# Patient Record
Sex: FEMALE | Race: WHITE | URBAN - METROPOLITAN AREA
[De-identification: names, ages, dates, MRNs, and addresses within clinical notes are randomized per-mention and may not be internally consistent; named-entity substitution may affect disease eponyms.]

---

## 2017-01-10 ENCOUNTER — CARE COORDINATION (OUTPATIENT)
Dept: ONCOLOGY | Facility: CLINIC | Age: 47
End: 2017-01-10

## 2017-01-10 DIAGNOSIS — C20 RECTAL CANCER (H): Primary | ICD-10-CM

## 2017-01-10 NOTE — PROGRESS NOTES
Called Fabienne to review conversation below.  She is going to call her insurance company to discuss and will return call to let us know what she would like to do.    Becky Interiano PA-C Thomas, Joyce; Becky Cordova, RN; Fly Dan                   Thanks Ayana. Becky, please call patient and see what she wants to do. We could potentially switch her to 5-FU with Avastin or she could pay until she reaches her out of pocket max and stay on the pills. 5-FU would be a take home pump and her infusions would be every 2 weeks. They should be equally efficacious, just the pump is less convenient and more frequent infusion times.   Becky            Previous Messages       ----- Message -----      From: Ayana Garcia      Sent: 1/9/2017  11:30 AM        To: Becky Interiano PA-C, Xochitl Christine MD, *   Subject: Capecitabine therapy                             Hello,     I wanted to give you all a update on the Capecitabine therapy for Fabienne. Her medication went thru insurance with no prior authorization but the patient copayment is $2,231.91. I looked into finding copay assistance for her but there are no grants available under her diagnosis. Unfortunately, once capecitabine as a generic became available, there are no free drug programs either. I have not contacted the patient, this is actually a Fly patient because she is on infusion therapy, but I wanted to make you all aware in case you want to offer another option.     Thank you,   Ayana

## 2017-01-11 NOTE — PROGRESS NOTES
Received update from Fabienne- she will have funds in an HSA to cover medication this month.  She is going to call [facility] to provide them with her card.  There is the potential delay in starting her pills by a few days.  She will call and let us know when she has them and has started taking them.

## 2017-01-13 RX ORDER — CAPECITABINE 500 MG/1
1000 TABLET, FILM COATED ORAL 2 TIMES DAILY
Qty: 84 TABLET | Refills: 0 | Status: SHIPPED | OUTPATIENT
Start: 2017-01-13 | End: 2017-03-15

## 2017-01-13 NOTE — PROGRESS NOTES
[facility] pharmacy liasion Fly Ni is researching one final possibility of a inge for Fabienne.  She is due to start Xeloda on 1/18/17, but discussed that a day or two delay is acceptable to wait for inge.  Change in pharmacy due to insurance- if same cost, she prefers to get [place] [facility] [place] [facility].

## 2017-01-20 ENCOUNTER — APPOINTMENT (OUTPATIENT)
Dept: LAB | Facility: CLINIC | Age: 47
End: 2017-01-20
Attending: INTERNAL MEDICINE
Payer: COMMERCIAL

## 2017-01-20 ENCOUNTER — INFUSION THERAPY VISIT (OUTPATIENT)
Dept: ONCOLOGY | Facility: CLINIC | Age: 47
End: 2017-01-20
Attending: INTERNAL MEDICINE
Payer: COMMERCIAL

## 2017-01-20 VITALS
SYSTOLIC BLOOD PRESSURE: 112 MMHG | OXYGEN SATURATION: 98 % | HEART RATE: 66 BPM | WEIGHT: 136.4 LBS | BODY MASS INDEX: 23.78 KG/M2 | TEMPERATURE: 95.6 F | DIASTOLIC BLOOD PRESSURE: 77 MMHG | RESPIRATION RATE: 18 BRPM

## 2017-01-20 DIAGNOSIS — C20 RECTAL CANCER (H): Primary | ICD-10-CM

## 2017-01-20 LAB
ALBUMIN SERPL-MCNC: 4 G/DL (ref 3.4–5)
ALBUMIN UR-MCNC: NEGATIVE MG/DL
ALP SERPL-CCNC: 92 U/L (ref 40–150)
ALT SERPL W P-5'-P-CCNC: 42 U/L (ref 0–50)
ANION GAP SERPL CALCULATED.3IONS-SCNC: 8 MMOL/L (ref 3–14)
AST SERPL W P-5'-P-CCNC: 37 U/L (ref 0–45)
BASOPHILS # BLD AUTO: 0 10E9/L (ref 0–0.2)
BASOPHILS NFR BLD AUTO: 0.5 %
BILIRUB SERPL-MCNC: 2.5 MG/DL (ref 0.2–1.3)
BUN SERPL-MCNC: 19 MG/DL (ref 7–30)
CALCIUM SERPL-MCNC: 9.1 MG/DL (ref 8.5–10.1)
CHLORIDE SERPL-SCNC: 108 MMOL/L (ref 94–109)
CO2 SERPL-SCNC: 26 MMOL/L (ref 20–32)
CREAT SERPL-MCNC: 0.56 MG/DL (ref 0.52–1.04)
DIFFERENTIAL METHOD BLD: ABNORMAL
EOSINOPHIL # BLD AUTO: 0.1 10E9/L (ref 0–0.7)
EOSINOPHIL NFR BLD AUTO: 1.9 %
ERYTHROCYTE [DISTWIDTH] IN BLOOD BY AUTOMATED COUNT: 15.1 % (ref 10–15)
GFR SERPL CREATININE-BSD FRML MDRD: ABNORMAL ML/MIN/1.7M2
GLUCOSE SERPL-MCNC: 90 MG/DL (ref 70–99)
HCT VFR BLD AUTO: 37.6 % (ref 35–47)
HGB BLD-MCNC: 13.4 G/DL (ref 11.7–15.7)
IMM GRANULOCYTES # BLD: 0 10E9/L (ref 0–0.4)
IMM GRANULOCYTES NFR BLD: 0.2 %
LYMPHOCYTES # BLD AUTO: 1.1 10E9/L (ref 0.8–5.3)
LYMPHOCYTES NFR BLD AUTO: 25.6 %
MCH RBC QN AUTO: 35.2 PG (ref 26.5–33)
MCHC RBC AUTO-ENTMCNC: 35.6 G/DL (ref 31.5–36.5)
MCV RBC AUTO: 99 FL (ref 78–100)
MONOCYTES # BLD AUTO: 0.3 10E9/L (ref 0–1.3)
MONOCYTES NFR BLD AUTO: 8 %
NEUTROPHILS # BLD AUTO: 2.7 10E9/L (ref 1.6–8.3)
NEUTROPHILS NFR BLD AUTO: 63.8 %
NRBC # BLD AUTO: 0 10*3/UL
NRBC BLD AUTO-RTO: 0 /100
PLATELET # BLD AUTO: 90 10E9/L (ref 150–450)
POTASSIUM SERPL-SCNC: 4.1 MMOL/L (ref 3.4–5.3)
PROT SERPL-MCNC: 7.3 G/DL (ref 6.8–8.8)
RBC # BLD AUTO: 3.81 10E12/L (ref 3.8–5.2)
SODIUM SERPL-SCNC: 142 MMOL/L (ref 133–144)
WBC # BLD AUTO: 4.3 10E9/L (ref 4–11)

## 2017-01-20 PROCEDURE — 80053 COMPREHEN METABOLIC PANEL: CPT | Performed by: PHYSICIAN ASSISTANT

## 2017-01-20 PROCEDURE — 85025 COMPLETE CBC W/AUTO DIFF WBC: CPT | Performed by: PHYSICIAN ASSISTANT

## 2017-01-20 PROCEDURE — 25000125 ZZHC RX 250: Mod: ZF | Performed by: PHYSICIAN ASSISTANT

## 2017-01-20 PROCEDURE — 96413 CHEMO IV INFUSION 1 HR: CPT

## 2017-01-20 PROCEDURE — 25000125 ZZHC RX 250: Mod: ZF | Performed by: INTERNAL MEDICINE

## 2017-01-20 PROCEDURE — 25000128 H RX IP 250 OP 636: Mod: JW,ZF | Performed by: PHYSICIAN ASSISTANT

## 2017-01-20 PROCEDURE — 81003 URINALYSIS AUTO W/O SCOPE: CPT | Performed by: INTERNAL MEDICINE

## 2017-01-20 RX ORDER — HEPARIN SODIUM (PORCINE) LOCK FLUSH IV SOLN 100 UNIT/ML 100 UNIT/ML
5 SOLUTION INTRAVENOUS EVERY 8 HOURS
Status: DISCONTINUED | OUTPATIENT
Start: 2017-01-20 | End: 2017-01-20 | Stop reason: HOSPADM

## 2017-01-20 RX ADMIN — BEVACIZUMAB 465 MG: 400 INJECTION, SOLUTION INTRAVENOUS at 08:53

## 2017-01-20 RX ADMIN — SODIUM CHLORIDE, PRESERVATIVE FREE 5 ML: 5 INJECTION INTRAVENOUS at 08:09

## 2017-01-20 RX ADMIN — SODIUM CHLORIDE, PRESERVATIVE FREE 5 ML: 5 INJECTION INTRAVENOUS at 09:22

## 2017-01-20 RX ADMIN — SODIUM CHLORIDE 250 ML: 9 INJECTION, SOLUTION INTRAVENOUS at 08:53

## 2017-01-20 ASSESSMENT — PAIN SCALES - GENERAL: PAINLEVEL: NO PAIN (0)

## 2017-01-20 NOTE — PROGRESS NOTES
A inge was found for Fabienne and the pharmacy liaison reports that she took home her Xeloda with $0 copay today.

## 2017-01-20 NOTE — MR AVS SNAPSHOT
After Visit Summary   1/20/2017    Fabienne Lynch    MRN: [id number]           Patient Information     Date Of Birth          1970        Visit Information        Provider Department      1/20/2017 8:00 AM  16 ATC;  ONCOLOGY INFUSION [facility]        Today's Diagnoses     Rectal cancer (H)    -  1       Care Instructions    Contact Numbers  [facility]: [phone]    After Hours:  [phone]  Triage: [phone]    Please call the [facility] Triage line if you experience a temperature greater than or equal to 100.5, shaking chills, have uncontrolled nausea, vomiting and/or diarrhea, dizziness, shortness of breath, chest pain, bleeding, unexplained bruising, or if you have any other new/concerning symptoms, questions or concerns.     If it is after hours, weekends, or holidays, please call the main hospital  at  [phone] and ask to speak to the Oncology doctor on call.     If you are having any concerning symptoms or wish to speak to a provider before your next infusion visit, please call your care coordinator or triage to notify them so we can adequately serve you.     If you need a refill on a narcotic prescription or other medication, please call triage before your infusion appointment.         January 2017 Sunday Monday Tuesday Wednesday Thursday Friday Saturday   1     2     3     4     5     6     7       8     9     10     11     12     13     14       15     16     17     18     19     20     [facility] LAB DRAW    7:30 AM   (15 min.)    [facility] LAB DRAW   [facility] Lab Draw     [facility] ONC INFUSION 60    8:00 AM   (60 min.)    ONCOLOGY INFUSION   [facility] 21       22     23     24     25     26     27     28       29     30     31                                    February 2017 Sunday Monday Tuesday Wednesday Thursday Friday Saturday                  1     2     3     4       5     6     7     8     9      10     [facility] LAB DRAW    7:15 AM   (15 min.)    [facility] LAB DRAW   [facility] Lab Draw     CT CHEST/ABDOMEN/PELVIS W    7:40 AM   (20 min.)   UCCT2   [facility] CT 11       12     13     [facility] LAB DRAW    7:15 AM   (15 min.)   [facility]   [facility] Lab Draw     [facility] RETURN    7:45 AM   (30 min.)   Xochitl Christine MD   [facility] ONC INFUSION 60    8:30 AM   (60 min.)    ONCOLOGY INFUSION   [facility] 14     15     16     17     18       19     20     21     22     23     24     25       26     27     28                                     Recent Results (from the past 24 hour(s))   Comprehensive metabolic panel    Collection Time: 01/20/17  8:15 AM   Result Value Ref Range    Sodium 142 133 - 144 mmol/L    Potassium 4.1 3.4 - 5.3 mmol/L    Chloride 108 94 - 109 mmol/L    Carbon Dioxide 26 20 - 32 mmol/L    Anion Gap 8 3 - 14 mmol/L    Glucose 90 70 - 99 mg/dL    Urea Nitrogen 19 7 - 30 mg/dL    Creatinine 0.56 0.52 - 1.04 mg/dL    GFR Estimate >90  Non  GFR Calc   >60 mL/min/1.7m2    GFR Estimate If Black >90   GFR Calc   >60 mL/min/1.7m2    Calcium 9.1 8.5 - 10.1 mg/dL    Bilirubin Total 2.5 (H) 0.2 - 1.3 mg/dL    Albumin 4.0 3.4 - 5.0 g/dL    Protein Total 7.3 6.8 - 8.8 g/dL    Alkaline Phosphatase 92 40 - 150 U/L    ALT 42 0 - 50 U/L    AST 37 0 - 45 U/L   CBC with platelets differential    Collection Time: 01/20/17  8:15 AM   Result Value Ref Range    WBC 4.3 4.0 - 11.0 10e9/L    RBC Count 3.81 3.8 - 5.2 10e12/L    Hemoglobin 13.4 11.7 - 15.7 g/dL    Hematocrit 37.6 35.0 - 47.0 %    MCV 99 78 - 100 fl    MCH 35.2 (H) 26.5 - 33.0 pg    MCHC 35.6 31.5 - 36.5 g/dL    RDW 15.1 (H) 10.0 - 15.0 %    Platelet Count 90 (L) 150 - 450 10e9/L    Diff Method Automated Method     % Neutrophils 63.8 %    % Lymphocytes 25.6 %    % Monocytes 8.0 %    % Eosinophils 1.9 %    % Basophils 0.5 %    %  Immature Granulocytes 0.2 %    Nucleated RBCs 0 0 /100    Absolute Neutrophil 2.7 1.6 - 8.3 10e9/L    Absolute Lymphocytes 1.1 0.8 - 5.3 10e9/L    Absolute Monocytes 0.3 0.0 - 1.3 10e9/L    Absolute Eosinophils 0.1 0.0 - 0.7 10e9/L    Absolute Basophils 0.0 0.0 - 0.2 10e9/L    Abs Immature Granulocytes 0.0 0 - 0.4 10e9/L    Absolute Nucleated RBC 0.0    Protein qualitative urine    Collection Time: 01/20/17  8:22 AM   Result Value Ref Range    Protein Albumin Urine Negative NEG mg/dL                 Follow-ups after your visit        Your next 10 appointments already scheduled     Feb 10, 2017  7:15 AM   [facility] Lab Draw with  M[facility]ONIC LAB DRAW   [facility] Lab Draw ([facility])    [address] [place] [id number] [phone]            Feb 10, 2017  7:40 AM   (Arrive by 7:25 AM)   CT CHEST/ABDOMEN/PELVIS W CONTRAST with UCCT2   [facility] [place] [facility] ([facility])    [address] [place] [id number] [phone]           Please bring any scans or X-rays taken at other hospitals, if similar tests were done. Also bring a list of your medicines, including vitamins, minerals and over-the-counter drugs. It is safest to leave personal items at home.  Be sure to tell your doctor:   If you have any allergies.   If there s any chance you are pregnant.   If you are breastfeeding.   If you have any special needs.  You may have contrast for this exam. To prepare:   Do not eat or drink for 2 hours before your exam. If you need to take medicine, you may take it with small sips of water. (We may ask you to take liquid medicine as well.)   The day before your exam, drink extra fluids at least six 8-ounce glasses (unless your doctor tells you to restrict your fluids).  Patients over 70 or patients with diabetes or kidney problems:   If you haven t had a blood test (creatinine test) within the last 30 days, go to  your clinic or Diagnostic Imaging Department for this test.  If you have diabetes:   If your kidney function is normal, continue taking your metformin (Avandamet, Glucophage, Glucovance, Metaglip) on the day of your exam.   If your kidney function is abnormal, wait 48 hours before restarting this medicine.  You will have oral contrast for this exam:   You will drink the contrast at home. Get this from your clinic or Diagnostic Imaging Department. Please follow the directions given.  Please wear loose clothing, such as a sweat suit or jogging clothes. Avoid snaps, zippers and other metal. We may ask you to undress and put on a hospital gown.  If you have any questions, please call the Imaging Department where you will have your exam.            Feb 13, 2017  7:15 AM   [facility] Lab Draw with  M[facility] LAB DRAW   [facility] Lab Draw ([facility])    [address] [place] [id number] [phone]            Feb 13, 2017  7:45 AM   (Arrive by 7:30 AM)   Return Visit with Xochitl Christine MD   [facility] ([facility])    [address] [place] [id number] [phone]            Feb 13, 2017  8:30 AM   Infusion 60 with [facility] ONCOLOGY INFUSION, UC 16 ATC   [facility] ([facility])    [address] [place] [id number] [phone]              Who to contact     If you have questions or need follow up information about today's clinic visit or your schedule please contact [facility] directly at [phone].  Normal or non-critical lab and imaging results will be communicated to you by MyChart, letter or phone within 4 business days after the clinic has received the results. If you do not hear from us within 7 days, please contact the clinic through MyChart or phone. If you have a critical or abnormal lab result, we  will notify you by phone as soon as possible.  Submit refill requests through [employer] or call your pharmacy and they will forward the refill request to us. Please allow 3 business days for your refill to be completed.          Additional Information About Your Visit        [employer] Information     [employer] gives you secure access to your electronic health record. If you see a primary care provider, you can also send messages to your care team and make appointments. If you have questions, please call your primary care clinic.  If you do not have a primary care provider, please call [phone] and they will assist you.        Care EveryWhere ID     This is your Care EveryWhere ID. This could be used by other organizations to access your [place] medical records  [id number]        Your Vitals Were     Pulse Temperature Respirations Pulse Oximetry          66 95.6  F (35.3  C) 18 98%         Blood Pressure from Last 3 Encounters:   01/20/17 112/77   12/30/16 117/78   12/09/16 104/76    Weight from Last 3 Encounters:   01/20/17 61.871 kg (136 lb 6.4 oz)   12/30/16 62.097 kg (136 lb 14.4 oz)   12/09/16 62.279 kg (137 lb 4.8 oz)              We Performed the Following     CBC with platelets differential     Comprehensive metabolic panel     MD Instruction for Protocol     Nursing Communication 1     Protein qualitative urine        Primary Care Provider Office Phone # Fax #    Golden Quinteros [phone]       [place] [address] [facility] [address]        Thank you!     Thank you for choosing [facility]  for your care. Our goal is always to provide you with excellent care. Hearing back from our patients is one way we can continue to improve our services. Please take a few minutes to complete the written survey that you may receive in the mail after your visit with us. Thank you!             Your Updated Medication List - Protect others around you: Learn how to safely use,  store and throw away your medicines at www.disposemymeds.org.          This list is accurate as of: 1/20/17  9:02 AM.  Always use your most recent med list.                   Brand Name Dispense Instructions for use    * capecitabine 500 MG tablet CHEMO    XELODA     Take 3 tablets (1,500 mg) by mouth 2 times daily Take by mouth twice daily for 7 days on, then 7 days off.       * capecitabine 500 MG tablet CHEMO    XELODA    84 tablet    Take 3 tablets (1,500 mg) by mouth 2 times daily for 7 days on, then 7 days off       ondansetron 8 MG tablet    ZOFRAN    30 tablet    Take 1 tablet (8 mg) by mouth every 8 hours as needed for nausea       TYLENOL PO      Take 500 mg by mouth every 6 hours as needed       * Notice:  This list has 2 medication(s) that are the same as other medications prescribed for you. Read the directions carefully, and ask your doctor or other care provider to review them with you.

## 2017-01-20 NOTE — PATIENT INSTRUCTIONS
Contact Numbers  [facility]: [phone]    After Hours:  [phone]  Triage: [phone]    Please call the [facility] Triage line if you experience a temperature greater than or equal to 100.5, shaking chills, have uncontrolled nausea, vomiting and/or diarrhea, dizziness, shortness of breath, chest pain, bleeding, unexplained bruising, or if you have any other new/concerning symptoms, questions or concerns.     If it is after hours, weekends, or holidays, please call the main hospital  at  [phone] and ask to speak to the Oncology doctor on call.     If you are having any concerning symptoms or wish to speak to a provider before your next infusion visit, please call your care coordinator or triage to notify them so we can adequately serve you.     If you need a refill on a narcotic prescription or other medication, please call triage before your infusion appointment.         January 2017 Sunday Monday Tuesday Wednesday Thursday Friday Saturday   1     2     3     4     5     6     7       8     9     10     11     12     13     14       15     16     17     18     19     20     [facility] D[facility]    7:30 AM   (15 min.)    [facility] LAB DRAW   [facility] Lab Draw     [facility] ONC INFUSION 60    8:00 AM   (60 min.)    ONCOLOGY INFUSION   [facility] 21       22     23     24     25     26     27     28       29     30     31 February 2017 Sunday Monday Tuesday Wednesday Thursday Friday Saturday                  1     2     3     4       5     6     7     8     9     10     [facility] D[facility]    7:15 AM   (15 min.)    [facility] DRAW   [facility]     CT CHEST/ABDOMEN/PELVIS W    7:40 AM   (20 min.)   UCCT2   [facility] CT 11       12     13     [facility] DRAW    7:15 AM   (15 min.)    [facility] LAB DRAW   [facility]     [facility] RETURN    7:45 AM   (30 min.)   Xochitl Christine MD   M  [facility]     [facility] ONC INFUSION 60    8:30 AM   (60 min.)    ONCOLOGY INFUSION   [facility] 14     15     16     17     18       19     20     21     22     23     24     25       26     27     28                                     Recent Results (from the past 24 hour(s))   Comprehensive metabolic panel    Collection Time: 01/20/17  8:15 AM   Result Value Ref Range    Sodium 142 133 - 144 mmol/L    Potassium 4.1 3.4 - 5.3 mmol/L    Chloride 108 94 - 109 mmol/L    Carbon Dioxide 26 20 - 32 mmol/L    Anion Gap 8 3 - 14 mmol/L    Glucose 90 70 - 99 mg/dL    Urea Nitrogen 19 7 - 30 mg/dL    Creatinine 0.56 0.52 - 1.04 mg/dL    GFR Estimate >90  Non  GFR Calc   >60 mL/min/1.7m2    GFR Estimate If Black >90   GFR Calc   >60 mL/min/1.7m2    Calcium 9.1 8.5 - 10.1 mg/dL    Bilirubin Total 2.5 (H) 0.2 - 1.3 mg/dL    Albumin 4.0 3.4 - 5.0 g/dL    Protein Total 7.3 6.8 - 8.8 g/dL    Alkaline Phosphatase 92 40 - 150 U/L    ALT 42 0 - 50 U/L    AST 37 0 - 45 U/L   CBC with platelets differential    Collection Time: 01/20/17  8:15 AM   Result Value Ref Range    WBC 4.3 4.0 - 11.0 10e9/L    RBC Count 3.81 3.8 - 5.2 10e12/L    Hemoglobin 13.4 11.7 - 15.7 g/dL    Hematocrit 37.6 35.0 - 47.0 %    MCV 99 78 - 100 fl    MCH 35.2 (H) 26.5 - 33.0 pg    MCHC 35.6 31.5 - 36.5 g/dL    RDW 15.1 (H) 10.0 - 15.0 %    Platelet Count 90 (L) 150 - 450 10e9/L    Diff Method Automated Method     % Neutrophils 63.8 %    % Lymphocytes 25.6 %    % Monocytes 8.0 %    % Eosinophils 1.9 %    % Basophils 0.5 %    % Immature Granulocytes 0.2 %    Nucleated RBCs 0 0 /100    Absolute Neutrophil 2.7 1.6 - 8.3 10e9/L    Absolute Lymphocytes 1.1 0.8 - 5.3 10e9/L    Absolute Monocytes 0.3 0.0 - 1.3 10e9/L    Absolute Eosinophils 0.1 0.0 - 0.7 10e9/L    Absolute Basophils 0.0 0.0 - 0.2 10e9/L    Abs Immature Granulocytes 0.0 0 - 0.4 10e9/L    Absolute Nucleated RBC 0.0    Protein  qualitative urine    Collection Time: 01/20/17  8:22 AM   Result Value Ref Range    Protein Albumin Urine Negative NEG mg/dL

## 2017-01-20 NOTE — NURSING NOTE
Chief Complaint   Patient presents with     Port Draw     port accessed with power needel for labs and infusion, vitals checked

## 2017-01-20 NOTE — PROGRESS NOTES
Infusion Nursing Note:  Fabienne Lynch presents today for C7D1 Avastin.    Patient seen by provider today: No    Treatment Conditions:  HGB     13.4   1/20/2017  WBC      4.3   1/20/2017   ANEU      2.7   1/20/2017  PLT       90   1/20/2017       NA      142   1/20/2017                POTASSIUM      4.1   1/20/2017           CR     0.56   1/20/2017                ANDRZEJ      9.1   1/20/2017             BILITOTAL      2.5   1/20/2017        ALBUMIN      4.0   1/20/2017                 ALT       42   1/20/2017        AST       37   1/20/2017  BP:112/77  Urine: negative  Results reviewed, labs MET treatment parameters, ok to proceed with treatment.    Intravenous Access:  Implanted Port.  Access dc'd at time of discharge.      Note:   Results reviewed, copy given to patient.  Proceed with treatment.    Copy of AVS given to patient. + Blood return from PORT pre and post infusion.  Tolerated infusion without incident. Xeloda Prescription filled today.   D/C in care of spouse.  Pt will return 2/10 for CT and 2/13 for provider/lab appt.       Ghada Jacobson RN    Administrations This Visit     0.9% sodium chloride BOLUS     Admin Date Action Dose Route Administered By             01/20/2017 New Bag 250 mL Intravenous Ghada Jacobson RN                    bevacizumab (AVASTIN) 465 mg in NaCl 0.9 % 128 mL CHEMOTHERAPY     Admin Date Action Dose Route Administered By             01/20/2017 New Bag 465 mg Intravenous Ghada Jacobson RN                    heparin 100 UNIT/ML injection 5 mL     Admin Date Action Dose Route Administered By             01/20/2017 Given 5 mL Intracatheter Sagrario Orellana RN

## 2017-02-10 ENCOUNTER — APPOINTMENT (OUTPATIENT)
Dept: LAB | Facility: CLINIC | Age: 47
End: 2017-02-10
Attending: INTERNAL MEDICINE
Payer: COMMERCIAL

## 2017-02-10 ENCOUNTER — OFFICE VISIT (OUTPATIENT)
Dept: ONCOLOGY | Facility: CLINIC | Age: 47
End: 2017-02-10
Attending: GENETIC COUNSELOR, MS
Payer: COMMERCIAL

## 2017-02-10 VITALS
WEIGHT: 137.1 LBS | DIASTOLIC BLOOD PRESSURE: 79 MMHG | OXYGEN SATURATION: 95 % | TEMPERATURE: 97.9 F | BODY MASS INDEX: 23.9 KG/M2 | RESPIRATION RATE: 16 BRPM | SYSTOLIC BLOOD PRESSURE: 105 MMHG | HEART RATE: 69 BPM

## 2017-02-10 DIAGNOSIS — C20 MALIGNANT NEOPLASM OF RECTUM (H): ICD-10-CM

## 2017-02-10 DIAGNOSIS — C20 RECTAL CANCER (H): ICD-10-CM

## 2017-02-10 LAB
ALBUMIN SERPL-MCNC: 4 G/DL (ref 3.4–5)
ALP SERPL-CCNC: 91 U/L (ref 40–150)
ALT SERPL W P-5'-P-CCNC: 45 U/L (ref 0–50)
ANION GAP SERPL CALCULATED.3IONS-SCNC: 8 MMOL/L (ref 3–14)
AST SERPL W P-5'-P-CCNC: 40 U/L (ref 0–45)
BASOPHILS # BLD AUTO: 0 10E9/L (ref 0–0.2)
BASOPHILS NFR BLD AUTO: 0.2 %
BILIRUB SERPL-MCNC: 2.6 MG/DL (ref 0.2–1.3)
BUN SERPL-MCNC: 16 MG/DL (ref 7–30)
CALCIUM SERPL-MCNC: 9.3 MG/DL (ref 8.5–10.1)
CEA SERPL-MCNC: 1.3 UG/L (ref 0–2.5)
CHLORIDE SERPL-SCNC: 108 MMOL/L (ref 94–109)
CO2 SERPL-SCNC: 26 MMOL/L (ref 20–32)
CREAT SERPL-MCNC: 0.56 MG/DL (ref 0.52–1.04)
DIFFERENTIAL METHOD BLD: ABNORMAL
EOSINOPHIL # BLD AUTO: 0.1 10E9/L (ref 0–0.7)
EOSINOPHIL NFR BLD AUTO: 1.3 %
ERYTHROCYTE [DISTWIDTH] IN BLOOD BY AUTOMATED COUNT: 14.8 % (ref 10–15)
GFR SERPL CREATININE-BSD FRML MDRD: ABNORMAL ML/MIN/1.7M2
GLUCOSE SERPL-MCNC: 92 MG/DL (ref 70–99)
HCT VFR BLD AUTO: 37.8 % (ref 35–47)
HGB BLD-MCNC: 13.4 G/DL (ref 11.7–15.7)
IMM GRANULOCYTES # BLD: 0 10E9/L (ref 0–0.4)
IMM GRANULOCYTES NFR BLD: 0.2 %
LYMPHOCYTES # BLD AUTO: 1.2 10E9/L (ref 0.8–5.3)
LYMPHOCYTES NFR BLD AUTO: 25.5 %
MCH RBC QN AUTO: 35.1 PG (ref 26.5–33)
MCHC RBC AUTO-ENTMCNC: 35.4 G/DL (ref 31.5–36.5)
MCV RBC AUTO: 99 FL (ref 78–100)
MONOCYTES # BLD AUTO: 0.3 10E9/L (ref 0–1.3)
MONOCYTES NFR BLD AUTO: 6.8 %
NEUTROPHILS # BLD AUTO: 3 10E9/L (ref 1.6–8.3)
NEUTROPHILS NFR BLD AUTO: 66 %
NRBC # BLD AUTO: 0 10*3/UL
NRBC BLD AUTO-RTO: 0 /100
PLATELET # BLD AUTO: 99 10E9/L (ref 150–450)
POTASSIUM SERPL-SCNC: 3.9 MMOL/L (ref 3.4–5.3)
PROT SERPL-MCNC: 7.4 G/DL (ref 6.8–8.8)
RBC # BLD AUTO: 3.82 10E12/L (ref 3.8–5.2)
SODIUM SERPL-SCNC: 143 MMOL/L (ref 133–144)
WBC # BLD AUTO: 4.6 10E9/L (ref 4–11)

## 2017-02-10 PROCEDURE — 40000072 ZZH STATISTIC GENETIC COUNSELING, < 16 MIN: Mod: ZF | Performed by: GENETIC COUNSELOR, MS

## 2017-02-10 PROCEDURE — 25000128 H RX IP 250 OP 636: Mod: ZF | Performed by: GENETIC COUNSELOR, MS

## 2017-02-10 PROCEDURE — 82378 CARCINOEMBRYONIC ANTIGEN: CPT | Performed by: PHYSICIAN ASSISTANT

## 2017-02-10 PROCEDURE — 80053 COMPREHEN METABOLIC PANEL: CPT | Performed by: PHYSICIAN ASSISTANT

## 2017-02-10 PROCEDURE — 36591 DRAW BLOOD OFF VENOUS DEVICE: CPT

## 2017-02-10 PROCEDURE — 85025 COMPLETE CBC W/AUTO DIFF WBC: CPT | Performed by: PHYSICIAN ASSISTANT

## 2017-02-10 RX ORDER — HEPARIN SODIUM (PORCINE) LOCK FLUSH IV SOLN 100 UNIT/ML 100 UNIT/ML
5 SOLUTION INTRAVENOUS ONCE
Status: COMPLETED | OUTPATIENT
Start: 2017-02-10 | End: 2017-02-10

## 2017-02-10 RX ADMIN — SODIUM CHLORIDE, PRESERVATIVE FREE 5 ML: 5 INJECTION INTRAVENOUS at 07:53

## 2017-02-10 ASSESSMENT — PAIN SCALES - GENERAL: PAINLEVEL: NO PAIN (0)

## 2017-02-10 NOTE — LETTER
2/10/2017       RE: Fabienne Lynch  [address] [address]     Dear Colleague,    Thank you for referring your patient, Fabienne Lynch, to the [facility]. Please see a copy of my visit note below.    2/10/2017    Referring Provider: Christopher Christine MD     Presenting Information:   Fabienne returned to the Cancer Risk Management Program at the [facility] to discuss her genetic testing results. Her blood was drawn on 11/18/216 and we ordered [facility]Next-Cancer testing from [facility]. This testing was done because of her family history of breast cancer and ovarian cancer, and a reported RAD51C mutation in her father's cousin, although a test report was not available at the time.     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 We discussed this mutation is associated with an increased risk for ovarian cancer and breast cancer.  Most likely, this is the mutation that was found in Fabienne's relative, although we do not have a test report for confirmation.  We discussed the impact of this testing on Fabienne in detail.     Of note, Fabienne tested negative for mutations in the following genes by sequencing and deletion/duplication analysis: MARYANN, BRCA1, BRCA2, BRIP1, CHEK2, CDH1, EPCAM (deletion/duplication only), MLH1, MSH2, MSH6, PALB2, PMS2, PTEN, RAD51C, RAD51D, and TP53.  We reviewed the autosomal dominant inheritance of these genes.  Fabienne cannot pass on a mutation in any of these genes to her children based on this test result.  Mutations in these genes do not skip generations.      Cancer Risks and Screening/Prevention:   Harmful mutations in RAD51C can cause increased risk for ovarian cancer and breast cancer.  Studies have shown that the risk for ovarian cancer is around 5-9% (compared to 1-2% in the general population) for individuals with a pathogenic RAD51C mutation.  We discussed that the  National Comprehensive Cancer Network (NCCN) recommends consideration of prophylactic salpingo-oophorectomy (surgical removal of the ovaries and fallopian tubes) for women with a pathogenic variant in RAD51C.      The exact risks for breast cancer for RAD51C mutation carriers are not well defined at this time. There are currently no medical management guidelines for breast cancer risk for individuals with RAD51C mutations.      Cancer Risks:   Harmful mutations in RAD51C can cause increased risk for ovarian cancer and breast cancer.  Studies have shown that the risk for ovarian cancer is around 5-9% (compared to 1-2% in the general population) for individuals with a pathogenic RAD51C mutation.  We discussed that the National Comprehensive Cancer Network (NCCN) recommends consideration of prophylactic salpingo-oophorectomy (surgical removal of the ovaries and fallopian tubes) for women with a pathogenic variant in RAD51C.      Cancer Screening Recommendations:  The following screening is recommended for women who have a mutation in the RAD51C gene.    Consideration of prophylactic salpingo-oophorectomy at ages 45-50.  Fabienne has already had her uterus, ovaries, and fallopian tubes removed, thus greatly reducing the chance for a uterine or ovarian cancer.    NCCN states that there is not enough evidence to make a firm recommendation for the age of the procedure, but has suggested that a conversation regarding surgery should be held at 45-50 years old.    It is also important for Fabienne and her relatives to refer back to the family history for additional appropriate cancer screening:    Fabienne s mother's close female relatives remain at increased risk for breast cancer given their family history.  These women would be advised to undergo breast cancer screening beginning approximately 10 years younger than the earliest age of breast cancer diagnosis in the family, or at age 40, whichever comes first. Appropriate breast  screening options should be discussed with an individual's care providers, to determine what screening is appropriate, or if additional screening (such as breast MRI) is necessary based on personal/family history factors.      We discussed that [place] breast cancer risk is 18.3% by the EFRAIN model, and should therefore follow population breast cancer screening.    As discussed previously, Fabienne's first-degree relatives are at an increased risk for colorectal cancer based on her history, and should be screened based on the family history.    Final screening recommendations should be made by each individual's managing physician. Other population cancer screening, such as recommendations by the American Cancer Society, are appropriate for Fabienne and her family.  These screening recommendations may change if there are changes to Fabienne's personal and/or family history.      Implications for Family Members:  We reviewed that mutations in RAD51C gene are inherited in an autosomal dominant pattern. This means that each of Fabienne's children have a 50% chance of inheriting the same mutation.  Likewise, each of her siblings also has a 50% risk of having the same mutation. I am happy to help her relatives connect with a genetic counselor in their area if they would like to discuss testing.  It would also be important for siblings to ask a genetic counselor testing for other breast cancer risk genes, as we are unable to determine which side of the family this mutation came from, and it is possible that other family members have a mutation in a different breast cancer risk gene.     In rare situations in which both parents have a mutation in the RAD51C gene, their children each have a 25% risk for Fanconi anemia. Fanconi anemia is a rare condition with onset in childhood.  Fanconi anemia often results in physical abnormalities, growth retardation, bone marrow failure, and increased risk for cancer.  In individuals of childbearing  "age with RAD51C mutations, genetic counseling and genetic testing may be advised for their partners. We discussed that this testing is also indicated for Fabienne's . He is interested in being tested, and will set up an appointment to see me when they come back for Fabienne's treatment.    Plan:  1.  I provided Fabienne with a copy of her test results and support resources today.  2.  I will provide a \"Dear Relative\" letter for Fabienne to share with her family members.  3.  She plans to follow up with her physicians.    If  Fabienne has additional questions, I encouraged her to contact  me directly at [phone].   Time spent face to face: 10 minutes.  Becky Amaya MS, [facility] [place]  Certified Genetic Counselor  P. [phone]  F. [phone]      "

## 2017-02-10 NOTE — PROGRESS NOTES
2/10/2017    Referring Provider: Christopher Christine MD     Presenting Information:   Fabienne returned to the Cancer Risk Management Program at the [facility] to discuss her genetic testing results. Her blood was drawn on 11/18/216 and we ordered [facility]Next-Cancer testing from [facility]. This testing was done because of her family history of breast cancer and ovarian cancer, and a reported RAD51C mutation in her father's cousin, although a test report was not available at the time.     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 We discussed this mutation is associated with an increased risk for ovarian cancer and breast cancer.  Most likely, this is the mutation that was found in Fabienne's relative, although we do not have a test report for confirmation.  We discussed the impact of this testing on Fabienne in detail.     Of note, Fabienne tested negative for mutations in the following genes by sequencing and deletion/duplication analysis: MARYANN, BRCA1, BRCA2, BRIP1, CHEK2, CDH1, EPCAM (deletion/duplication only), MLH1, MSH2, MSH6, PALB2, PMS2, PTEN, RAD51C, RAD51D, and TP53.  We reviewed the autosomal dominant inheritance of these genes.  Fabienne cannot pass on a mutation in any of these genes to her children based on this test result.  Mutations in these genes do not skip generations.      Cancer Risks and Screening/Prevention:   Harmful mutations in RAD51C can cause increased risk for ovarian cancer and breast cancer.  Studies have shown that the risk for ovarian cancer is around 5-9% (compared to 1-2% in the general population) for individuals with a pathogenic RAD51C mutation.  We discussed that the National Comprehensive Cancer Network (NCCN) recommends consideration of prophylactic salpingo-oophorectomy (surgical removal of the ovaries and fallopian tubes) for women with a pathogenic variant in RAD51C.      The exact risks for breast  cancer for RAD51C mutation carriers are not well defined at this time. There are currently no medical management guidelines for breast cancer risk for individuals with RAD51C mutations.      Cancer Risks:   Harmful mutations in RAD51C can cause increased risk for ovarian cancer and breast cancer.  Studies have shown that the risk for ovarian cancer is around 5-9% (compared to 1-2% in the general population) for individuals with a pathogenic RAD51C mutation.  We discussed that the National Comprehensive Cancer Network (NCCN) recommends consideration of prophylactic salpingo-oophorectomy (surgical removal of the ovaries and fallopian tubes) for women with a pathogenic variant in RAD51C.      Cancer Screening Recommendations:  The following screening is recommended for women who have a mutation in the RAD51C gene.    Consideration of prophylactic salpingo-oophorectomy at ages 45-50.  Fabienne has already had her uterus, ovaries, and fallopian tubes removed, thus greatly reducing the chance for a uterine or ovarian cancer.    NCCN states that there is not enough evidence to make a firm recommendation for the age of the procedure, but has suggested that a conversation regarding surgery should be held at 45-50 years old.    It is also important for Fabienne and her relatives to refer back to the family history for additional appropriate cancer screening:    Fabienne s mother's close female relatives remain at increased risk for breast cancer given their family history.  These women would be advised to undergo breast cancer screening beginning approximately 10 years younger than the earliest age of breast cancer diagnosis in the family, or at age 40, whichever comes first. Appropriate breast screening options should be discussed with an individual's care providers, to determine what screening is appropriate, or if additional screening (such as breast MRI) is necessary based on personal/family history factors.      We discussed that  Fabienne's breast cancer risk is 18.3% by the EFRAIN model, and should therefore follow population breast cancer screening.    As discussed previously, Fabienne's first-degree relatives are at an increased risk for colorectal cancer based on her history, and should be screened based on the family history.    Final screening recommendations should be made by each individual's managing physician. Other population cancer screening, such as recommendations by the American Cancer Society, are appropriate for Fabienne and her family.  These screening recommendations may change if there are changes to Fabienne's personal and/or family history.      Implications for Family Members:  We reviewed that mutations in RAD51C gene are inherited in an autosomal dominant pattern. This means that each of Fabienne's children have a 50% chance of inheriting the same mutation.  Likewise, each of her siblings also has a 50% risk of having the same mutation. I am happy to help her relatives connect with a genetic counselor in their area if they would like to discuss testing.  It would also be important for siblings to ask a genetic counselor testing for other breast cancer risk genes, as we are unable to determine which side of the family this mutation came from, and it is possible that other family members have a mutation in a different breast cancer risk gene.     In rare situations in which both parents have a mutation in the RAD51C gene, their children each have a 25% risk for Fanconi anemia. Fanconi anemia is a rare condition with onset in childhood.  Fanconi anemia often results in physical abnormalities, growth retardation, bone marrow failure, and increased risk for cancer.  In individuals of childbearing age with RAD51C mutations, genetic counseling and genetic testing may be advised for their partners. We discussed that this testing is also indicated for Fabienne's . He is interested in being tested, and will set up an appointment to see me  "when they come back for Fabienne's treatment.    Plan:  1.  I provided Fabienne with a copy of her test results and support resources today.  2.  I will provide a \"Dear Relative\" letter for Fabienne to share with her family members.  3.  She plans to follow up with her physicians.    If  Fabienne has additional questions, I encouraged her to contact  me directly at [phone].   Time spent face to face: 10 minutes.  Becky Amaya MS, [facility] [place]  Certified Genetic Counselor  P. [phone]  F. [phone]    "

## 2017-02-10 NOTE — LETTER
Cancer Risk Management  Program [facility]  [facility]  Mailing Address  Cancer Risk Management Program  [facility]  [address]  [place] [address]    New patient appointments  [phone]  Dear Relative:  The purpose of this letter is to inform you that your relative recently underwent genetic counseling and genetic testing due to the family history of breast cancer, and more distant family history of ovarian cancer.  The testing done through [facility] identified a mutation in the RAD51C gene.  Specifically, the mutation is called 5'UTR_EX5del.    A mutation (or change in the genetic code) causes a specific gene to stop working properly.  Ultimately, individuals who have a mutation in this RAD51C gene have an increased risk for ovarian cancer and breast cancer. RAD51C mutations increase the lifetime risk for ovarian cancer up to 9%.  Mutations in this gene have also been associated with an increased risk for breast cancer; however the exact risks are not well defined at this time.    If individuals are found to have a mutation in the RAD51C gene, we would discuss risk reduction surgery that can prevent the development of ovarian cancer in women.  It is important to note that both men and women have a 50% chance of passing this mutation on to each of their children.  In rare situations in which both parents have a mutation in the RAD51C gene, their children each have a 25% risk for Fanconi anemia. Fanconi anemia is a rare condition with onset in childhood.  Fanconi anemia often results in physical abnormalities, growth retardation, bone marrow failure, and increased risk for cancer.  For individuals of childbearing age with RAD51C mutations, genetic counseling and genetic testing may be advised  for their partners.  I understand that this may be surprising, unexpected, and even scary news.  Because this mutation has been identified in your family, you are at risk for having the same mutation.  As mentioned earlier, your children may also be at risk.  Thus, I encourage you to contact me with questions or to schedule a genetic counseling appointment.  If you do not live in the [facility] area, I would be happy to provide you with contact information for genetic counselors in your city or state.  Genetic counselors can be found by visiting [email or web address].   Scheduling a genetic counseling appointment does not mean you have to undergo genetic testing.  The decision to pursue such testing is a very personal one that is discussed in more detail during the session.  Much of cancer genetic counseling is providing valuable information to individuals who are impacted by genetic information such as this.    RAD51C mutations are often not the entire explanation for an individual's cancer history. Because it is unclear what relatives in your family have this variant, and if individuals with cancer in your family have this variant, additional genetic testing may be indicated for you. There are many other genes other than RAD51C that are related to breast caner risk. You can discuss additional testing options with a genetic counselor.   Please feel free to contact me with any questions or concerns.  I can be reached at [phone].  Sincerely,       Becky Amaya MS, [facility] [place]  Certified Genetic Counselor  P. [phone]  F. [phone]

## 2017-02-10 NOTE — LETTER
Cancer Risk Management  Program Locations    [facility]  [facility]  Mailing Address  Cancer Risk Management Program  [address] [address]    New patient appointments  [phone]  February 20, 2017    Fabienne Lynch  [address] [place] [address]      Dear Fabienne,    It was nice seeing you and your  again at the [facility] on 2/10/17.  Here is a copy of the progress note from your recent genetic counseling visit to the Cancer Risk Management Program.  If you have any additional questions, please feel free to call.    Additionally, we talked about your brothers getting tested for the RAD51C mutation that was identified through your testing. I also think it would be a good idea for them to talk to a genetic counselor about testing for other breast cancer risk genes due to your mother's history.  I have included this in the family letter (enclosed).    Referring Provider: Christopher Christine MD     Presenting Information:   Fabienne returned to the Cancer Risk Management Program at the [facility] to discuss her genetic testing results. Her blood was drawn on 11/18/216 and we ordered [facility]Cancer testing from [facility]. This testing was done because of her family history of breast cancer and ovarian cancer, and a reported RAD51C mutation in her father's cousin, although a test report was not available at the time.     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 We discussed this mutation is associated with an increased risk for ovarian cancer and breast cancer.  Most likely, this is the mutation that was found in Fabienne's relative, although we do not have a test report for  confirmation.  We discussed the impact of this testing on Fabienne in detail.     Of note, Fabienne tested negative for mutations in the following genes by sequencing and deletion/duplication analysis: MARYANN, BRCA1, BRCA2, BRIP1, CHEK2, CDH1, EPCAM (deletion/duplication only), MLH1, MSH2, MSH6, PALB2, PMS2, PTEN, RAD51C, RAD51D, and TP53.  We reviewed the autosomal dominant inheritance of these genes.  Fabienne cannot pass on a mutation in any of these genes to her children based on this test result.  Mutations in these genes do not skip generations.      Cancer Risks and Screening/Prevention:   Harmful mutations in RAD51C can cause increased risk for ovarian cancer and breast cancer.  Studies have shown that the risk for ovarian cancer is around 5-9% (compared to 1-2% in the general population) for individuals with a pathogenic RAD51C mutation.  We discussed that the National Comprehensive Cancer Network (NCCN) recommends consideration of prophylactic salpingo-oophorectomy (surgical removal of the ovaries and fallopian tubes) for women with a pathogenic variant in RAD51C.      The exact risks for breast cancer for RAD51C mutation carriers are not well defined at this time. There are currently no medical management guidelines for breast cancer risk for individuals with RAD51C mutations.      Cancer Risks:   Harmful mutations in RAD51C can cause increased risk for ovarian cancer and breast cancer.  Studies have shown that the risk for ovarian cancer is around 5-9% (compared to 1-2% in the general population) for individuals with a pathogenic RAD51C mutation.  We discussed that the National Comprehensive Cancer Network (NCCN) recommends consideration of prophylactic salpingo-oophorectomy (surgical removal of the ovaries and fallopian tubes) for women with a pathogenic variant in RAD51C.      Cancer Screening Recommendations:  The following screening is recommended for women who have a mutation in the RAD51C  gene.    Consideration of prophylactic salpingo-oophorectomy at ages 45-50.  Fabienne has already had her uterus, ovaries, and fallopian tubes removed, thus greatly reducing the chance for a uterine or ovarian cancer.    NCCN states that there is not enough evidence to make a firm recommendation for the age of the procedure, but has suggested that a conversation regarding surgery should be held at 45-50 years old.    It is also important for Fabienne and her relatives to refer back to the family history for additional appropriate cancer screening:    Fabienne s mother's close female relatives remain at increased risk for breast cancer given their family history.  These women would be advised to undergo breast cancer screening beginning approximately 10 years younger than the earliest age of breast cancer diagnosis in the family, or at age 40, whichever comes first. Appropriate breast screening options should be discussed with an individual's care providers, to determine what screening is appropriate, or if additional screening (such as breast MRI) is necessary based on personal/family history factors.      We discussed that [place] breast cancer risk is 18.3% by the EFRAIN model, and should therefore follow population breast cancer screening.    As discussed previously, Fabienne's first-degree relatives are at an increased risk for colorectal cancer based on her history, and should be screened based on the family history.    Final screening recommendations should be made by each individual's managing physician. Other population cancer screening, such as recommendations by the American Cancer Society, are appropriate for Fabienne and her family.  These screening recommendations may change if there are changes to Fabienne's personal and/or family history.      Implications for Family Members:  We reviewed that mutations in RAD51C gene are inherited in an autosomal dominant pattern. This means that each of Fabienne's children have a 50%  "chance of inheriting the same mutation.  Likewise, each of her siblings also has a 50% risk of having the same mutation. I am happy to help her relatives connect with a genetic counselor in their area if they would like to discuss testing.  It would also be important for siblings to ask a genetic counselor testing for other breast cancer risk genes, as we are unable to determine which side of the family this mutation came from, and it is possible that other family members have a mutation in a different breast cancer risk gene.     In rare situations in which both parents have a mutation in the RAD51C gene, their children each have a 25% risk for Fanconi anemia. Fanconi anemia is a rare condition with onset in childhood.  Fanconi anemia often results in physical abnormalities, growth retardation, bone marrow failure, and increased risk for cancer.  In individuals of childbearing age with RAD51C mutations, genetic counseling and genetic testing may be advised for their partners. We discussed that this testing is also indicated for Fabienne's . He is interested in being tested, and will set up an appointment to see me when they come back for Fabienne's treatment.    Plan:  1.  I provided Fabienne with a copy of her test results and support resources today.  2.  I will provide a \"Dear Relative\" letter for Fabienne to share with her family members.  3.  She plans to follow up with her physicians.    If  Fabienne has additional questions, I encouraged her to contact  me directly at [phone].   Time spent face to face: 10 minutes.  Becky Amaya MS, [facility]  Certified Genetic Counselor  P. [phone]  F. [phone]                          "

## 2017-02-10 NOTE — NURSING NOTE
Chief Complaint   Patient presents with     Port Draw     Labs drawn by RN from port. VS taken.      Port accessed by RN with 20g 3/4in Power Port needle. Labs drawn, port flushed with NS and Heparin.   Meredith Soriano RN

## 2017-02-10 NOTE — MR AVS SNAPSHOT
After Visit Summary   2/10/2017    Fabienne Lynch    MRN: [id number]           Patient Information     Date Of Birth          1970        Visit Information        Provider Department      2/10/2017 8:00 AM Becky Amaya GC [facility]        Today's Diagnoses     Malignant neoplasm of rectum (H)        Rectal cancer (H)           Follow-ups after your visit        Your next 10 appointments already scheduled     Feb 24, 2017  9:00 AM CST   (Arrive by 8:45 AM)   MA DIAGNOSTIC BILATERAL W/ CESAR with UCBCMA2   [facility] ([facility])    [address] [place] [id number] [phone]           Do not use any powder, lotion or deodorant under your arms or on your breast. If you do, we will ask you to remove it before your exam.  Wear comfortable, two-piece clothing.  If you have any allergies, tell your care team.  Bring any previous mammograms from other facilities or have them mailed to the breast center.            Feb 24, 2017  9:30 AM CST   US BREAST LEFT LIMITED 1-3 QUAD with UCBCUS1   [facility] ([facility])    [address] [place] [id number] [phone]           Please bring a list of your medicines (including vitamins, minerals and over-the-counter drugs). Also, tell your doctor about any allergies you may have. Wear comfortable clothes and leave your valuables at home.  You do not need to do anything special to prepare for your exam.  Please call the Imaging Department at your exam site with any questions.            Mar 06, 2017 12:30 PM CST   [facility] Lab Draw with  MASONIC LAB DRAW   [facility] Draw ([facility])    [address] [place] [id number] [phone]            Mar 06, 2017  1:00 PM CST   (Arrive by 12:45 PM)   Return Visit with Xochitl Christine MD     [facility] ([facility])    [address]  2nd [address]  [facility] [place] [address] [phone]            Mar 06, 2017  2:00 PM CST   Infusion 60 with UC ONCOLOGY INFUSION, UC 15 ATC   [facility] ([facility])    [address]  2nd [facility] [id number] [phone]              Who to contact     If you have questions or need follow up information about today's clinic visit or your schedule please contact [facility] directly at [phone].  Normal or non-critical lab and imaging results will be communicated to you by Jackedhart, letter or phone within 4 business days after the clinic has received the results. If you do not hear from us within 7 days, please contact the clinic through Aereot or phone. If you have a critical or abnormal lab result, we will notify you by phone as soon as possible.  Submit refill requests through [employer] or call your pharmacy and they will forward the refill request to us. Please allow 3 business days for your refill to be completed.          Additional Information About Your Visit        JackedharWild Brain Information     [employer] gives you secure access to your electronic health record. If you see a primary care provider, you can also send messages to your care team and make appointments. If you have questions, please call your primary care clinic.  If you do not have a primary care provider, please call [phone] and they will assist you.        Care EveryWhere ID     This is your Care EveryWhere ID. This could be used by other organizations to access your [place] medical records  [id number]        Your Vitals Were     Pulse Temperature Respirations Pulse Oximetry BMI (Body Mass Index)       69 97.9  F (36.6  C) (Oral) 16 95% 23.91 kg/m2        Blood Pressure from Last 3 Encounters:   02/16/17 137/90   02/13/17 118/84   02/10/17 105/79    Weight from Last 3  Encounters:   02/16/17 63.1 kg (139 lb 3.2 oz)   02/13/17 61.4 kg (135 lb 6.4 oz)   02/10/17 62.2 kg (137 lb 1.6 oz)              We Performed the Following     *CBC with platelets differential     CEA     Comprehensive metabolic panel        Primary Care Provider Office Phone # Fax #    Golden Quinteros [phone]       [facility] [address]  [facility] [address]        Thank you!     Thank you for choosing [facility]  for your care. Our goal is always to provide you with excellent care. Hearing back from our patients is one way we can continue to improve our services. Please take a few minutes to complete the written survey that you may receive in the mail after your visit with us. Thank you!             Your Updated Medication List - Protect others around you: Learn how to safely use, store and throw away your medicines at www.disposemymeds.org.          This list is accurate as of: 2/10/17 11:59 PM.  Always use your most recent med list.                   Brand Name Dispense Instructions for use    * capecitabine 500 MG tablet CHEMO    XELODA     Take 1,000 mg/m2 by mouth 2 times daily Reported on 2/13/2017       * capecitabine 500 MG tablet CHEMO    XELODA    84 tablet    Take 3 tablets (1,500 mg) by mouth 2 times daily for 7 days on, then 7 days off       ondansetron 8 MG tablet    ZOFRAN    30 tablet    Take 1 tablet (8 mg) by mouth every 8 hours as needed for nausea       TYLENOL PO      Take 500 mg by mouth every 6 hours as needed Reported on 2/13/2017       * Notice:  This list has 2 medication(s) that are the same as other medications prescribed for you. Read the directions carefully, and ask your doctor or other care provider to review them with you.

## 2017-02-13 ENCOUNTER — INFUSION THERAPY VISIT (OUTPATIENT)
Dept: ONCOLOGY | Facility: CLINIC | Age: 47
End: 2017-02-13
Attending: INTERNAL MEDICINE
Payer: COMMERCIAL

## 2017-02-13 VITALS
HEART RATE: 76 BPM | DIASTOLIC BLOOD PRESSURE: 84 MMHG | RESPIRATION RATE: 16 BRPM | WEIGHT: 135.4 LBS | HEIGHT: 63 IN | SYSTOLIC BLOOD PRESSURE: 118 MMHG | BODY MASS INDEX: 23.99 KG/M2 | OXYGEN SATURATION: 98 % | TEMPERATURE: 97.8 F

## 2017-02-13 DIAGNOSIS — C20 RECTAL CANCER (H): Primary | ICD-10-CM

## 2017-02-13 LAB — ALBUMIN UR-MCNC: NEGATIVE MG/DL

## 2017-02-13 PROCEDURE — 81003 URINALYSIS AUTO W/O SCOPE: CPT | Performed by: INTERNAL MEDICINE

## 2017-02-13 PROCEDURE — 25000128 H RX IP 250 OP 636: Mod: ZF | Performed by: INTERNAL MEDICINE

## 2017-02-13 PROCEDURE — 96413 CHEMO IV INFUSION 1 HR: CPT

## 2017-02-13 PROCEDURE — 99215 OFFICE O/P EST HI 40 MIN: CPT | Mod: ZP | Performed by: INTERNAL MEDICINE

## 2017-02-13 RX ORDER — DIPHENHYDRAMINE HYDROCHLORIDE 50 MG/ML
50 INJECTION INTRAMUSCULAR; INTRAVENOUS
Status: CANCELLED
Start: 2017-02-13

## 2017-02-13 RX ORDER — ALBUTEROL SULFATE 0.83 MG/ML
2.5 SOLUTION RESPIRATORY (INHALATION)
Status: CANCELLED | OUTPATIENT
Start: 2017-02-13

## 2017-02-13 RX ORDER — METHYLPREDNISOLONE SODIUM SUCCINATE 125 MG/2ML
125 INJECTION, POWDER, LYOPHILIZED, FOR SOLUTION INTRAMUSCULAR; INTRAVENOUS
Status: CANCELLED
Start: 2017-02-13

## 2017-02-13 RX ORDER — ALBUTEROL SULFATE 90 UG/1
1-2 AEROSOL, METERED RESPIRATORY (INHALATION)
Status: CANCELLED
Start: 2017-02-13

## 2017-02-13 RX ORDER — HEPARIN SODIUM (PORCINE) LOCK FLUSH IV SOLN 100 UNIT/ML 100 UNIT/ML
5 SOLUTION INTRAVENOUS EVERY 8 HOURS
Status: DISCONTINUED | OUTPATIENT
Start: 2017-02-13 | End: 2017-02-13 | Stop reason: HOSPADM

## 2017-02-13 RX ORDER — LORAZEPAM 2 MG/ML
0.5 INJECTION INTRAMUSCULAR EVERY 4 HOURS PRN
Status: CANCELLED
Start: 2017-02-13

## 2017-02-13 RX ORDER — HEPARIN SODIUM (PORCINE) LOCK FLUSH IV SOLN 100 UNIT/ML 100 UNIT/ML
5 SOLUTION INTRAVENOUS EVERY 8 HOURS
Status: CANCELLED
Start: 2017-02-13

## 2017-02-13 RX ORDER — MEPERIDINE HYDROCHLORIDE 25 MG/ML
25 INJECTION INTRAMUSCULAR; INTRAVENOUS; SUBCUTANEOUS EVERY 30 MIN PRN
Status: CANCELLED | OUTPATIENT
Start: 2017-02-13

## 2017-02-13 RX ORDER — CAPECITABINE 500 MG/1
1000 TABLET, FILM COATED ORAL 2 TIMES DAILY
Qty: 84 TABLET | Refills: 0 | Status: SHIPPED | OUTPATIENT
Start: 2017-02-13 | End: 2017-03-14

## 2017-02-13 RX ORDER — EPINEPHRINE 0.3 MG/.3ML
0.3 INJECTION SUBCUTANEOUS EVERY 5 MIN PRN
Status: CANCELLED | OUTPATIENT
Start: 2017-02-13

## 2017-02-13 RX ORDER — SODIUM CHLORIDE 9 MG/ML
1000 INJECTION, SOLUTION INTRAVENOUS CONTINUOUS PRN
Status: CANCELLED
Start: 2017-02-13

## 2017-02-13 RX ADMIN — BEVACIZUMAB 465 MG: 400 INJECTION, SOLUTION INTRAVENOUS at 09:32

## 2017-02-13 RX ADMIN — SODIUM CHLORIDE, PRESERVATIVE FREE 5 ML: 5 INJECTION INTRAVENOUS at 10:06

## 2017-02-13 RX ADMIN — SODIUM CHLORIDE 250 ML: 9 INJECTION, SOLUTION INTRAVENOUS at 09:10

## 2017-02-13 ASSESSMENT — PAIN SCALES - GENERAL: PAINLEVEL: NO PAIN (0)

## 2017-02-13 NOTE — MR AVS SNAPSHOT
After Visit Summary   2/13/2017    Fabienne Lynch    MRN: [id number]           Patient Information     Date Of Birth          1970        Visit Information        Provider Department      2/13/2017 8:30 AM UC 16 ATC;  ONCOLOGY INFUSION [facility]        Today's Diagnoses     Rectal cancer (H)    -  1      Care Instructions    Contact Numbers    [facility] Main Line: [phone]  [facility] Triage:  [phone]    Call triage with chills and/or temperature greater than or equal to 100.5, uncontrolled nausea/vomiting, diarrhea, constipation, dizziness, shortness of breath, chest pain, bleeding, unexplained bruising, or any new/concerning symptoms, questions/concerns.     If you are having any concerning symptoms or wish to speak to a provider before your next infusion visit, please call your care coordinator or triage to notify them so we can adequately serve you.       After Hours: [phone]    If after hours, weekends, or holidays, call main hospital  and ask for Oncology doctor on call.         February 2017 Sunday Monday Tuesday Wednesday Thursday Friday Saturday                  1     2     3     4       5     6     7     8     9     10     [facility] MASONIC LAB DRAW    7:15 AM   (15 min.)   UC [facility]ONIC LAB DRAW   [facility] Lab Draw     CT CHEST/ABDOMEN/PELVIS W    7:25 AM   (20 min.)   UCCT2   [facility] [place]     UMP RETURN WITH ROOM    7:45 AM   (60 min.)   Becky Amaya GC   [facility] 11       12     13     UMP RETURN    7:30 AM   (30 min.)   Xochitl Christine MD   [facility]     UMP ONC INFUSION 60    8:30 AM   (60 min.)   UC ONCOLOGY INFUSION   [facility] 14     15     16     17     18       19     20     21     22     23     24     25       26     27     28                                    March 2017 Sunday Monday Tuesday Wednesday Thursday Friday Saturday                  1     2     3      4       5     6     UMP [facility] LAB DRAW   12:30 PM   (15 min.)   [facility] LAB DRAW   [facility]     UMP RETURN   12:45 PM   (30 min.)   Xochitl Christine MD   [facility]     UMP ONC INFUSION 60    2:00 PM   (60 min.)   UC ONCOLOGY INFUSION   [facility] 7     8     9     10     11       12     13     14     15     16     17     18       19     20     21     22     23     24     25       26     27     28     29     30     31                       Lab Results:  Recent Results (from the past 12 hour(s))   Protein qualitative urine    Collection Time: 02/13/17  8:45 AM   Result Value Ref Range    Protein Albumin Urine Negative NEG mg/dL             Follow-ups after your visit        Your next 10 appointments already scheduled     Mar 06, 2017 12:30 PM CST   [facility] Lab Draw with  MASONIC LAB DRAW   [facility] ([facility])    [address] [place] [address] [id number] [phone]            Mar 06, 2017  1:00 PM CST   (Arrive by 12:45 PM)   Return Visit with Xochitl Christine MD   [facility] ([facility])    [address] [place] [id number] [phone]            Mar 06, 2017  2:00 PM CST   Infusion 60 with UC ONCOLOGY INFUSION, UC 15 ATC   [facility] ([facility])    [address] [place] [address] [id number] [phone]              Who to contact     If you have questions or need follow up information about today's clinic visit or your schedule please contact [facility] directly at [phone].  Normal or non-critical lab and imaging results will be communicated to you by MyChart, letter or phone within 4 business days after the clinic has received the results. If you do not hear from us within 7 days, please contact the clinic through MyChart or phone. If  you have a critical or abnormal lab result, we will notify you by phone as soon as possible.  Submit refill requests through [employer] or call your pharmacy and they will forward the refill request to us. Please allow 3 business days for your refill to be completed.          Additional Information About Your Visit        Jimdohart Information     [employer] gives you secure access to your electronic health record. If you see a primary care provider, you can also send messages to your care team and make appointments. If you have questions, please call your primary care clinic.  If you do not have a primary care provider, please call [phone] and they will assist you.        Care EveryWhere ID     This is your Care EveryWhere ID. This could be used by other organizations to access your [place] medical records  [id number]         Blood Pressure from Last 3 Encounters:   02/13/17 118/84   02/10/17 105/79   01/20/17 112/77    Weight from Last 3 Encounters:   02/13/17 61.4 kg (135 lb 6.4 oz)   02/10/17 62.2 kg (137 lb 1.6 oz)   01/20/17 61.9 kg (136 lb 6.4 oz)              We Performed the Following     CBC with platelets differential     Comprehensive metabolic panel     MD Instruction for Protocol     Nursing Communication 1     Protein qualitative urine          Today's Medication Changes          These changes are accurate as of: 2/13/17  9:55 AM.  If you have any questions, ask your nurse or doctor.               These medicines have changed or have updated prescriptions.        Dose/Directions    * capecitabine 500 MG tablet CHEMO   Commonly known as:  XELODA   This may have changed:  Another medication with the same name was added. Make sure you understand how and when to take each.   Used for:  Rectal cancer (H)   Changed by:  Becky Interiano PA-C        Dose:  1000 mg/m2   Take 1,000 mg/m2 by mouth 2 times daily Reported on 2/13/2017   Refills:  0       * capecitabine 500 MG tablet CHEMO   Commonly known as:   XELODA   This may have changed:  Another medication with the same name was added. Make sure you understand how and when to take each.   Used for:  Rectal cancer (H)   Changed by:  Becky Cordova RN        Dose:  1000 mg/m2   Take 3 tablets (1,500 mg) by mouth 2 times daily for 7 days on, then 7 days off   Quantity:  84 tablet   Refills:  0       * capecitabine 500 MG tablet CHEMO   Commonly known as:  XELODA   This may have changed:  You were already taking a medication with the same name, and this prescription was added. Make sure you understand how and when to take each.   Used for:  Rectal cancer (H)   Changed by:  Xochitl Christine MD        Dose:  1000 mg/m2   Take 3 tablets (1,500 mg) by mouth 2 times daily for 7 days on, then 7 days off   Quantity:  84 tablet   Refills:  0       * Notice:  This list has 3 medication(s) that are the same as other medications prescribed for you. Read the directions carefully, and ask your doctor or other care provider to review them with you.         Where to get your medicines      These medications were sent to [address] [address]    Hours:  TRANSPLANT PHONE NUMBER [phone] Phone:  [phone]     capecitabine 500 MG tablet CHEMO                Primary Care Provider Office Phone # Fax #    Golden Quinteros [phone]       [address] [address]        Thank you!     Thank you for choosing [facility]  for your care. Our goal is always to provide you with excellent care. Hearing back from our patients is one way we can continue to improve our services. Please take a few minutes to complete the written survey that you may receive in the mail after your visit with us. Thank you!             Your Updated Medication List - Protect others around you: Learn how to safely use, store and throw away your  medicines at www.disposemymeds.org.          This list is accurate as of: 2/13/17  9:55 AM.  Always use your most recent med list.                   Brand Name Dispense Instructions for use    * capecitabine 500 MG tablet CHEMO    XELODA     Take 1,000 mg/m2 by mouth 2 times daily Reported on 2/13/2017       * capecitabine 500 MG tablet CHEMO    XELODA    84 tablet    Take 3 tablets (1,500 mg) by mouth 2 times daily for 7 days on, then 7 days off       * capecitabine 500 MG tablet CHEMO    XELODA    84 tablet    Take 3 tablets (1,500 mg) by mouth 2 times daily for 7 days on, then 7 days off       ondansetron 8 MG tablet    ZOFRAN    30 tablet    Take 1 tablet (8 mg) by mouth every 8 hours as needed for nausea       TYLENOL PO      Take 500 mg by mouth every 6 hours as needed Reported on 2/13/2017       * Notice:  This list has 3 medication(s) that are the same as other medications prescribed for you. Read the directions carefully, and ask your doctor or other care provider to review them with you.

## 2017-02-13 NOTE — PROGRESS NOTES
Infusion Nursing Note:  Patient presents today for Cycle 8 Day 1 Avastin.    Patient seen by provider today: Yes: Dr. Christine    Note: N/A.    Intravenous Access:  Implanted Port.    Treatment Conditions:  Lab Results   Component Value Date    HGB 13.4 02/10/2017     Lab Results   Component Value Date    WBC 4.6 02/10/2017      Lab Results   Component Value Date    ANEU 3.0 02/10/2017     Lab Results   Component Value Date    PLT 99 02/10/2017     12/19/2016      Lab Results   Component Value Date     02/10/2017                   Lab Results   Component Value Date    POTASSIUM 3.9 02/10/2017           No results found for: MAG         Lab Results   Component Value Date    CR 0.56 02/10/2017                   Lab Results   Component Value Date    ANDRZEJ 9.3 02/10/2017                Lab Results   Component Value Date    BILITOTAL 2.6 02/10/2017           Lab Results   Component Value Date    ALBUMIN 4.0 02/10/2017                    Lab Results   Component Value Date    ALT 45 02/10/2017           Lab Results   Component Value Date    AST 40 02/10/2017     Urine pH negative.      Post Infusion Assessment:  Patient tolerated infusion without incident.  Blood return noted pre and post infusion.  Site patent and intact, free from redness, edema or discomfort.  No evidence of extravasations.  Access discontinued per protocol.    Discharge Plan:   Prescription refills given for Xeloda.  Discharge instructions reviewed with: Patient and Family.  Patient and/or family verbalized understanding of discharge instructions and all questions answered.  Copy of AVS reviewed with patient and/or family.  Patient will return 3/6 for next appointment with Dr. Christine, scheduling working on appointment with radiation oncology.  Patient discharged in stable condition accompanied by: self and .  Departure Mode: Ambulatory.  Face to Face time: 5 minutes coordinating care.    Sukh Shelby RN.

## 2017-02-13 NOTE — NURSING NOTE
"Fabienne Lynch is a 46 year old female who presents for:  Chief Complaint   Patient presents with     Oncology Clinic Visit     Patient is here for CT Scan result         Initial Vitals:  /84 (BP Location: Right arm, Patient Position: Chair, Cuff Size: Adult Regular)  Pulse 76  Temp 97.8  F (36.6  C) (Oral)  Resp 16  Ht 1.6 m (5' 3\")  Wt 61.4 kg (135 lb 6.4 oz)  SpO2 98%  BMI 23.99 kg/m2 Estimated body mass index is 23.99 kg/(m^2) as calculated from the following:    Height as of this encounter: 1.6 m (5' 3\").    Weight as of this encounter: 61.4 kg (135 lb 6.4 oz).. Body surface area is 1.65 meters squared. BP completed using cuff size: regular  No Pain (0) No LMP recorded. Patient has had a hysterectomy. Allergies and medications reviewed.     Medications: MEDICATION REFILLS NEEDED TODAY.  Pharmacy name entered into EPIC:    [id number] - [place], [place] - [address] [facility] [place] - [place]BER - [address] [facility] [address] [place] [facility] - [place], [place] - [address] [place] [address] PHARMACY - MAIL ORDER ONLY - [place], [place]    Comments: Patient is not in any pain today. Refill for Capecitabine.  6 minutes for nursing intake (face to face time)   Radha Calix LPN      "

## 2017-02-13 NOTE — LETTER
"2/13/2017       RE: Fabienne Lynch  [address] [facility] [address]     Dear Colleague,    Thank you for referring your patient, Fabienne Lynch, to the [facility]. Please see a copy of my visit note below.    HISTORY OF PRESENT ILLNESS:  This is a followup visit for a diagnosis of metastatic rectal cancer.  The patient has been on maintenance chemotherapy with Xeloda and Avastin recently and is here to discuss her restaging scan that she had done on Friday.  On a 14-point review of systems, she states that she is feeling well.  Her energy is good.  She is able to do most of the activities that she wants to do.  She does notice that off and on she has noticed blood may intermix with her stool, but she does not have any fatigue or signs that her hemoglobin would be running low.  She continues to have pain in her heel and her joints at night particularly, but the neuropathy mostly with tingling and numbness has resolved for her.  She has more frequent bowel movements but not diarrhea, which she is managing well.  She did have a significant degree of hand-foot syndrome in the past, but now that she is moisturizing her hands and feet well, her hands and feet are doing much better.  She has no dizziness, chest pain or shortness of breath as per her report.       PHYSICAL EXAMINATION:    VITAL SIGNS:   /84 (BP Location: Right arm, Patient Position: Chair, Cuff Size: Adult Regular)  Pulse 76  Temp 97.8  F (36.6  C) (Oral)  Resp 16  Ht 1.6 m (5' 3\")  Wt 61.4 kg (135 lb 6.4 oz)  SpO2 98%  BMI 23.99 kg/m2    GENERAL:  Alert and oriented x3, no apparent distress.   SKIN:  She has significant hand-foot syndrome in terms of pigmentation but also has areas of cracking.  She has no areas of significant breakdown.  She had a manicure done and has significant trimming of her cuticles.    LOWER EXTREMITIES:  No pedal edema.   NEUROLOGIC:  Generally alert and oriented x3, moving all 4 extremities.  No " concerns for cranial nerve deficit.       LABORATORY EXAMINATION:  Labs are acceptable and reviewed with the patient.      IMAGING:  She has progression of disease at the original site of the tumor with the tumor enlarging from 1.8 cm to 2.9 cm today.  She has stable disease in the lungs and liver.      ASSESSMENT AND PLAN:  This is a patient with metastatic rectal cancer who was initially treated with 5-FU and oxaliplatin, then switched to Xeloda maintenance.  She comes in today with a restaging scan which shows progressive disease.  I had a detailed discussion with the patient and her  reviewing the images and what that meant going forward.  I suggested that she would be best served with doing radiation to the site with Xeloda as a chemo sensitizer/systemic therapy.  Her  really wanted to explore the option of surgery as well.  We had a detailed discussion regarding that in my mind if we do surgery, she would be out for several weeks from any sort of systemic therapy.  We have seen such a good response in her liver and lungs, and we may lose that response over time.  Nonetheless, I told them that I will discuss her case in colorectal tumor board and get Dr. Corets's opinion as well.  If they want to see her, I would make a referral.  As of right now, I would refer them to Dr. Thao for consideration of chemo/rads.  At this point, I would continue with Xeloda and Avastin for her since it is controlling her systemic disease until she get setup with radiation, and then we will move on with Xeloda and radiation.  She will return in 3 weeks to assess the next steps in treatment planning.     I spent 45 minutes in the care of this patient >50% of which was spent in coordinating and counseling.      Xochitl Christine   of Medicine   Hematology and medical Oncology   [facility]

## 2017-02-13 NOTE — PATIENT INSTRUCTIONS
We will help you schedule a visit with Radiation oncology. RTC in 3 weeks to assess next plan of action.

## 2017-02-13 NOTE — MR AVS SNAPSHOT
After Visit Summary   2/13/2017    Fabienne Lynch    MRN: [id number]           Patient Information     Date Of Birth          1970        Visit Information        Provider Department      2/13/2017 7:45 AM Xochitl Christine MD [facility]        Today's Diagnoses     Rectal cancer (H)    -  1      Care Instructions    We will help you schedule a visit with Radiation oncology. RTC in 3 weeks to assess next plan of action.         Follow-ups after your visit        Follow-up notes from your care team     Return in about 3 weeks (around 3/6/2017) for Physical Exam, Lab Work, Routine Visit.      Your next 10 appointments already scheduled     Feb 16, 2017  9:00 AM CST   (Arrive by 8:45 AM)   CONSULT with Lonny Marroquin MD   Radiation Oncology Clinic ([facility])    [facility]  1st Floor  [address] [facility] [address] [facility] [address]   [phone]            Feb 16, 2017  1:30 PM CST   TCT/SIM Suite Visit with Lonny Marroquin MD   Radiation Oncology Clinic ([facility])    [facility]  1st Floor  [address] [facility] [facility] [address]   [phone]            Mar 06, 2017 12:30 PM CST   [facility] Lab Draw with  [facility]   [facility] Lab Draw ([facility])    [address] [place] [id number] [phone]            Mar 06, 2017  1:00 PM CST   (Arrive by 12:45 PM)   Return Visit with Xochitl Christine MD   [facility] ([facility])    [address] [place] [address]   [phone]            Mar 06, 2017  2:00 PM CST   Infusion 60 with UC ONCOLOGY INFUSION, UC 15 ATC   [facility] ([facility])    [address] [place] [id number] [phone]              Who to contact     If you have questions or need follow  "up information about today's clinic visit or your schedule please contact [facility] directly at [phone].  Normal or non-critical lab and imaging results will be communicated to you by SeeOnhart, letter or phone within 4 business days after the clinic has received the results. If you do not hear from us within 7 days, please contact the clinic through [email or web address].met or phone. If you have a critical or abnormal lab result, we will notify you by phone as soon as possible.  Submit refill requests through [employer] or call your pharmacy and they will forward the refill request to us. Please allow 3 business days for your refill to be completed.          Additional Information About Your Visit        [employer] Information     [employer] gives you secure access to your electronic health record. If you see a primary care provider, you can also send messages to your care team and make appointments. If you have questions, please call your primary care clinic.  If you do not have a primary care provider, please call [phone] and they will assist you.        Care EveryWhere ID     This is your Care EveryWhere ID. This could be used by other organizations to access your [place] medical records  [id number]        Your Vitals Were     Pulse Temperature Respirations Height Pulse Oximetry BMI (Body Mass Index)    76 97.8  F (36.6  C) (Oral) 16 1.6 m (5' 3\") 98% 23.99 kg/m2       Blood Pressure from Last 3 Encounters:   02/13/17 118/84   02/10/17 105/79   01/20/17 112/77    Weight from Last 3 Encounters:   02/13/17 61.4 kg (135 lb 6.4 oz)   02/10/17 62.2 kg (137 lb 1.6 oz)   01/20/17 61.9 kg (136 lb 6.4 oz)              We Performed the Following     Radiation Oncology IP Consult: Patient to be seen: Routine - within 24 hours; rectal cancer; Consultant may enter orders: Yes          Today's Medication Changes          These changes are accurate as of: 2/13/17 11:59 PM.  If you have any questions, ask your nurse or " doctor.               These medicines have changed or have updated prescriptions.        Dose/Directions    * capecitabine 500 MG tablet CHEMO   Commonly known as:  XELODA   This may have changed:  Another medication with the same name was added. Make sure you understand how and when to take each.   Used for:  Rectal cancer (H)   Changed by:  Becky Interiano PA-C        Dose:  1000 mg/m2   Take 1,000 mg/m2 by mouth 2 times daily Reported on 2/13/2017   Refills:  0       * capecitabine 500 MG tablet CHEMO   Commonly known as:  XELODA   This may have changed:  Another medication with the same name was added. Make sure you understand how and when to take each.   Used for:  Rectal cancer (H)   Changed by:  Becky Cordova RN        Dose:  1000 mg/m2   Take 3 tablets (1,500 mg) by mouth 2 times daily for 7 days on, then 7 days off   Quantity:  84 tablet   Refills:  0       * capecitabine 500 MG tablet CHEMO   Commonly known as:  XELODA   This may have changed:  You were already taking a medication with the same name, and this prescription was added. Make sure you understand how and when to take each.   Used for:  Rectal cancer (H)   Changed by:  Xochitl Christine MD        Dose:  1000 mg/m2   Take 3 tablets (1,500 mg) by mouth 2 times daily for 7 days on, then 7 days off   Quantity:  84 tablet   Refills:  0       * Notice:  This list has 3 medication(s) that are the same as other medications prescribed for you. Read the directions carefully, and ask your doctor or other care provider to review them with you.         Where to get your medicines      These medications were sent to [address] [address]    Hours:  TRANSPLANT PHONE NUMBER [phone] Phone:  [phone]     capecitabine 500 MG tablet CHEMO                Primary Care Provider Office Phone # Fax #    Golden Quinteros [phone]        [facility] [address]  [place] [address]        Thank you!     Thank you for choosing [facility]  for your care. Our goal is always to provide you with excellent care. Hearing back from our patients is one way we can continue to improve our services. Please take a few minutes to complete the written survey that you may receive in the mail after your visit with us. Thank you!             Your Updated Medication List - Protect others around you: Learn how to safely use, store and throw away your medicines at www.disposemymeds.org.          This list is accurate as of: 2/13/17 11:59 PM.  Always use your most recent med list.                   Brand Name Dispense Instructions for use    * capecitabine 500 MG tablet CHEMO    XELODA     Take 1,000 mg/m2 by mouth 2 times daily Reported on 2/13/2017       * capecitabine 500 MG tablet CHEMO    XELODA    84 tablet    Take 3 tablets (1,500 mg) by mouth 2 times daily for 7 days on, then 7 days off       * capecitabine 500 MG tablet CHEMO    XELODA    84 tablet    Take 3 tablets (1,500 mg) by mouth 2 times daily for 7 days on, then 7 days off       ondansetron 8 MG tablet    ZOFRAN    30 tablet    Take 1 tablet (8 mg) by mouth every 8 hours as needed for nausea       TYLENOL PO      Take 500 mg by mouth every 6 hours as needed Reported on 2/13/2017       * Notice:  This list has 3 medication(s) that are the same as other medications prescribed for you. Read the directions carefully, and ask your doctor or other care provider to review them with you.

## 2017-02-13 NOTE — PROGRESS NOTES
"HISTORY OF PRESENT ILLNESS:  This is a followup visit for a diagnosis of metastatic rectal cancer.  The patient has been on maintenance chemotherapy with Xeloda and Avastin recently and is here to discuss her restaging scan that she had done on Friday.  On a 14-point review of systems, she states that she is feeling well.  Her energy is good.  She is able to do most of the activities that she wants to do.  She does notice that off and on she has noticed blood may intermix with her stool, but she does not have any fatigue or signs that her hemoglobin would be running low.  She continues to have pain in her heel and her joints at night particularly, but the neuropathy mostly with tingling and numbness has resolved for her.  She has more frequent bowel movements but not diarrhea, which she is managing well.  She did have a significant degree of hand-foot syndrome in the past, but now that she is moisturizing her hands and feet well, her hands and feet are doing much better.  She has no dizziness, chest pain or shortness of breath as per her report.       PHYSICAL EXAMINATION:    VITAL SIGNS:   /84 (BP Location: Right arm, Patient Position: Chair, Cuff Size: Adult Regular)  Pulse 76  Temp 97.8  F (36.6  C) (Oral)  Resp 16  Ht 1.6 m (5' 3\")  Wt 61.4 kg (135 lb 6.4 oz)  SpO2 98%  BMI 23.99 kg/m2    GENERAL:  Alert and oriented x3, no apparent distress.   SKIN:  She has significant hand-foot syndrome in terms of pigmentation but also has areas of cracking.  She has no areas of significant breakdown.  She had a manicure done and has significant trimming of her cuticles.    LOWER EXTREMITIES:  No pedal edema.   NEUROLOGIC:  Generally alert and oriented x3, moving all 4 extremities.  No concerns for cranial nerve deficit.       LABORATORY EXAMINATION:  Labs are acceptable and reviewed with the patient.      IMAGING:  She has progression of disease at the original site of the tumor with the tumor enlarging from 1.8 cm to " 2.9 cm today.  She has stable disease in the lungs and liver.      ASSESSMENT AND PLAN:  This is a patient with metastatic rectal cancer who was initially treated with 5-FU and oxaliplatin, then switched to Xeloda maintenance.  She comes in today with a restaging scan which shows progressive disease.  I had a detailed discussion with the patient and her  reviewing the images and what that meant going forward.  I suggested that she would be best served with doing radiation to the site with Xeloda as a chemo sensitizer/systemic therapy.  Her  really wanted to explore the option of surgery as well.  We had a detailed discussion regarding that in my mind if we do surgery, she would be out for several weeks from any sort of systemic therapy.  We have seen such a good response in her liver and lungs, and we may lose that response over time.  Nonetheless, I told them that I will discuss her case in colorectal tumor board and get Dr. Cortes's opinion as well.  If they want to see her, I would make a referral.  As of right now, I would refer them to Dr. Thao for consideration of chemo/rads.  At this point, I would continue with Xeloda and Avastin for her since it is controlling her systemic disease until she get setup with radiation, and then we will move on with Xeloda and radiation.  She will return in 3 weeks to assess the next steps in treatment planning.     I spent 45 minutes in the care of this patient >50% of which was spent in coordinating and counseling.      Xochitl Christine   of Medicine   Hematology and medical Oncology   [facility]

## 2017-02-13 NOTE — PATIENT INSTRUCTIONS
Contact Numbers    [facility] Main Line: [phone]  [facility] Triage:  [phone]    Call triage with chills and/or temperature greater than or equal to 100.5, uncontrolled nausea/vomiting, diarrhea, constipation, dizziness, shortness of breath, chest pain, bleeding, unexplained bruising, or any new/concerning symptoms, questions/concerns.     If you are having any concerning symptoms or wish to speak to a provider before your next infusion visit, please call your care coordinator or triage to notify them so we can adequately serve you.       After Hours: [phone]    If after hours, weekends, or holidays, call main hospital  and ask for Oncology doctor on call.         February 2017 Sunday Monday Tuesday Wednesday Thursday Friday Saturday                  1     2     3     4       5     6     7     8     9     10     [facility]    7:15 AM   (15 min.)    [facility] DRAW   [facility] Lab Draw     CT CHEST/ABDOMEN/PELVIS W    7:25 AM   (20 min.)   UCCT2   [facility] [place] CT     [facility] RETURN WITH ROOM    7:45 AM   (60 min.)   Becky Amaya GC   [facility] 11       12     13     [facility] RETURN    7:30 AM   (30 min.)   Xochitl Christine MD   [facility]     [facility] ONC INFUSION 60    8:30 AM   (60 min.)   [facility] ON[facility]Y INFUSION   [facility] 14     15     16     17     18       19     20     21     22     23     24     25       26     27     28                                    March 2017 Sunday Monday Tuesday Wednesday Thursday Friday Saturday                  1     2     3     4       5     6     [facility]   12:30 PM   (15 min.)   [facility]     [facility] RETURN   12:45 PM   (30 min.)   Xcohitl Christine MD   [facility]     [facility] ONC INFUSION 60    2:00 PM   (60 min.)   [facility] ON[facility] INFUSION   [facility] 7     8     9     10     11       12     13     14     15      16     17     18       19     20     21     22     23     24     25       26     27     28     29     30     31                       Lab Results:  Recent Results (from the past 12 hour(s))   Protein qualitative urine    Collection Time: 02/13/17  8:45 AM   Result Value Ref Range    Protein Albumin Urine Negative NEG mg/dL

## 2017-02-13 NOTE — LETTER
"2/13/2017      RE: Fabienne Lynch  [address] [facility] [id number]       HISTORY OF PRESENT ILLNESS:  This is a followup visit for a diagnosis of metastatic rectal cancer.  The patient has been on maintenance chemotherapy with Xeloda and Avastin recently and is here to discuss her restaging scan that she had done on Friday.  On a 14-point review of systems, she states that she is feeling well.  Her energy is good.  She is able to do most of the activities that she wants to do.  She does notice that off and on she has noticed blood may intermix with her stool, but she does not have any fatigue or signs that her hemoglobin would be running low.  She continues to have pain in her heel and her joints at night particularly, but the neuropathy mostly with tingling and numbness has resolved for her.  She has more frequent bowel movements but not diarrhea, which she is managing well.  She did have a significant degree of hand-foot syndrome in the past, but now that she is moisturizing her hands and feet well, her hands and feet are doing much better.  She has no dizziness, chest pain or shortness of breath as per her report.       PHYSICAL EXAMINATION:    VITAL SIGNS:   /84 (BP Location: Right arm, Patient Position: Chair, Cuff Size: Adult Regular)  Pulse 76  Temp 97.8  F (36.6  C) (Oral)  Resp 16  Ht 1.6 m (5' 3\")  Wt 61.4 kg (135 lb 6.4 oz)  SpO2 98%  BMI 23.99 kg/m2    GENERAL:  Alert and oriented x3, no apparent distress.   SKIN:  She has significant hand-foot syndrome in terms of pigmentation but also has areas of cracking.  She has no areas of significant breakdown.  She had a manicure done and has significant trimming of her cuticles.    LOWER EXTREMITIES:  No pedal edema.   NEUROLOGIC:  Generally alert and oriented x3, moving all 4 extremities.  No concerns for cranial nerve deficit.       LABORATORY EXAMINATION:  Labs are acceptable and reviewed with the patient.      IMAGING:  She has progression of " disease at the original site of the tumor with the tumor enlarging from 1.8 cm to 2.9 cm today.  She has stable disease in the lungs and liver.      ASSESSMENT AND PLAN:  This is a patient with metastatic rectal cancer who was initially treated with 5-FU and oxaliplatin, then switched to Xeloda maintenance.  She comes in today with a restaging scan which shows progressive disease.  I had a detailed discussion with the patient and her  reviewing the images and what that meant going forward.  I suggested that she would be best served with doing radiation to the site with Xeloda as a chemo sensitizer/systemic therapy.  Her  really wanted to explore the option of surgery as well.  We had a detailed discussion regarding that in my mind if we do surgery, she would be out for several weeks from any sort of systemic therapy.  We have seen such a good response in her liver and lungs, and we may lose that response over time.  Nonetheless, I told them that I will discuss her case in colorectal tumor board and get Dr. Cortes's opinion as well.  If they want to see her, I would make a referral.  As of right now, I would refer them to Dr. Thao for consideration of chemo/rads.  At this point, I would continue with Xeloda and Avastin for her since it is controlling her systemic disease until she get setup with radiation, and then we will move on with Xeloda and radiation.  She will return in 3 weeks to assess the next steps in treatment planning.     I spent 45 minutes in the care of this patient >50% of which was spent in coordinating and counseling.      Xohcitl Christine   of Medicine   Hematology and medical Oncology   [facility]

## 2017-02-16 ENCOUNTER — OFFICE VISIT (OUTPATIENT)
Dept: RADIATION ONCOLOGY | Facility: CLINIC | Age: 47
End: 2017-02-16
Attending: RADIOLOGY
Payer: COMMERCIAL

## 2017-02-16 VITALS — WEIGHT: 139.2 LBS | SYSTOLIC BLOOD PRESSURE: 137 MMHG | BODY MASS INDEX: 24.66 KG/M2 | DIASTOLIC BLOOD PRESSURE: 90 MMHG

## 2017-02-16 DIAGNOSIS — C20 RECTAL CANCER (H): Primary | ICD-10-CM

## 2017-02-16 PROCEDURE — 99215 OFFICE O/P EST HI 40 MIN: CPT | Mod: 25 | Performed by: RADIOLOGY

## 2017-02-16 ASSESSMENT — ENCOUNTER SYMPTOMS
MYALGIAS: 0
SENSORY CHANGE: 0
NERVOUS/ANXIOUS: 0
PALPITATIONS: 0
EYE DISCHARGE: 0
WEIGHT LOSS: 0
SEIZURES: 0
EYE REDNESS: 0
NECK PAIN: 0
SORE THROAT: 0
NAUSEA: 0
WEAKNESS: 0
WHEEZING: 0
HEARTBURN: 0
DIZZINESS: 0
DOUBLE VISION: 0
FREQUENCY: 0
HEMOPTYSIS: 0
PND: 0
DYSURIA: 0
CLAUDICATION: 0
SPUTUM PRODUCTION: 0
BLURRED VISION: 0
MEMORY LOSS: 0
SPEECH CHANGE: 0
BRUISES/BLEEDS EASILY: 0
CHILLS: 0
EYE PAIN: 0
LOSS OF CONSCIOUSNESS: 0
FOCAL WEAKNESS: 0
ORTHOPNEA: 0
DIARRHEA: 0
DEPRESSION: 0
HEMATURIA: 0
ABDOMINAL PAIN: 0
POLYDIPSIA: 0
HALLUCINATIONS: 0
TINGLING: 1
BACK PAIN: 1
DIAPHORESIS: 0
COUGH: 0
FALLS: 0
BLOOD IN STOOL: 0
HEADACHES: 0
STRIDOR: 0
VOMITING: 0
TREMORS: 0
CONSTIPATION: 0
INSOMNIA: 0
PHOTOPHOBIA: 0
FEVER: 0
SHORTNESS OF BREATH: 0

## 2017-02-16 ASSESSMENT — LIFESTYLE VARIABLES: SUBSTANCE_ABUSE: 0

## 2017-02-16 NOTE — PROGRESS NOTES
Radiation Oncology Consult  Patient comes in for initial consultation in the Radiation Oncology Department at the request of Dr. Christine.    Patient with stage IV rectal cancer.    History of Present Illness:  Fabienne is a pleasant 46 year old female in no acute distress.  She initially presented in October 2015 with left lower quadrant and pelvic pain.  In January of 2016 she noted hematochezia.  She was found to have large fibroids and had a hysterectomy on 2/2016.  Her pain continued and on CT scan on 2/26/16 she was found to have a 5.2 x 3.8 cm rectal mass with multiple liver lesions.  March 10th she was admitted for pain and had a debridement of the rectal tumor.  Pathology showed Invasive adenocarcinoma, moderate to poorly differentiated.  Lymphovascular invasion was identified. PET scan done on 3/8/16 large mass arising from the right lateral aspect of the rectum, multiple hypermetabolic lymph nodes in right internal iliac, right external iliac and tal rectal.  Small hypermetabolic deposit in left gluteus medius, multiple iver lesions consistent with metastasis, multiple pulmonary nodules, and deposit in pancreas suspicious for metastasis.      She started Xeloda and oxaliplatin (Xelox).  CT scan after 3 cycles on 5/5/16 showed a good response to treatment with improvement or resolution of all sites of disease.  She had 7 cycles total on 8/15 CT scan showed a mixed response to treatment with decrease size of rectal mass and decrease in tal rectal lymphadenopathy.  Mixed response of numerous hepatic metastasis and stable or decrease size of pulmonary nodules.  On 8/26 she was started on Avastin and Xeloda.  CT scan in 11/16/16 showed stable pulmonary nodules, stable liver mets, and stable soft tissue along the right side of the rectum.  She has been maintained on Xeloda.  She has tolerated the Xeloda very well.  She has noticed increase rectal bleeding, which is never heavy and increase in pressure in her bottom  and lower back.  CT scan on 2/10/17 shows increased size of a soft tissue nodule in the posterior midline mesorectal fascia representing progression of rectal carcinoma. 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    She overall is feeling very well.  She lives in [place] and works in [employer], so would like to receive radiation at [place].      Previous Radiation:  None    Previous Chemotherapy:  As above per HPI.    Implantable Cardiac Device (ICD):  NONE    Medications:  Current Outpatient Prescriptions   Medication     capecitabine (XELODA) 500 MG tablet CHEMO     capecitabine (XELODA) 500 MG tablet CHEMO     capecitabine (XELODA) 500 MG tablet     ondansetron (ZOFRAN) 8 MG tablet     Acetaminophen (TYLENOL PO)     No current facility-administered medications for this visit.      Allergies:  Allergies   Allergen Reactions     Nitrous Oxide Nausea and Vomiting     Past Medical History:  Past Medical History   Diagnosis Date     Abdominal pain      Rectal cancer (H)      Past Surgical History:  Past Surgical History   Procedure Laterality Date     Hysterectomy       Colonoscopy with co2 insufflation N/A 3/3/2016     Procedure: COLONOSCOPY WITH CO2 INSUFFLATION;  Surgeon: Lindsey Felipe MD;  Location: [facility] OR     Exam under anesthesia, excise lesion rectum, combined N/A 3/10/2016     Procedure: COMBINED EXAM UNDER ANESTHESIA, EXCISE LESION RECTUM;  Surgeon: Lindsey Felipe MD;  Location: [facility] OR       Family History:  family history includes Breast Cancer in her mother; CANCER in her paternal uncle.        Social History:  Patient is  and lives in [place], [place].  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  Has 2 children.    Pain Assessment 0-10:  Patient rates pain at a 0.    Review of Symptoms:  Constitutional: Negative for fever, chills, weight loss and malaise/fatigue.   Overall patient feels well.  HENT: Negative  for nosebleeds, congestion, sore throat and neck pain.   Respiratory: Negative for cough, hemoptysis, sputum production, shortness of breath and wheezing.   Cardiovascular: Negative for chest pain, palpitations, claudication, leg swelling and PND.   Gastrointestinal: Negative for heartburn, nausea, vomiting, abdominal pain, diarrhea, constipation.  She does have malformed stools streaked with blood.  Genitourinary: Negative for dysuria.   Musculoskeletal: Negative for myalgias and joint pain. Has low back pain in the evening.  Skin: Negative for itching and rash.   Neurological: Negative for dizziness, tingling, weakness and headaches.   Endo/Heme/Allergies: Does not bruise/bleed easily.   Psychiatric/Behavioral: Negative for depression and suicidal ideas. The patient is not nervous/anxious.     Physical Exam:  /90  Wt 63.1 kg (139 lb 3.2 oz)  BMI 24.66 kg/m2  GENERAL: Well-developed, well-nourished, globally oriented.   HEENT: Normocephalic, atraumatic. Oral cavity and oropharynx without mucosal lesions.   NECK: Supple, full range of motion, no palpable cervical or supraclavicular lymphadenopathy.   BREAST:  Normal without any masses.  LUNGS: Clear to auscultation bilaterally.   CARDIOVASCULAR: Regular rate and rhythm without murmur.   ABDOMEN: Soft, nondistended, nontender, no hepatosplenomegaly.  ANORECTAL: Defered  EXTREMITIES: Without cyanosis, clubbing or edema. Hand foot syndrome from Xeloda  NEUROLOGIC: Alert and oriented.  Gait normal.     Labs:  CBC RESULTS:   Recent Labs   Lab Test  02/10/17   0803   WBC  4.6   RBC  3.82   HGB  13.4   HCT  37.8   MCV  99   MCH  35.1*   MCHC  35.4   RDW  14.8   PLT  99*       Pregnancy status:  No results found for: HCGQUANT    All pertinent labs, scans, and test results have been reviewed.    Assessment/Plan:  This is 46 year old female who has stage IV rectal cancer, locally extensive as well as with distant metastasis, status post combination chemotherapy with  partial response. She presents to discussed possible preop chemoradiation with plan of surgery for pelvic disease. Since she responded other distant metastasis reasonably well, it is quite feasible approach.   She wants have radiation in [facility] close to her home and work.  She is arranged to see Dr. Cheng Chadwick at [facility], radiation oncology on Monday, 2/20/2017.    Thank you,    Lonny Marroquin MD  Pager: [phone]

## 2017-02-16 NOTE — NURSING NOTE
INITIAL PATIENT ASSESSMENT    Diagnosis: Rectal Cancer    Prior radiation therapy: None    Prior chemotherapy:   Protocol: Xeloda and Avastin  Facility: [facility]  Dates: Current      Prior hormonal therapy:No    Pain Eval:  Denies    Psychosocial  Living arrangements: Lives with  and 2 grown children  Fall Risk: independent   referral needs: Not needed    Advanced Directive: Yes - Location: At Home  Implantable Cardiac Device? No

## 2017-02-16 NOTE — PROGRESS NOTES
HPI      Review of Systems   Constitutional: Negative for chills, diaphoresis, fever, malaise/fatigue and weight loss.   HENT: Negative for congestion, ear discharge, ear pain, hearing loss, nosebleeds, sore throat and tinnitus.    Eyes: Negative for blurred vision, double vision, photophobia, pain, discharge and redness.   Respiratory: Negative for cough, hemoptysis, sputum production, shortness of breath, wheezing and stridor.    Cardiovascular: Negative for chest pain, palpitations, orthopnea, claudication, leg swelling and PND.   Gastrointestinal: Negative for abdominal pain, blood in stool, constipation, diarrhea, heartburn, melena, nausea and vomiting.   Genitourinary: Negative for dysuria, frequency, hematuria and urgency.   Musculoskeletal: Positive for back pain. Negative for falls, joint pain, myalgias and neck pain.   Skin: Negative for itching and rash.   Neurological: Positive for tingling. Negative for dizziness, tremors, sensory change, speech change, focal weakness, seizures, loss of consciousness, weakness and headaches.        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 Does not bruise/bleed easily.   Psychiatric/Behavioral: Negative for depression, hallucinations, memory loss, substance abuse and suicidal ideas. The patient is not nervous/anxious and does not have insomnia.

## 2017-02-16 NOTE — LETTER
2/16/2017       RE: Fabienne Lynch  [address] [address]     Dear Colleague,    Thank you for referring your patient, Fabienne Lynch, to the RADIATION ONCOLOGY CLINIC. Please see a copy of my visit note below.    Radiation Oncology Consult  Patient comes in for initial consultation in the Radiation Oncology Department at the request of Dr. Christine.    Patient with stage IV rectal cancer.    History of Present Illness:  Fabienne is a pleasant 46 year old female in no acute distress.  She initially presented in October 2015 with left lower quadrant and pelvic pain.  In January of 2016 she noted hematochezia.  She was found to have large fibroids and had a hysterectomy on 2/2016.  Her pain continued and on CT scan on 2/26/16 she was found to have a 5.2 x 3.8 cm rectal mass with multiple liver lesions.  March 10th she was admitted for pain and had a debridement of the rectal tumor.  Pathology showed Invasive adenocarcinoma, moderate to poorly differentiated.  Lymphovascular invasion was identified. PET scan done on 3/8/16 large mass arising from the right lateral aspect of the rectum, multiple hypermetabolic lymph nodes in right internal iliac, right external iliac and tal rectal.  Small hypermetabolic deposit in left gluteus medius, multiple iver lesions consistent with metastasis, multiple pulmonary nodules, and deposit in pancreas suspicious for metastasis.      She started Xeloda and oxaliplatin (Xelox).  CT scan after 3 cycles on 5/5/16 showed a good response to treatment with improvement or resolution of all sites of disease.  She had 7 cycles total on 8/15 CT scan showed a mixed response to treatment with decrease size of rectal mass and decrease in tal rectal lymphadenopathy.  Mixed response of numerous hepatic metastasis and stable or decrease size of pulmonary nodules.  On 8/26 she was started on Avastin and Xeloda.  CT scan in 11/16/16 showed stable pulmonary nodules, stable liver mets, and  stable soft tissue along the right side of the rectum.  She has been maintained on Xeloda.  She has tolerated the Xeloda very well.  She has noticed increase rectal bleeding, which is never heavy and increase in pressure in her bottom and lower back.  CT scan on 2/10/17 shows increased size of a soft tissue nodule in the posterior midline mesorectal fascia representing progression of rectal carcinoma. 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    She overall is feeling very well.  She lives in [place] and works in [employer], so would like to receive radiation at [place].      Previous Radiation:  None    Previous Chemotherapy:  As above per HPI.    Implantable Cardiac Device (ICD):  NONE    Medications:  Current Outpatient Prescriptions   Medication     capecitabine (XELODA) 500 MG tablet CHEMO     capecitabine (XELODA) 500 MG tablet CHEMO     capecitabine (XELODA) 500 MG tablet     ondansetron (ZOFRAN) 8 MG tablet     Acetaminophen (TYLENOL PO)     No current facility-administered medications for this visit.      Allergies:  Allergies   Allergen Reactions     Nitrous Oxide Nausea and Vomiting     Past Medical History:  Past Medical History   Diagnosis Date     Abdominal pain      Rectal cancer (H)      Past Surgical History:  Past Surgical History   Procedure Laterality Date     Hysterectomy       Colonoscopy with co2 insufflation N/A 3/3/2016     Procedure: COLONOSCOPY WITH CO2 INSUFFLATION;  Surgeon: Lindsey Felipe MD;  Location: [facility] OR     Exam under anesthesia, excise lesion rectum, combined N/A 3/10/2016     Procedure: COMBINED EXAM UNDER ANESTHESIA, EXCISE LESION RECTUM;  Surgeon: Lindsey Felipe MD;  Location: [facility] OR       Family History:  family history includes Breast Cancer in her mother; CANCER in her paternal uncle.        Social History:  Patient is  and lives in [place], [place].  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  Has 2 children.    Pain Assessment 0-10:  Patient rates pain at a 0.    Review of Symptoms:  Constitutional: Negative for fever, chills, weight loss and malaise/fatigue.   Overall patient feels well.  HENT: Negative for nosebleeds, congestion, sore throat and neck pain.   Respiratory: Negative for cough, hemoptysis, sputum production, shortness of breath and wheezing.   Cardiovascular: Negative for chest pain, palpitations, claudication, leg swelling and PND.   Gastrointestinal: Negative for heartburn, nausea, vomiting, abdominal pain, diarrhea, constipation.  She does have malformed stools streaked with blood.  Genitourinary: Negative for dysuria.   Musculoskeletal: Negative for myalgias and joint pain. Has low back pain in the evening.  Skin: Negative for itching and rash.   Neurological: Negative for dizziness, tingling, weakness and headaches.   Endo/Heme/Allergies: Does not bruise/bleed easily.   Psychiatric/Behavioral: Negative for depression and suicidal ideas. The patient is not nervous/anxious.     Physical Exam:  /90  Wt 63.1 kg (139 lb 3.2 oz)  BMI 24.66 kg/m2  GENERAL: Well-developed, well-nourished, globally oriented.   HEENT: Normocephalic, atraumatic. Oral cavity and oropharynx without mucosal lesions.   NECK: Supple, full range of motion, no palpable cervical or supraclavicular lymphadenopathy.   BREAST:  Normal without any masses.  LUNGS: Clear to auscultation bilaterally.   CARDIOVASCULAR: Regular rate and rhythm without murmur.   ABDOMEN: Soft, nondistended, nontender, no hepatosplenomegaly.  ANORECTAL: Defered  EXTREMITIES: Without cyanosis, clubbing or edema. Hand foot syndrome from Xeloda  NEUROLOGIC: Alert and oriented.  Gait normal.     Labs:  CBC RESULTS:   Recent Labs   Lab Test  02/10/17   0803   WBC  4.6   RBC  3.82   HGB  13.4   HCT  37.8   MCV  99   MCH  35.1*   MCHC  35.4   RDW  14.8   PLT  99*       Pregnancy status:  No results found for: HCGQUANT    All  pertinent labs, scans, and test results have been reviewed.    Assessment/Plan:  This is 46 year old female who has stage IV rectal cancer, locally extensive as well as with distant metastasis, status post combination chemotherapy with partial response. She presents to discussed possible preop chemoradiation with plan of surgery for pelvic disease. Since she responded other distant metastasis reasonably well, it is quite feasible approach.   She wants have radiation in [facility] close to her home and work.  She is arranged to see Dr. Cheng Chadwick at [facility], radiation oncology on Monday, 2/20/2017.    Thank you,    Lonny Marroquin MD  Pager: [phone]            HPI      Review of Systems   Constitutional: Negative for chills, diaphoresis, fever, malaise/fatigue and weight loss.   HENT: Negative for congestion, ear discharge, ear pain, hearing loss, nosebleeds, sore throat and tinnitus.    Eyes: Negative for blurred vision, double vision, photophobia, pain, discharge and redness.   Respiratory: Negative for cough, hemoptysis, sputum production, shortness of breath, wheezing and stridor.    Cardiovascular: Negative for chest pain, palpitations, orthopnea, claudication, leg swelling and PND.   Gastrointestinal: Negative for abdominal pain, blood in stool, constipation, diarrhea, heartburn, melena, nausea and vomiting.   Genitourinary: Negative for dysuria, frequency, hematuria and urgency.   Musculoskeletal: Positive for back pain. Negative for falls, joint pain, myalgias and neck pain.   Skin: Negative for itching and rash.   Neurological: Positive for tingling. Negative for dizziness, tremors, sensory change, speech change, focal weakness, seizures, loss of consciousness, weakness and headaches.        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 Does not bruise/bleed easily.   Psychiatric/Behavioral: Negative for depression, hallucinations, memory  loss, substance abuse and suicidal ideas. The patient is not nervous/anxious and does not have insomnia.      Again, thank you for allowing me to participate in the care of your patient.      Sincerely,    Lonny Marroquin MD

## 2017-02-16 NOTE — MR AVS SNAPSHOT
After Visit Summary   2/16/2017    Fabienne Lynch    MRN: [id number]           Patient Information     Date Of Birth          1970        Visit Information        Provider Department      2/16/2017 9:00 AM Lonny Marroquin MD [facility]        Today's Diagnoses     Rectal cancer (H)    -  1       Follow-ups after your visit        Your next 10 appointments already scheduled     Feb 24, 2017  9:00 AM CST   (Arrive by 8:45 AM)   MA DIAGNOSTIC BILATERAL W/ CESAR with UCBCMA2   [facility] ([facility])    [address] [place] [id number] [phone]           Do not use any powder, lotion or deodorant under your arms or on your breast. If you do, we will ask you to remove it before your exam.  Wear comfortable, two-piece clothing.  If you have any allergies, tell your care team.  Bring any previous mammograms from other facilities or have them mailed to the breast center.            Feb 24, 2017  9:30 AM CST   US BREAST LEFT LIMITED 1-3 QUAD with UCBCUS1   [facility] ([facility])    [address] [place] [id number] [phone]           Please bring a list of your medicines (including vitamins, minerals and over-the-counter drugs). Also, tell your doctor about any allergies you may have. Wear comfortable clothes and leave your valuables at home.  You do not need to do anything special to prepare for your exam.  Please call the Imaging Department at your exam site with any questions.            Mar 06, 2017 12:30 PM CST   [facility] Lab Draw with  [employer] LAB DRAW   [facility] Draw ([facility])    [address] [place] [id number] [phone]            Mar 06, 2017  1:00 PM CST   (Arrive by 12:45 PM)   Return Visit with Xochitl Christine MD   [facility] ([facility]  [facility])    [address]  [address] [facility] [id number] [phone]            Mar 06, 2017  2:00 PM CST   Infusion 60 with UC ONCOLOGY INFUSION, UC 15 ATC   [facility] ([facility])    [address] [phone] [phone]              Who to contact     Please call your clinic at [phone] to:    Ask questions about your health    Make or cancel appointments    Discuss your medicines    Learn about your test results    Speak to your doctor   If you have compliments or concerns about an experience at your clinic, or if you wish to file a complaint, please contact [facility] Patient Relations at [phone] or email us at [email or web address]         Additional Information About Your Visit        [employer] Information     [employer] gives you secure access to your electronic health record. If you see a primary care provider, you can also send messages to your care team and make appointments. If you have questions, please call your primary care clinic.  If you do not have a primary care provider, please call [phone] and they will assist you.      [employer] is an electronic gateway that provides easy, online access to your medical records. With [employer], you can request a clinic appointment, read your test results, renew a prescription or communicate with your care team.     To access your existing account, please contact your [facility] or call [phone] for assistance.        Care EveryWhere ID     This is your Care EveryWhere ID. This could be used by other organizations to access your [place] medical records  [id number]        Your Vitals Were     BMI (Body Mass Index)                   24.66 kg/m2            Blood Pressure from Last 3 Encounters:   02/16/17 137/90   02/13/17 118/84   02/10/17 105/79    Weight from Last 3  Encounters:   02/16/17 63.1 kg (139 lb 3.2 oz)   02/13/17 61.4 kg (135 lb 6.4 oz)   02/10/17 62.2 kg (137 lb 1.6 oz)              Today, you had the following     No orders found for display       Primary Care Provider Office Phone # Fax #    Golden Quinteros [phone]       [facility] [address]  [facility] [address]        Thank you!     Thank you for choosing [facility]  for your care. Our goal is always to provide you with excellent care. Hearing back from our patients is one way we can continue to improve our services. Please take a few minutes to complete the written survey that you may receive in the mail after your visit with us. Thank you!             Your Updated Medication List - Protect others around you: Learn how to safely use, store and throw away your medicines at www.disposemymeds.org.          This list is accurate as of: 2/16/17 11:59 PM.  Always use your most recent med list.                   Brand Name Dispense Instructions for use    * capecitabine 500 MG tablet CHEMO    XELODA     Take 1,000 mg/m2 by mouth 2 times daily Reported on 2/13/2017       * capecitabine 500 MG tablet CHEMO    XELODA    84 tablet    Take 3 tablets (1,500 mg) by mouth 2 times daily for 7 days on, then 7 days off       * capecitabine 500 MG tablet CHEMO    XELODA    84 tablet    Take 3 tablets (1,500 mg) by mouth 2 times daily for 7 days on, then 7 days off       ondansetron 8 MG tablet    ZOFRAN    30 tablet    Take 1 tablet (8 mg) by mouth every 8 hours as needed for nausea       TYLENOL PO      Take 500 mg by mouth every 6 hours as needed Reported on 2/13/2017       * Notice:  This list has 3 medication(s) that are the same as other medications prescribed for you. Read the directions carefully, and ask your doctor or other care provider to review them with you.

## 2017-02-17 ENCOUNTER — MYC MEDICAL ADVICE (OUTPATIENT)
Dept: ONCOLOGY | Facility: CLINIC | Age: 47
End: 2017-02-17

## 2017-02-22 ENCOUNTER — DOCUMENTATION ONLY (OUTPATIENT)
Dept: ONCOLOGY | Facility: CLINIC | Age: 47
End: 2017-02-22

## 2017-02-22 NOTE — PROGRESS NOTES
Dr. Christine completed [facility] [employer] [place]ing ID permit for Fabienne Lynch.  Original mailed to patient's address on file and copy emailed to her as well.

## 2017-03-14 DIAGNOSIS — C20 RECTAL CANCER (H): Primary | ICD-10-CM

## 2017-03-14 RX ORDER — LORAZEPAM 0.5 MG/1
0.5 TABLET ORAL EVERY 4 HOURS PRN
Qty: 30 TABLET | Refills: 2 | Status: SHIPPED | OUTPATIENT
Start: 2017-03-14 | End: 2017-04-26

## 2017-03-14 RX ORDER — CAPECITABINE 500 MG/1
825 TABLET, FILM COATED ORAL 2 TIMES DAILY
Qty: 168 TABLET | Refills: 0 | Status: SHIPPED
Start: 2017-03-14 | End: 2017-06-21

## 2017-03-14 RX ORDER — PROCHLORPERAZINE MALEATE 10 MG
10 TABLET ORAL EVERY 6 HOURS PRN
Qty: 30 TABLET | Refills: 2 | Status: SHIPPED | OUTPATIENT
Start: 2017-03-14 | End: 2017-04-26

## 2017-03-15 ENCOUNTER — APPOINTMENT (OUTPATIENT)
Dept: LAB | Facility: CLINIC | Age: 47
End: 2017-03-15
Attending: INTERNAL MEDICINE
Payer: COMMERCIAL

## 2017-03-15 ENCOUNTER — ONCOLOGY VISIT (OUTPATIENT)
Dept: ONCOLOGY | Facility: CLINIC | Age: 47
End: 2017-03-15
Attending: INTERNAL MEDICINE
Payer: COMMERCIAL

## 2017-03-15 VITALS
WEIGHT: 135.1 LBS | DIASTOLIC BLOOD PRESSURE: 89 MMHG | BODY MASS INDEX: 23.93 KG/M2 | SYSTOLIC BLOOD PRESSURE: 122 MMHG | RESPIRATION RATE: 18 BRPM | OXYGEN SATURATION: 99 % | TEMPERATURE: 97.5 F | HEART RATE: 72 BPM

## 2017-03-15 DIAGNOSIS — C20 RECTAL CANCER (H): Primary | ICD-10-CM

## 2017-03-15 LAB
ALBUMIN SERPL-MCNC: 4 G/DL (ref 3.4–5)
ALP SERPL-CCNC: 89 U/L (ref 40–150)
ALT SERPL W P-5'-P-CCNC: 37 U/L (ref 0–50)
ANION GAP SERPL CALCULATED.3IONS-SCNC: 8 MMOL/L (ref 3–14)
AST SERPL W P-5'-P-CCNC: 34 U/L (ref 0–45)
BASOPHILS # BLD AUTO: 0 10E9/L (ref 0–0.2)
BASOPHILS NFR BLD AUTO: 0.5 %
BILIRUB SERPL-MCNC: 2.6 MG/DL (ref 0.2–1.3)
BUN SERPL-MCNC: 18 MG/DL (ref 7–30)
CALCIUM SERPL-MCNC: 9.3 MG/DL (ref 8.5–10.1)
CHLORIDE SERPL-SCNC: 109 MMOL/L (ref 94–109)
CO2 SERPL-SCNC: 28 MMOL/L (ref 20–32)
CREAT SERPL-MCNC: 0.61 MG/DL (ref 0.52–1.04)
DIFFERENTIAL METHOD BLD: ABNORMAL
EOSINOPHIL # BLD AUTO: 0.1 10E9/L (ref 0–0.7)
EOSINOPHIL NFR BLD AUTO: 2 %
ERYTHROCYTE [DISTWIDTH] IN BLOOD BY AUTOMATED COUNT: 15.9 % (ref 10–15)
GFR SERPL CREATININE-BSD FRML MDRD: ABNORMAL ML/MIN/1.7M2
GLUCOSE SERPL-MCNC: 95 MG/DL (ref 70–99)
HCT VFR BLD AUTO: 37.6 % (ref 35–47)
HGB BLD-MCNC: 13.2 G/DL (ref 11.7–15.7)
IMM GRANULOCYTES # BLD: 0 10E9/L (ref 0–0.4)
IMM GRANULOCYTES NFR BLD: 0.2 %
LYMPHOCYTES # BLD AUTO: 1 10E9/L (ref 0.8–5.3)
LYMPHOCYTES NFR BLD AUTO: 23.3 %
MCH RBC QN AUTO: 34.2 PG (ref 26.5–33)
MCHC RBC AUTO-ENTMCNC: 35.1 G/DL (ref 31.5–36.5)
MCV RBC AUTO: 97 FL (ref 78–100)
MONOCYTES # BLD AUTO: 0.3 10E9/L (ref 0–1.3)
MONOCYTES NFR BLD AUTO: 6.6 %
NEUTROPHILS # BLD AUTO: 3 10E9/L (ref 1.6–8.3)
NEUTROPHILS NFR BLD AUTO: 67.4 %
NRBC # BLD AUTO: 0 10*3/UL
NRBC BLD AUTO-RTO: 0 /100
PLATELET # BLD AUTO: 106 10E9/L (ref 150–450)
POTASSIUM SERPL-SCNC: 4 MMOL/L (ref 3.4–5.3)
PROT SERPL-MCNC: 7.2 G/DL (ref 6.8–8.8)
RBC # BLD AUTO: 3.86 10E12/L (ref 3.8–5.2)
SODIUM SERPL-SCNC: 144 MMOL/L (ref 133–144)
WBC # BLD AUTO: 4.4 10E9/L (ref 4–11)

## 2017-03-15 PROCEDURE — 36591 DRAW BLOOD OFF VENOUS DEVICE: CPT

## 2017-03-15 PROCEDURE — 99214 OFFICE O/P EST MOD 30 MIN: CPT | Mod: ZP | Performed by: INTERNAL MEDICINE

## 2017-03-15 PROCEDURE — 80053 COMPREHEN METABOLIC PANEL: CPT | Performed by: INTERNAL MEDICINE

## 2017-03-15 PROCEDURE — 25000128 H RX IP 250 OP 636: Mod: ZF | Performed by: INTERNAL MEDICINE

## 2017-03-15 PROCEDURE — 85025 COMPLETE CBC W/AUTO DIFF WBC: CPT | Performed by: INTERNAL MEDICINE

## 2017-03-15 PROCEDURE — 99212 OFFICE O/P EST SF 10 MIN: CPT | Mod: ZF

## 2017-03-15 RX ORDER — HEPARIN SODIUM (PORCINE) LOCK FLUSH IV SOLN 100 UNIT/ML 100 UNIT/ML
5 SOLUTION INTRAVENOUS DAILY PRN
Status: DISCONTINUED | OUTPATIENT
Start: 2017-03-15 | End: 2017-03-20 | Stop reason: HOSPADM

## 2017-03-15 RX ADMIN — SODIUM CHLORIDE, PRESERVATIVE FREE 5 ML: 5 INJECTION INTRAVENOUS at 07:19

## 2017-03-15 ASSESSMENT — PAIN SCALES - GENERAL: PAINLEVEL: NO PAIN (0)

## 2017-03-15 NOTE — LETTER
3/15/2017       RE: Fabienne Lynch  [address] [facility] [address]     Dear Colleague,    Thank you for referring your patient, Fabienne Lynch, to the [facility]. Please see a copy of my visit note below.    HISTORY OF PRESENT ILLNESS:  This is a followup visit for a diagnosis of metastatic rectal carcinoma.  Kindly refer to my prior notes for details of the patient's history.  The patient was initially diagnosed with a symptomatic rectal mass and metastatic disease to her liver and probably lung.  She received chemotherapy with FOLFOX initially, transitioned to XELOX and most recently was getting Xeloda and Avastin for maintenance.  Unfortunately, on her last scan, she had progressive disease at the site of original cancer, but stable disease everywhere else.  Hence, we decided to pursue chemoradiation for the native tumor.  The patient had gone to spring break with her kids for vacation.  She returns and she is starting radiation now.  She states that yesterday was her first radiation treatment and she is tolerating it well.  Her last dose of Xeloda on a week on, week off schedule was last Friday.  She returns today to discuss chemotherapy.      REVIEW OF SYSTEMS:  On a 14-point review of systems, she is doing well.  She states that she can feel the tumor has grown as it is causing her some symptoms and rectal discomfort and occasional blood in her stool, but otherwise she feels good.  No fevers, chills, cough, chest pain, shortness of breath, abdominal pain or vision problems.  She does have some dry skin patches on her face that she was a little concerned about.      MEDICATIONS:   Current Outpatient Prescriptions   Medication Sig Dispense Refill     capecitabine (XELODA) 500 MG tablet CHEMO Take 3 tablets (1,500 mg) by mouth 2 times daily for 56 doses Take for 5 days per week Monday-Friday. Do NOT take on Saturday-Sunday. Take with water within 30 minutes after meal. 168 tablet 0      Acetaminophen (TYLENOL PO) Take 500 mg by mouth every 6 hours as needed Reported on 2/13/2017       LORazepam (ATIVAN) 0.5 MG tablet Take 1 tablet (0.5 mg) by mouth every 4 hours as needed (Anxiety, Nausea/Vomiting or Sleep) (Patient not taking: Reported on 3/15/2017) 30 tablet 2     prochlorperazine (COMPAZINE) 10 MG tablet Take 1 tablet (10 mg) by mouth every 6 hours as needed (Nausea/Vomiting) (Patient not taking: Reported on 3/15/2017) 30 tablet 2     [DISCONTINUED] capecitabine (XELODA) 500 MG tablet CHEMO Take 3 tablets (1,500 mg) by mouth 2 times daily for 7 days on, then 7 days off 84 tablet 0     [DISCONTINUED] capecitabine (XELODA) 500 MG tablet Take 1,000 mg/m2 by mouth 2 times daily Reported on 2/13/2017       ondansetron (ZOFRAN) 8 MG tablet Take 1 tablet (8 mg) by mouth every 8 hours as needed for nausea (Patient not taking: Reported on 3/15/2017) 30 tablet 1          ALLERGIES:       Allergies   Allergen Reactions     Nitrous Oxide Nausea and Vomiting           PHYSICAL EXAMINATION:   VITAL SIGNS:  /89  Pulse 72  Temp 97.5  F (36.4  C) (Oral)  Resp 18  Wt 61.3 kg (135 lb 1.6 oz)  SpO2 99%  BMI 23.93 kg/m2     GENERAL:  Alert and oriented x3, no apparent distress.   HEENT:  Moist mucous membrane.  On her face, she does have 2 dry patches. .   EXTREMITIES:  No pedal edema.   SKIN:  She does have ongoing matthew rash but no clear sites of skin break down   Neuro : CN2-12 intact     LABORATORY EVALUATION:  Reviewed in EMR and with the patient .      ASSESSMENT AND PLAN:  A patient with metastatic rectal cancer with most recent mixed response with mild progression of disease at the site of original tumor.  She started radiation yesterday.  She will be doing chemotherapy along with radiation.  We will be switching her to once a day every day Monday-Friday schedule b.i.d. dosing of Xeloda.  We went over the side effect profile again and she is quite comfortable with the drug.  We discussed that  typically when we do chemoradiation we have the patients come and see us on a weekly basis for symptom management, but since she is getting radiation at an outside facility she can call us with interim symptoms.  Otherwise, we will plan to see her every other week and she is agreeable to the plan.       Xochitl Christine   of Medicine   Hematology and medical Oncology   [facility]

## 2017-03-15 NOTE — MR AVS SNAPSHOT
After Visit Summary   3/15/2017    Fabienne Lynch    MRN: [id number]           Patient Information     Date Of Birth          1970        Visit Information        Provider Department      3/15/2017 7:45 AM Xochitl Christine MD [facility]        Today's Diagnoses     Rectal cancer (H)    -  1       Follow-ups after your visit        Follow-up notes from your care team     Return in about 2 weeks (around 3/29/2017).      Who to contact     If you have questions or need follow up information about today's clinic visit or your schedule please contact [facility] directly at [phone].  Normal or non-critical lab and imaging results will be communicated to you by MyChart, letter or phone within 4 business days after the clinic has received the results. If you do not hear from us within 7 days, please contact the clinic through [facility] or phone. If you have a critical or abnormal lab result, we will notify you by phone as soon as possible.  Submit refill requests through [employer] or call your pharmacy and they will forward the refill request to us. Please allow 3 business days for your refill to be completed.          Additional Information About Your Visit        MyChart Information     [employer] gives you secure access to your electronic health record. If you see a primary care provider, you can also send messages to your care team and make appointments. If you have questions, please call your primary care clinic.  If you do not have a primary care provider, please call [phone] and they will assist you.        Care EveryWhere ID     This is your Care EveryWhere ID. This could be used by other organizations to access your [place] medical records  [id number]        Your Vitals Were     Pulse Temperature Respirations Pulse Oximetry BMI (Body Mass Index)       72 97.5  F (36.4  C) (Oral) 18 99% 23.93 kg/m2        Blood Pressure from Last 3 Encounters:   03/15/17  122/89   02/16/17 137/90   02/13/17 118/84    Weight from Last 3 Encounters:   03/15/17 61.3 kg (135 lb 1.6 oz)   02/16/17 63.1 kg (139 lb 3.2 oz)   02/13/17 61.4 kg (135 lb 6.4 oz)              We Performed the Following     CBC with platelets differential     Comprehensive metabolic panel        Primary Care Provider Office Phone # Fax #    Golden Quinteros [phone]       [facility] [address]  [facility] [address]        Thank you!     Thank you for choosing [facility]  for your care. Our goal is always to provide you with excellent care. Hearing back from our patients is one way we can continue to improve our services. Please take a few minutes to complete the written survey that you may receive in the mail after your visit with us. Thank you!             Your Updated Medication List - Protect others around you: Learn how to safely use, store and throw away your medicines at www.disposemymeds.org.          This list is accurate as of: 3/15/17 11:59 PM.  Always use your most recent med list.                   Brand Name Dispense Instructions for use    capecitabine 500 MG tablet CHEMO    XELODA    168 tablet    Take 3 tablets (1,500 mg) by mouth 2 times daily for 56 doses Take for 5 days per week Monday-Friday. Do NOT take on Saturday-Sunday. Take with water within 30 minutes after meal.       LORazepam 0.5 MG tablet    ATIVAN    30 tablet    Take 1 tablet (0.5 mg) by mouth every 4 hours as needed (Anxiety, Nausea/Vomiting or Sleep)       ondansetron 8 MG tablet    ZOFRAN    30 tablet    Take 1 tablet (8 mg) by mouth every 8 hours as needed for nausea       prochlorperazine 10 MG tablet    COMPAZINE    30 tablet    Take 1 tablet (10 mg) by mouth every 6 hours as needed (Nausea/Vomiting)       TYLENOL PO      Take 500 mg by mouth every 6 hours as needed Reported on 2/13/2017

## 2017-03-15 NOTE — PROGRESS NOTES
HISTORY OF PRESENT ILLNESS:  This is a followup visit for a diagnosis of metastatic rectal carcinoma.  Kindly refer to my prior notes for details of the patient's history.  The patient was initially diagnosed with a symptomatic rectal mass and metastatic disease to her liver and probably lung.  She received chemotherapy with FOLFOX initially, transitioned to XELOX and most recently was getting Xeloda and Avastin for maintenance.  Unfortunately, on her last scan, she had progressive disease at the site of original cancer, but stable disease everywhere else.  Hence, we decided to pursue chemoradiation for the native tumor.  The patient had gone to spring break with her kids for vacation.  She returns and she is starting radiation now.  She states that yesterday was her first radiation treatment and she is tolerating it well.  Her last dose of Xeloda on a week on, week off schedule was last Friday.  She returns today to discuss chemotherapy.      REVIEW OF SYSTEMS:  On a 14-point review of systems, she is doing well.  She states that she can feel the tumor has grown as it is causing her some symptoms and rectal discomfort and occasional blood in her stool, but otherwise she feels good.  No fevers, chills, cough, chest pain, shortness of breath, abdominal pain or vision problems.  She does have some dry skin patches on her face that she was a little concerned about.      MEDICATIONS:   Current Outpatient Prescriptions   Medication Sig Dispense Refill     capecitabine (XELODA) 500 MG tablet CHEMO Take 3 tablets (1,500 mg) by mouth 2 times daily for 56 doses Take for 5 days per week Monday-Friday. Do NOT take on Saturday-Sunday. Take with water within 30 minutes after meal. 168 tablet 0     Acetaminophen (TYLENOL PO) Take 500 mg by mouth every 6 hours as needed Reported on 2/13/2017       LORazepam (ATIVAN) 0.5 MG tablet Take 1 tablet (0.5 mg) by mouth every 4 hours as needed (Anxiety, Nausea/Vomiting or Sleep) (Patient  not taking: Reported on 3/15/2017) 30 tablet 2     prochlorperazine (COMPAZINE) 10 MG tablet Take 1 tablet (10 mg) by mouth every 6 hours as needed (Nausea/Vomiting) (Patient not taking: Reported on 3/15/2017) 30 tablet 2     [DISCONTINUED] capecitabine (XELODA) 500 MG tablet CHEMO Take 3 tablets (1,500 mg) by mouth 2 times daily for 7 days on, then 7 days off 84 tablet 0     [DISCONTINUED] capecitabine (XELODA) 500 MG tablet Take 1,000 mg/m2 by mouth 2 times daily Reported on 2/13/2017       ondansetron (ZOFRAN) 8 MG tablet Take 1 tablet (8 mg) by mouth every 8 hours as needed for nausea (Patient not taking: Reported on 3/15/2017) 30 tablet 1          ALLERGIES:       Allergies   Allergen Reactions     Nitrous Oxide Nausea and Vomiting           PHYSICAL EXAMINATION:   VITAL SIGNS:  /89  Pulse 72  Temp 97.5  F (36.4  C) (Oral)  Resp 18  Wt 61.3 kg (135 lb 1.6 oz)  SpO2 99%  BMI 23.93 kg/m2     GENERAL:  Alert and oriented x3, no apparent distress.   HEENT:  Moist mucous membrane.  On her face, she does have 2 dry patches. .   EXTREMITIES:  No pedal edema.   SKIN:  She does have ongoing matthew rash but no clear sites of skin break down   Neuro : CN2-12 intact     LABORATORY EVALUATION:  Reviewed in EMR and with the patient .      ASSESSMENT AND PLAN:  A patient with metastatic rectal cancer with most recent mixed response with mild progression of disease at the site of original tumor.  She started radiation yesterday.  She will be doing chemotherapy along with radiation.  We will be switching her to once a day every day Monday-Friday schedule b.i.d. dosing of Xeloda.  We went over the side effect profile again and she is quite comfortable with the drug.  We discussed that typically when we do chemoradiation we have the patients come and see us on a weekly basis for symptom management, but since she is getting radiation at an outside facility she can call us with interim symptoms.  Otherwise, we will plan to  see her every other week and she is agreeable to the plan.       Xochitl Christine   of Medicine   Hematology and medical Oncology   [facility]

## 2017-03-15 NOTE — NURSING NOTE
"Fabienne Lynch is a 46 year old female who presents for:  Chief Complaint   Patient presents with     Port Draw     port accessed by RN, labs collected and sent, line flushed with NS and heparin-vitals taken and pt checked in for next appt.     Oncology Clinic Visit        Initial Vitals:  /89  Pulse 72  Temp 97.5  F (36.4  C) (Oral)  Resp 18  Wt 61.3 kg (135 lb 1.6 oz)  SpO2 99%  BMI 23.93 kg/m2 Estimated body mass index is 23.93 kg/(m^2) as calculated from the following:    Height as of 2/13/17: 1.6 m (5' 3\").    Weight as of this encounter: 61.3 kg (135 lb 1.6 oz).. Body surface area is 1.65 meters squared. BP completed using cuff size: regular  No Pain (0) No LMP recorded. Patient has had a hysterectomy. Allergies and medications reviewed.     Medications: Medication refills not needed today.  Pharmacy name entered into [employer]:    [id number] - [place], [place] - [address] [facility] [place] [facility] [address] [facility] [address] [place] [facility] [place], [place] - [address] [place] [address] PHARMACY - MAIL ORDER ONLY - [place], [place]    Comments:     6 minutes for nursing intake (face to face time)   Sumi Brian CMA        "

## 2017-03-15 NOTE — NURSING NOTE
Chief Complaint   Patient presents with     Port Draw     port accessed by RN, labs collected and sent, line flushed with NS and heparin-vitals taken and pt checked in for next appt.     Desire Hensley

## 2017-03-28 DIAGNOSIS — C20 RECTAL CANCER (H): Primary | ICD-10-CM

## 2017-03-28 ASSESSMENT — ENCOUNTER SYMPTOMS
WEIGHT LOSS: 0
NIGHT SWEATS: 0
POLYPHAGIA: 0
DECREASED APPETITE: 0
FEVER: 0
ALTERED TEMPERATURE REGULATION: 1
NAIL CHANGES: 0
WEIGHT GAIN: 0
FATIGUE: 1
CHILLS: 0
SKIN CHANGES: 0
HALLUCINATIONS: 0
POLYDIPSIA: 0
INCREASED ENERGY: 1

## 2017-03-29 ENCOUNTER — APPOINTMENT (OUTPATIENT)
Dept: LAB | Facility: CLINIC | Age: 47
End: 2017-03-29
Attending: INTERNAL MEDICINE
Payer: COMMERCIAL

## 2017-03-29 ENCOUNTER — ONCOLOGY VISIT (OUTPATIENT)
Dept: ONCOLOGY | Facility: CLINIC | Age: 47
End: 2017-03-29
Attending: PSYCHIATRY & NEUROLOGY
Payer: COMMERCIAL

## 2017-03-29 VITALS
RESPIRATION RATE: 18 BRPM | TEMPERATURE: 97.5 F | HEART RATE: 77 BPM | SYSTOLIC BLOOD PRESSURE: 112 MMHG | OXYGEN SATURATION: 100 % | WEIGHT: 133.4 LBS | BODY MASS INDEX: 23.64 KG/M2 | HEIGHT: 63 IN | DIASTOLIC BLOOD PRESSURE: 74 MMHG

## 2017-03-29 DIAGNOSIS — C20 RECTAL CANCER (H): ICD-10-CM

## 2017-03-29 LAB
ALBUMIN SERPL-MCNC: 3.9 G/DL (ref 3.4–5)
ALP SERPL-CCNC: 77 U/L (ref 40–150)
ALT SERPL W P-5'-P-CCNC: 33 U/L (ref 0–50)
ANION GAP SERPL CALCULATED.3IONS-SCNC: 6 MMOL/L (ref 3–14)
AST SERPL W P-5'-P-CCNC: 33 U/L (ref 0–45)
BASOPHILS # BLD AUTO: 0 10E9/L (ref 0–0.2)
BASOPHILS NFR BLD AUTO: 0.4 %
BILIRUB SERPL-MCNC: 2.5 MG/DL (ref 0.2–1.3)
BUN SERPL-MCNC: 16 MG/DL (ref 7–30)
CALCIUM SERPL-MCNC: 9.1 MG/DL (ref 8.5–10.1)
CHLORIDE SERPL-SCNC: 109 MMOL/L (ref 94–109)
CO2 SERPL-SCNC: 27 MMOL/L (ref 20–32)
CREAT SERPL-MCNC: 0.59 MG/DL (ref 0.52–1.04)
DIFFERENTIAL METHOD BLD: ABNORMAL
EOSINOPHIL # BLD AUTO: 0.1 10E9/L (ref 0–0.7)
EOSINOPHIL NFR BLD AUTO: 3.5 %
ERYTHROCYTE [DISTWIDTH] IN BLOOD BY AUTOMATED COUNT: 15.8 % (ref 10–15)
GFR SERPL CREATININE-BSD FRML MDRD: ABNORMAL ML/MIN/1.7M2
GLUCOSE SERPL-MCNC: 87 MG/DL (ref 70–99)
HCT VFR BLD AUTO: 34.8 % (ref 35–47)
HGB BLD-MCNC: 12.5 G/DL (ref 11.7–15.7)
IMM GRANULOCYTES # BLD: 0 10E9/L (ref 0–0.4)
IMM GRANULOCYTES NFR BLD: 0.4 %
LYMPHOCYTES # BLD AUTO: 0.5 10E9/L (ref 0.8–5.3)
LYMPHOCYTES NFR BLD AUTO: 15.8 %
MCH RBC QN AUTO: 34.7 PG (ref 26.5–33)
MCHC RBC AUTO-ENTMCNC: 35.9 G/DL (ref 31.5–36.5)
MCV RBC AUTO: 97 FL (ref 78–100)
MONOCYTES # BLD AUTO: 0.2 10E9/L (ref 0–1.3)
MONOCYTES NFR BLD AUTO: 7.4 %
NEUTROPHILS # BLD AUTO: 2.1 10E9/L (ref 1.6–8.3)
NEUTROPHILS NFR BLD AUTO: 72.5 %
NRBC # BLD AUTO: 0 10*3/UL
NRBC BLD AUTO-RTO: 0 /100
PLATELET # BLD AUTO: 63 10E9/L (ref 150–450)
POTASSIUM SERPL-SCNC: 4 MMOL/L (ref 3.4–5.3)
PROT SERPL-MCNC: 7.1 G/DL (ref 6.8–8.8)
RBC # BLD AUTO: 3.6 10E12/L (ref 3.8–5.2)
SODIUM SERPL-SCNC: 142 MMOL/L (ref 133–144)
WBC # BLD AUTO: 2.9 10E9/L (ref 4–11)

## 2017-03-29 PROCEDURE — 99212 OFFICE O/P EST SF 10 MIN: CPT | Mod: ZF

## 2017-03-29 PROCEDURE — 25000128 H RX IP 250 OP 636: Mod: ZF | Performed by: INTERNAL MEDICINE

## 2017-03-29 PROCEDURE — 80053 COMPREHEN METABOLIC PANEL: CPT | Performed by: INTERNAL MEDICINE

## 2017-03-29 PROCEDURE — 36591 DRAW BLOOD OFF VENOUS DEVICE: CPT

## 2017-03-29 PROCEDURE — 85025 COMPLETE CBC W/AUTO DIFF WBC: CPT | Performed by: INTERNAL MEDICINE

## 2017-03-29 PROCEDURE — 99214 OFFICE O/P EST MOD 30 MIN: CPT | Mod: ZP | Performed by: INTERNAL MEDICINE

## 2017-03-29 RX ORDER — HEPARIN SODIUM (PORCINE) LOCK FLUSH IV SOLN 100 UNIT/ML 100 UNIT/ML
5 SOLUTION INTRAVENOUS EVERY 8 HOURS
Status: DISCONTINUED | OUTPATIENT
Start: 2017-03-29 | End: 2017-04-04 | Stop reason: HOSPADM

## 2017-03-29 RX ADMIN — SODIUM CHLORIDE, PRESERVATIVE FREE 5 ML: 5 INJECTION INTRAVENOUS at 08:00

## 2017-03-29 ASSESSMENT — PAIN SCALES - GENERAL: PAINLEVEL: NO PAIN (0)

## 2017-03-29 NOTE — NURSING NOTE
"Fabienne Lynch is a 46 year old female who presents for:  Chief Complaint   Patient presents with     Blood Draw     Labs drawn through portacath and vital signs checked follow up for Rectal Cancer     Oncology Clinic Visit     return patient visit for follow up/side effects related to Rectal cancer (H)        Initial Vitals:  /74 (BP Location: Right arm, Patient Position: Chair, Cuff Size: Adult Regular)  Pulse 77  Temp 97.5  F (36.4  C) (Oral)  Resp 18  Ht 1.6 m (5' 3\")  Wt 60.5 kg (133 lb 6.4 oz)  SpO2 100%  BMI 23.63 kg/m2 Estimated body mass index is 23.63 kg/(m^2) as calculated from the following:    Height as of this encounter: 1.6 m (5' 3\").    Weight as of this encounter: 60.5 kg (133 lb 6.4 oz).. Body surface area is 1.64 meters squared. BP completed using cuff size: NA (Not Taken)  No Pain (0) No LMP recorded. Patient has had a hysterectomy. Allergies and medications reviewed.     Medications: Medication refills not needed today.  Pharmacy name entered into [employer]:    [id number] - [place], [place] - [address] [facility] SPECIALTY [place] - [place] - [address] [address] [place], [place] - [address] [place] [address]  [facility] - MAIL ORDER ONLY - [place], [place]    Comments: patient denied pain/discomfort.    5 minutes for nursing intake (face to face time)   Margoth Kovacs CMA        "

## 2017-03-29 NOTE — LETTER
3/29/2017       RE: Fabienne Lynch  [address] [facility] [address]     Dear Colleague,    Thank you for referring your patient, Fabienne Lynch, to the [facility]. Please see a copy of my visit note below.    HISTORY OF PRESENT ILLNESS:  This is a followup visit for a diagnosis of metastatic rectal carcinoma.  Kindly refer to my prior notes for details of the patient's history.  The patient was initially diagnosed with a symptomatic rectal mass and metastatic disease to her liver and probably lung.  She received chemotherapy with FOLFOX initially, transitioned to XELOX and most recently was getting Xeloda and Avastin for maintenance.  Unfortunately, on her last scan, she had progressive disease at the site of original cancer, but stable disease everywhere else.  Hence, we decided to pursue chemoradiation for the native tumor.  She is 2 weeks into chemoradiation.     REVIEW OF SYSTEMS:  On a 14-point review of systems, she is doing well.   Tolerating the chemorad well so far, some fatigue, had 2 episodes of diarrhea, no blood in stool currently.  No fevers, chills, cough, chest pain, shortness of breath, abdominal pain or vision problems.  She does have some dry skin patches on her face one of which has improved and almost resolved and the other is more itchy and irritated.     MEDICATIONS:   Current Outpatient Prescriptions   Medication Sig Dispense Refill     LORazepam (ATIVAN) 0.5 MG tablet Take 1 tablet (0.5 mg) by mouth every 4 hours as needed (Anxiety, Nausea/Vomiting or Sleep) 30 tablet 2     Acetaminophen (TYLENOL PO) Take 500 mg by mouth every 6 hours as needed Reported on 2/13/2017       prochlorperazine (COMPAZINE) 10 MG tablet Take 1 tablet (10 mg) by mouth every 6 hours as needed (Nausea/Vomiting) (Patient not taking: Reported on 3/15/2017) 30 tablet 2     capecitabine (XELODA) 500 MG tablet CHEMO Take 3 tablets (1,500 mg) by mouth 2 times daily for 56 doses Take for 5 days per  "week Monday-Friday. Do NOT take on Saturday-Sunday. Take with water within 30 minutes after meal. (Patient not taking: Reported on 3/29/2017) 168 tablet 0     ondansetron (ZOFRAN) 8 MG tablet Take 1 tablet (8 mg) by mouth every 8 hours as needed for nausea (Patient not taking: Reported on 3/15/2017) 30 tablet 1          ALLERGIES:       Allergies   Allergen Reactions     Nitrous Oxide Nausea and Vomiting           PHYSICAL EXAMINATION:   VITAL SIGNS:  /74 (BP Location: Right arm, Patient Position: Chair, Cuff Size: Adult Regular)  Pulse 77  Temp 97.5  F (36.4  C) (Oral)  Resp 18  Ht 1.6 m (5' 3\")  Wt 60.5 kg (133 lb 6.4 oz)  SpO2 100%  BMI 23.63 kg/m2     GENERAL:  Alert and oriented x3, no apparent distress.   HEENT:  Moist mucous membrane.  On her face, she does have 2 dry patches. .   EXTREMITIES:  No pedal edema.   SKIN:  She does have ongoing matthew rash but no clear sites of skin break down   Neuro : CN2-12 intact     LABORATORY EVALUATION:  Reviewed in EMR and with the patient .      ASSESSMENT AND PLAN:  A patient with metastatic rectal cancer with most recent mixed response with mild progression of disease at the site of original tumor.  She is doing chemoradiation with Xeloda.  She is tolerating the therapy well.  At this point, she states that she had 2 rounds of diarrhea, but since then she has been okay.  She does not feel dehydrated or nauseous.  She will continue the course.  her labs were remarkable for thrombocytopenia and leukopenia.  We will repeat her CBC next week to assess the need for adjustment of therapy.  Otherwise, she will return in 2 weeks' time.       Dry patch on her face- one is resolving and the other is inflamed - she was advised to apply low dose topical steroid on it. And if no improvement seek dermatology cares       Xochitl Christine   of Medicine   Hematology and medical Oncology   [facility]  March 29, 2017            "

## 2017-03-29 NOTE — LETTER
3/29/2017       RE: Fabienne Lynch  [address] [facility] [address]     Dear Colleague,    Thank you for referring your patient, Fabienne Lynch, to the [facility]. Please see a copy of my visit note below.    HISTORY OF PRESENT ILLNESS:  This is a followup visit for a diagnosis of metastatic rectal carcinoma.  Kindly refer to my prior notes for details of the patient's history.  The patient was initially diagnosed with a symptomatic rectal mass and metastatic disease to her liver and probably lung.  She received chemotherapy with FOLFOX initially, transitioned to XELOX and most recently was getting Xeloda and Avastin for maintenance.  Unfortunately, on her last scan, she had progressive disease at the site of original cancer, but stable disease everywhere else.  Hence, we decided to pursue chemoradiation for the native tumor.  She is 2 weeks into chemoradiation.     REVIEW OF SYSTEMS:  On a 14-point review of systems, she is doing well.   Tolerating the chemorad well so far, some fatigue, had 2 episodes of diarrhea, no blood in stool currently.  No fevers, chills, cough, chest pain, shortness of breath, abdominal pain or vision problems.  She does have some dry skin patches on her face one of which has improved and almost resolved and the other is more itchy and irritated.     MEDICATIONS:   Current Outpatient Prescriptions   Medication Sig Dispense Refill     LORazepam (ATIVAN) 0.5 MG tablet Take 1 tablet (0.5 mg) by mouth every 4 hours as needed (Anxiety, Nausea/Vomiting or Sleep) 30 tablet 2     Acetaminophen (TYLENOL PO) Take 500 mg by mouth every 6 hours as needed Reported on 2/13/2017       prochlorperazine (COMPAZINE) 10 MG tablet Take 1 tablet (10 mg) by mouth every 6 hours as needed (Nausea/Vomiting) (Patient not taking: Reported on 3/15/2017) 30 tablet 2     capecitabine (XELODA) 500 MG tablet CHEMO Take 3 tablets (1,500 mg) by mouth 2 times daily for 56 doses Take for 5 days per  "week Monday-Friday. Do NOT take on Saturday-Sunday. Take with water within 30 minutes after meal. (Patient not taking: Reported on 3/29/2017) 168 tablet 0     ondansetron (ZOFRAN) 8 MG tablet Take 1 tablet (8 mg) by mouth every 8 hours as needed for nausea (Patient not taking: Reported on 3/15/2017) 30 tablet 1          ALLERGIES:       Allergies   Allergen Reactions     Nitrous Oxide Nausea and Vomiting           PHYSICAL EXAMINATION:   VITAL SIGNS:  /74 (BP Location: Right arm, Patient Position: Chair, Cuff Size: Adult Regular)  Pulse 77  Temp 97.5  F (36.4  C) (Oral)  Resp 18  Ht 1.6 m (5' 3\")  Wt 60.5 kg (133 lb 6.4 oz)  SpO2 100%  BMI 23.63 kg/m2     GENERAL:  Alert and oriented x3, no apparent distress.   HEENT:  Moist mucous membrane.  On her face, she does have 2 dry patches. .   EXTREMITIES:  No pedal edema.   SKIN:  She does have ongoing matthew rash but no clear sites of skin break down   Neuro : CN2-12 intact     LABORATORY EVALUATION:  Reviewed in EMR and with the patient .      ASSESSMENT AND PLAN:  A patient with metastatic rectal cancer with most recent mixed response with mild progression of disease at the site of original tumor.  She is doing chemoradiation with Xeloda.  She is tolerating the therapy well.  At this point, she states that she had 2 rounds of diarrhea, but since then she has been okay.  She does not feel dehydrated or nauseous.  She will continue the course.  her labs were remarkable for thrombocytopenia and leukopenia.  We will repeat her CBC next week to assess the need for adjustment of therapy.  Otherwise, she will return in 2 weeks' time.       Dry patch on her face- one is resolving and the other is inflamed - she was advised to apply low dose topical steroid on it. And if no improvement seek dermatology cares       Xochitl Christine   of Medicine   Hematology and medical Oncology   [facility]  March 29, 2017            "

## 2017-03-29 NOTE — PROGRESS NOTES
HISTORY OF PRESENT ILLNESS:  This is a followup visit for a diagnosis of metastatic rectal carcinoma.  Kindly refer to my prior notes for details of the patient's history.  The patient was initially diagnosed with a symptomatic rectal mass and metastatic disease to her liver and probably lung.  She received chemotherapy with FOLFOX initially, transitioned to XELOX and most recently was getting Xeloda and Avastin for maintenance.  Unfortunately, on her last scan, she had progressive disease at the site of original cancer, but stable disease everywhere else.  Hence, we decided to pursue chemoradiation for the native tumor.  She is 2 weeks into chemoradiation.     REVIEW OF SYSTEMS:  On a 14-point review of systems, she is doing well.   Tolerating the chemorad well so far, some fatigue, had 2 episodes of diarrhea, no blood in stool currently.  No fevers, chills, cough, chest pain, shortness of breath, abdominal pain or vision problems.  She does have some dry skin patches on her face one of which has improved and almost resolved and the other is more itchy and irritated.     MEDICATIONS:   Current Outpatient Prescriptions   Medication Sig Dispense Refill     LORazepam (ATIVAN) 0.5 MG tablet Take 1 tablet (0.5 mg) by mouth every 4 hours as needed (Anxiety, Nausea/Vomiting or Sleep) 30 tablet 2     Acetaminophen (TYLENOL PO) Take 500 mg by mouth every 6 hours as needed Reported on 2/13/2017       prochlorperazine (COMPAZINE) 10 MG tablet Take 1 tablet (10 mg) by mouth every 6 hours as needed (Nausea/Vomiting) (Patient not taking: Reported on 3/15/2017) 30 tablet 2     capecitabine (XELODA) 500 MG tablet CHEMO Take 3 tablets (1,500 mg) by mouth 2 times daily for 56 doses Take for 5 days per week Monday-Friday. Do NOT take on Saturday-Sunday. Take with water within 30 minutes after meal. (Patient not taking: Reported on 3/29/2017) 168 tablet 0     ondansetron (ZOFRAN) 8 MG tablet Take 1 tablet (8 mg) by mouth every 8 hours  "as needed for nausea (Patient not taking: Reported on 3/15/2017) 30 tablet 1          ALLERGIES:       Allergies   Allergen Reactions     Nitrous Oxide Nausea and Vomiting           PHYSICAL EXAMINATION:   VITAL SIGNS:  /74 (BP Location: Right arm, Patient Position: Chair, Cuff Size: Adult Regular)  Pulse 77  Temp 97.5  F (36.4  C) (Oral)  Resp 18  Ht 1.6 m (5' 3\")  Wt 60.5 kg (133 lb 6.4 oz)  SpO2 100%  BMI 23.63 kg/m2     GENERAL:  Alert and oriented x3, no apparent distress.   HEENT:  Moist mucous membrane.  On her face, she does have 2 dry patches. .   EXTREMITIES:  No pedal edema.   SKIN:  She does have ongoing matthew rash but no clear sites of skin break down   Neuro : CN2-12 intact     LABORATORY EVALUATION:  Reviewed in EMR and with the patient .      ASSESSMENT AND PLAN:  A patient with metastatic rectal cancer with most recent mixed response with mild progression of disease at the site of original tumor.  She is doing chemoradiation with Xeloda.  She is tolerating the therapy well.  At this point, she states that she had 2 rounds of diarrhea, but since then she has been okay.  She does not feel dehydrated or nauseous.  She will continue the course.  her labs were remarkable for thrombocytopenia and leukopenia.  We will repeat her CBC next week to assess the need for adjustment of therapy.  Otherwise, she will return in 2 weeks' time.       Dry patch on her face- one is resolving and the other is inflamed - she was advised to apply low dose topical steroid on it. And if no improvement seek dermatology cares       Xochitl Christine   of Medicine   Hematology and medical Oncology   [facility]  March 29, 2017        Answers for HPI/ROS submitted by the patient on 3/28/2017   General Symptoms: Yes  Skin Symptoms: Yes  HENT Symptoms: No  EYE SYMPTOMS: No  HEART SYMPTOMS: No  LUNG SYMPTOMS: No  INTESTINAL SYMPTOMS: No  URINARY SYMPTOMS: No  GYNECOLOGIC SYMPTOMS: No  BREAST " SYMPTOMS: No  SKELETAL SYMPTOMS: No  BLOOD SYMPTOMS: No  NERVOUS SYSTEM SYMPTOMS: No  MENTAL HEALTH SYMPTOMS: No  Fever: No  Loss of appetite: No  Weight loss: No  Weight gain: No  Fatigue: Yes  Night sweats: No  Chills: No  Increased stress: No  Excessive hunger: No  Excessive thirst: No  Feeling hot or cold when others believe the temperature is normal: Yes  Loss of height: No  Post-operative complications: No  Surgical site pain: No  Hallucinations: No  Change in or Loss of Energy: Yes  Hyperactivity: No  Confusion: No  Changes in hair: No  Changes in moles/birth marks: No  Itching: Yes  Rashes: Yes  Changes in nails: No  Acne: No  Hair in places you don't want it: No  Change in facial hair: No  Warts: No  Scarring: No  Flaking of skin: Yes  Color changes of hands/feet in cold : Yes  Sun sensitivity: No  Skin thickening: No

## 2017-03-29 NOTE — MR AVS SNAPSHOT
After Visit Summary   3/29/2017    Fabienne Lynch    MRN: [id number]           Patient Information     Date Of Birth          1970        Visit Information        Provider Department      3/29/2017 8:15 AM Xochitl Christine MD [facility]        Today's Diagnoses     Rectal cancer (H)           Follow-ups after your visit        Follow-up notes from your care team     Return in about 2 weeks (around 4/12/2017) for Physical Exam, Lab Work.      Your next 10 appointments already scheduled     Apr 12, 2017  8:15 AM CDT   [facility] Draw with  [employer] LAB DRAW   [facility] Lab Draw ([facility])    [address] [place] [id number] [phone]            Apr 12, 2017  8:45 AM CDT   (Arrive by 8:30 AM)   Return Visit with Xochitl Christine MD   [facility] ([facility])    [address] [place] [id number] [phone]              Who to contact     If you have questions or need follow up information about today's clinic visit or your schedule please contact [facility] directly at [phone].  Normal or non-critical lab and imaging results will be communicated to you by MyChart, letter or phone within 4 business days after the clinic has received the results. If you do not hear from us within 7 days, please contact the clinic through [employer] or phone. If you have a critical or abnormal lab result, we will notify you by phone as soon as possible.  Submit refill requests through [employer] or call your pharmacy and they will forward the refill request to us. Please allow 3 business days for your refill to be completed.          Additional Information About Your Visit        MyChart Information     [employer] gives you secure access to your electronic health record. If you see a primary care provider, you can also send messages to your care team and  "make appointments. If you have questions, please call your primary care clinic.  If you do not have a primary care provider, please call [phone] and they will assist you.        Care EveryWhere ID     This is your Care EveryWhere ID. This could be used by other organizations to access your [facility] medical records  [id number]        Your Vitals Were     Pulse Temperature Respirations Height Pulse Oximetry BMI (Body Mass Index)    77 97.5  F (36.4  C) (Oral) 18 1.6 m (5' 3\") 100% 23.63 kg/m2       Blood Pressure from Last 3 Encounters:   03/29/17 112/74   03/15/17 122/89   02/16/17 137/90    Weight from Last 3 Encounters:   03/29/17 60.5 kg (133 lb 6.4 oz)   03/15/17 61.3 kg (135 lb 1.6 oz)   02/16/17 63.1 kg (139 lb 3.2 oz)              We Performed the Following     CBC with platelets differential     Comprehensive metabolic panel        Primary Care Provider Office Phone # Fax #    Golden Quinteros [phone]       [facility] [address] [facility] [address]        Thank you!     Thank you for choosing [facility]  for your care. Our goal is always to provide you with excellent care. Hearing back from our patients is one way we can continue to improve our services. Please take a few minutes to complete the written survey that you may receive in the mail after your visit with us. Thank you!             Your Updated Medication List - Protect others around you: Learn how to safely use, store and throw away your medicines at www.disposemymeds.org.          This list is accurate as of: 3/29/17 11:59 PM.  Always use your most recent med list.                   Brand Name Dispense Instructions for use    capecitabine 500 MG tablet CHEMO    XELODA    168 tablet    Take 3 tablets (1,500 mg) by mouth 2 times daily for 56 doses Take for 5 days per week Monday-Friday. Do NOT take on Saturday-Sunday. Take with water within 30 minutes after meal.       LORazepam 0.5 MG tablet    " ATIVAN    30 tablet    Take 1 tablet (0.5 mg) by mouth every 4 hours as needed (Anxiety, Nausea/Vomiting or Sleep)       ondansetron 8 MG tablet    ZOFRAN    30 tablet    Take 1 tablet (8 mg) by mouth every 8 hours as needed for nausea       prochlorperazine 10 MG tablet    COMPAZINE    30 tablet    Take 1 tablet (10 mg) by mouth every 6 hours as needed (Nausea/Vomiting)       TYLENOL PO      Take 500 mg by mouth every 6 hours as needed Reported on 2/13/2017

## 2017-03-29 NOTE — NURSING NOTE
Chief Complaint   Patient presents with     Blood Draw     Labs drawn through portacath and vital signs checked follow up for Rectal Cancer   Olivia Smith RN, OCN

## 2017-03-30 ENCOUNTER — CARE COORDINATION (OUTPATIENT)
Dept: ONCOLOGY | Facility: CLINIC | Age: 47
End: 2017-03-30

## 2017-03-30 NOTE — PROGRESS NOTES
At Dr. Christine's request called Fabienne to arrange for labs next week.    She will have them done at [facility] (same set up as Dec, 2016 prior to root canal).  Orders faxed on 3/30/17.  Fabienne will make appt for 4/5/17.    She is feeling fatigued and is worried about her blood work.  We reviewed that fatigue is a side effect of radiation, and that this is a new therapy program for her body and it might feel different from previous treatments.

## 2017-04-05 ENCOUNTER — TRANSFERRED RECORDS (OUTPATIENT)
Dept: HEALTH INFORMATION MANAGEMENT | Facility: CLINIC | Age: 47
End: 2017-04-05

## 2017-04-05 LAB
BASOPHILS # BLD AUTO: 0.01 10*3/UL
BASOPHILS NFR BLD AUTO: 0.3 %
EOSINOPHIL # BLD AUTO: 0.16 10*3/UL
EOSINOPHIL NFR BLD AUTO: 4.4 %
ERYTHROCYTE [DISTWIDTH] IN BLOOD BY AUTOMATED COUNT: 16.3 %
HCT VFR BLD AUTO: 36.2 %
HEMOGLOBIN: 12.8 G/DL (ref 11.7–15.7)
LYMPHOCYTES # BLD AUTO: 0.44 10*3/UL
LYMPHOCYTES NFR BLD AUTO: 12.2 %
MCH RBC QN AUTO: 34.7 PG
MCHC RBC AUTO-ENTMCNC: 35.4 G/DL
MCV RBC AUTO: 98 FL
MONOCYTES # BLD AUTO: 0.35 10*3/UL
MONOCYTES NFR BLD AUTO: 9.7 %
NEUTROPHILS # BLD AUTO: 2.64 10*3/UL
NEUTROPHILS NFR BLD AUTO: 73.4 %
PLATELET COUNT - QUEST: 72 10^9/L (ref 150–450)
RBC # BLD AUTO: 3.69 10^12/L
WBC # BLD AUTO: 3.6 10^9/L

## 2017-04-12 ENCOUNTER — APPOINTMENT (OUTPATIENT)
Dept: LAB | Facility: CLINIC | Age: 47
End: 2017-04-12
Attending: INTERNAL MEDICINE
Payer: COMMERCIAL

## 2017-04-12 ENCOUNTER — ONCOLOGY VISIT (OUTPATIENT)
Dept: ONCOLOGY | Facility: CLINIC | Age: 47
End: 2017-04-12
Attending: INTERNAL MEDICINE
Payer: COMMERCIAL

## 2017-04-12 VITALS
HEIGHT: 63 IN | WEIGHT: 131.9 LBS | HEART RATE: 68 BPM | OXYGEN SATURATION: 98 % | DIASTOLIC BLOOD PRESSURE: 72 MMHG | RESPIRATION RATE: 16 BRPM | SYSTOLIC BLOOD PRESSURE: 108 MMHG | BODY MASS INDEX: 23.37 KG/M2 | TEMPERATURE: 97.8 F

## 2017-04-12 DIAGNOSIS — C20 RECTAL CANCER (H): ICD-10-CM

## 2017-04-12 LAB
BASOPHILS # BLD AUTO: 0 10E9/L (ref 0–0.2)
BASOPHILS NFR BLD AUTO: 0.3 %
DIFFERENTIAL METHOD BLD: ABNORMAL
EOSINOPHIL # BLD AUTO: 0.1 10E9/L (ref 0–0.7)
EOSINOPHIL NFR BLD AUTO: 2.1 %
ERYTHROCYTE [DISTWIDTH] IN BLOOD BY AUTOMATED COUNT: 17 % (ref 10–15)
HCT VFR BLD AUTO: 34.5 % (ref 35–47)
HGB BLD-MCNC: 12.1 G/DL (ref 11.7–15.7)
IMM GRANULOCYTES # BLD: 0 10E9/L (ref 0–0.4)
IMM GRANULOCYTES NFR BLD: 0 %
LYMPHOCYTES # BLD AUTO: 0.4 10E9/L (ref 0.8–5.3)
LYMPHOCYTES NFR BLD AUTO: 9.5 %
MCH RBC QN AUTO: 34.6 PG (ref 26.5–33)
MCHC RBC AUTO-ENTMCNC: 35.1 G/DL (ref 31.5–36.5)
MCV RBC AUTO: 99 FL (ref 78–100)
MONOCYTES # BLD AUTO: 0.3 10E9/L (ref 0–1.3)
MONOCYTES NFR BLD AUTO: 7.6 %
NEUTROPHILS # BLD AUTO: 3.1 10E9/L (ref 1.6–8.3)
NEUTROPHILS NFR BLD AUTO: 80.5 %
NRBC # BLD AUTO: 0 10*3/UL
NRBC BLD AUTO-RTO: 0 /100
PLATELET # BLD AUTO: 86 10E9/L (ref 150–450)
RBC # BLD AUTO: 3.5 10E12/L (ref 3.8–5.2)
WBC # BLD AUTO: 3.8 10E9/L (ref 4–11)

## 2017-04-12 PROCEDURE — 99212 OFFICE O/P EST SF 10 MIN: CPT | Mod: ZF

## 2017-04-12 PROCEDURE — 25000128 H RX IP 250 OP 636: Mod: ZF | Performed by: INTERNAL MEDICINE

## 2017-04-12 PROCEDURE — 85025 COMPLETE CBC W/AUTO DIFF WBC: CPT | Performed by: INTERNAL MEDICINE

## 2017-04-12 PROCEDURE — 36591 DRAW BLOOD OFF VENOUS DEVICE: CPT

## 2017-04-12 PROCEDURE — 99214 OFFICE O/P EST MOD 30 MIN: CPT | Mod: ZP | Performed by: INTERNAL MEDICINE

## 2017-04-12 RX ORDER — HEPARIN SODIUM (PORCINE) LOCK FLUSH IV SOLN 100 UNIT/ML 100 UNIT/ML
5 SOLUTION INTRAVENOUS ONCE
Status: COMPLETED | OUTPATIENT
Start: 2017-04-12 | End: 2017-04-12

## 2017-04-12 RX ADMIN — SODIUM CHLORIDE, PRESERVATIVE FREE 5 ML: 5 INJECTION INTRAVENOUS at 08:20

## 2017-04-12 ASSESSMENT — PAIN SCALES - GENERAL: PAINLEVEL: NO PAIN (0)

## 2017-04-12 NOTE — LETTER
4/12/2017       RE: Fabienne Lynch  [address] [place] [address]     Dear Colleague,    Thank you for referring your patient, Fabienne Lynch, to the [facility]. Please see a copy of my visit note below.    [facility] Follow Up Visit    HISTORY OF PRESENT ILLNESS:    This is a followup visit for a diagnosis of metastatic rectal carcinoma.  Kindly refer to Dr. Christine's notes for details of the patient's history.      The patient was initially diagnosed with a symptomatic rectal mass and metastatic disease to her liver and probably lung.  She received chemotherapy with FOLFOX initially, transitioned to XELOX and then Xeloda and Avastin for maintenance.      Unfortunately, her can on 2/10/17 showed  progressive disease at the site of original cancer, but stable disease everywhere else.  Hence, we decided to pursue chemoradiation for the native tumor.  She is 4 weeks into chemoradiation.     REVIEW OF SYSTEMS:  On a 14-point review of systems, she is doing well.   Tolerating the chemorad well so far though feels that she is ready to be done. She has increasing fatigue. She is still working and able to get work around the house done.  She has increasing diarrhea but has not been using imodium.  She notes some bright red blood on toilet paper and notes that her anal region is irritated.  She denies any pain. Her hand/foot syndrome is stable, no cracked lesions.  She has residual neuropathy but feels it is slowly improving.  She denies any fevers or other infectious symptoms.  The rash on her face is improved without intervention.     MEDICATIONS:   Current Outpatient Prescriptions   Medication Sig Dispense Refill     LORazepam (ATIVAN) 0.5 MG tablet Take 1 tablet (0.5 mg) by mouth every 4 hours as needed (Anxiety, Nausea/Vomiting or Sleep) 30 tablet 2     prochlorperazine (COMPAZINE) 10 MG tablet Take 1 tablet (10 mg) by mouth every 6 hours as needed (Nausea/Vomiting) (Patient not  taking: Reported on 3/15/2017) 30 tablet 2     capecitabine (XELODA) 500 MG tablet CHEMO Take 3 tablets (1,500 mg) by mouth 2 times daily for 56 doses Take for 5 days per week Monday-Friday. Do NOT take on Saturday-Sunday. Take with water within 30 minutes after meal. (Patient not taking: Reported on 3/29/2017) 168 tablet 0     ondansetron (ZOFRAN) 8 MG tablet Take 1 tablet (8 mg) by mouth every 8 hours as needed for nausea (Patient not taking: Reported on 3/15/2017) 30 tablet 1     Acetaminophen (TYLENOL PO) Take 500 mg by mouth every 6 hours as needed Reported on 2/13/2017            ALLERGIES:       Allergies   Allergen Reactions     Nitrous Oxide Nausea and Vomiting           PHYSICAL EXAMINATION:   VITAL SIGNS:  /72  Pulse 68  Temp 97.8  F (36.6  C) (Oral)  Resp 16  Wt 59.8 kg (131 lb 14.4 oz)  SpO2 98%  BMI 23.37 kg/m2     GENERAL:  Alert and oriented x3, no apparent distress.   HEENT:  Moist mucous membrane.  On her face, she does have 2 dry patches -- though improved.   EXTREMITIES:  No pedal edema.   ABDOMEN - soft, non-tender, BS+  SKIN:  She does have ongoing matthew rash but no clear sites of skin break down   Neuro : CN2-12 intact     LABORATORY EVALUATION:  Reviewed in EMR and with the patient .        ASSESSMENT AND PLAN:  A patient with metastatic rectal cancer with most recent mixed response with mild progression of disease at the site of original tumor.  She is doing chemoradiation with Xeloda.  She is tolerating the therapy well.  She completed radiation next Monday.  I asked her to stop the Xeloda when this finishes.  We will have her come back the following week to discuss the next steps. At that time we will decide the timing of repeat imaging and when to start further treatment. I suggested that we would likely start her on FOLFIRI-avastin and reviewed this with her.      Dry patch on her face-  Resolving.  Monitor.    Patient was seen with and plan of care developed with   Nanci.    Cory Philip  Heme/Onc Fellow    Pt was discussed with the Resident/Fellow/Midlevel Provider and seen by me independently. Their note reflects our  joint evaluation and the highlights of the discussion for today are mentioned in this note:    Xochitl Christine   of Medicine   Hematology and medical Oncology   [facility]

## 2017-04-12 NOTE — MR AVS SNAPSHOT
After Visit Summary   4/12/2017    Fabienne Lynch    MRN: [id number]           Patient Information     Date Of Birth          1970        Visit Information        Provider Department      4/12/2017 8:45 AM Xochitl Christine MD [facility]        Today's Diagnoses     Rectal cancer (H)           Follow-ups after your visit        Follow-up notes from your care team     Return in about 2 weeks (around 4/26/2017) for Physical Exam, Lab Work.      Your next 10 appointments already scheduled     Apr 26, 2017  8:15 AM CDT   [facility] Draw with  [facility] DRAW   [facility] ([facility])    [address] [place] [id number] [phone]            Apr 26, 2017  8:45 AM CDT   (Arrive by 8:30 AM)   Return Visit with Xochitl Christine MD   [facility] ([facility])    [address] [place] [id number] [phone]              Future tests that were ordered for you today     Open Future Orders        Priority Expected Expires Ordered    *CBC with platelets differential Routine  4/12/2018 4/12/2017    Comprehensive metabolic panel Routine  4/12/2018 4/12/2017            Who to contact     If you have questions or need follow up information about today's clinic visit or your schedule please contact [facility] directly at [phone].  Normal or non-critical lab and imaging results will be communicated to you by MyChart, letter or phone within 4 business days after the clinic has received the results. If you do not hear from us within 7 days, please contact the clinic through MyChart or phone. If you have a critical or abnormal lab result, we will notify you by phone as soon as possible.  Submit refill requests through [employer] or call your pharmacy and they will forward the refill request to us. Please allow 3 business days for your refill  "to be completed.          Additional Information About Your Visit        [employer] Information     [employer] gives you secure access to your electronic health record. If you see a primary care provider, you can also send messages to your care team and make appointments. If you have questions, please call your primary care clinic.  If you do not have a primary care provider, please call [phone] and they will assist you.        Care EveryWhere ID     This is your Care EveryWhere ID. This could be used by other organizations to access your [facility] medical records  [id number]        Your Vitals Were     Pulse Temperature Respirations Height Pulse Oximetry BMI (Body Mass Index)    68 97.8  F (36.6  C) (Oral) 16 1.6 m (5' 2.99\") 98% 23.37 kg/m2       Blood Pressure from Last 3 Encounters:   04/12/17 108/72   03/29/17 112/74   03/15/17 122/89    Weight from Last 3 Encounters:   04/12/17 59.8 kg (131 lb 14.4 oz)   03/29/17 60.5 kg (133 lb 6.4 oz)   03/15/17 61.3 kg (135 lb 1.6 oz)              We Performed the Following     CBC with platelets differential        Primary Care Provider Office Phone # Fax #    Golden Quinteros [phone]       [address] [address]        Thank you!     Thank you for choosing [facility]  for your care. Our goal is always to provide you with excellent care. Hearing back from our patients is one way we can continue to improve our services. Please take a few minutes to complete the written survey that you may receive in the mail after your visit with us. Thank you!             Your Updated Medication List - Protect others around you: Learn how to safely use, store and throw away your medicines at www.disposemymeds.org.          This list is accurate as of: 4/12/17 11:59 PM.  Always use your most recent med list.                   Brand Name Dispense Instructions for use    capecitabine 500 MG tablet CHEMO    XELODA    168 " tablet    Take 3 tablets (1,500 mg) by mouth 2 times daily for 56 doses Take for 5 days per week Monday-Friday. Do NOT take on Saturday-Sunday. Take with water within 30 minutes after meal.       LORazepam 0.5 MG tablet    ATIVAN    30 tablet    Take 1 tablet (0.5 mg) by mouth every 4 hours as needed (Anxiety, Nausea/Vomiting or Sleep)       ondansetron 8 MG tablet    ZOFRAN    30 tablet    Take 1 tablet (8 mg) by mouth every 8 hours as needed for nausea       prochlorperazine 10 MG tablet    COMPAZINE    30 tablet    Take 1 tablet (10 mg) by mouth every 6 hours as needed (Nausea/Vomiting)       TYLENOL PO      Take 500 mg by mouth every 6 hours as needed Reported on 4/12/2017

## 2017-04-12 NOTE — PROGRESS NOTES
[facility] Follow Up Visit    HISTORY OF PRESENT ILLNESS:    This is a followup visit for a diagnosis of metastatic rectal carcinoma.  Kindly refer to Dr. Christine's notes for details of the patient's history.      The patient was initially diagnosed with a symptomatic rectal mass and metastatic disease to her liver and probably lung.  She received chemotherapy with FOLFOX initially, transitioned to XELOX and then Xeloda and Avastin for maintenance.      Unfortunately, her can on 2/10/17 showed  progressive disease at the site of original cancer, but stable disease everywhere else.  Hence, we decided to pursue chemoradiation for the native tumor.  She is 4 weeks into chemoradiation.     REVIEW OF SYSTEMS:  On a 14-point review of systems, she is doing well.   Tolerating the chemorad well so far though feels that she is ready to be done. She has increasing fatigue. She is still working and able to get work around the house done.  She has increasing diarrhea but has not been using imodium.  She notes some bright red blood on toilet paper and notes that her anal region is irritated.  She denies any pain. Her hand/foot syndrome is stable, no cracked lesions.  She has residual neuropathy but feels it is slowly improving.  She denies any fevers or other infectious symptoms.  The rash on her face is improved without intervention.     MEDICATIONS:   Current Outpatient Prescriptions   Medication Sig Dispense Refill     LORazepam (ATIVAN) 0.5 MG tablet Take 1 tablet (0.5 mg) by mouth every 4 hours as needed (Anxiety, Nausea/Vomiting or Sleep) 30 tablet 2     prochlorperazine (COMPAZINE) 10 MG tablet Take 1 tablet (10 mg) by mouth every 6 hours as needed (Nausea/Vomiting) (Patient not taking: Reported on 3/15/2017) 30 tablet 2     capecitabine (XELODA) 500 MG tablet CHEMO Take 3 tablets (1,500 mg) by mouth 2 times daily for 56 doses Take for 5 days per week Monday-Friday. Do NOT take on Saturday-Sunday. Take with water  within 30 minutes after meal. (Patient not taking: Reported on 3/29/2017) 168 tablet 0     ondansetron (ZOFRAN) 8 MG tablet Take 1 tablet (8 mg) by mouth every 8 hours as needed for nausea (Patient not taking: Reported on 3/15/2017) 30 tablet 1     Acetaminophen (TYLENOL PO) Take 500 mg by mouth every 6 hours as needed Reported on 2/13/2017            ALLERGIES:       Allergies   Allergen Reactions     Nitrous Oxide Nausea and Vomiting           PHYSICAL EXAMINATION:   VITAL SIGNS:  /72  Pulse 68  Temp 97.8  F (36.6  C) (Oral)  Resp 16  Wt 59.8 kg (131 lb 14.4 oz)  SpO2 98%  BMI 23.37 kg/m2     GENERAL:  Alert and oriented x3, no apparent distress.   HEENT:  Moist mucous membrane.  On her face, she does have 2 dry patches -- though improved.   EXTREMITIES:  No pedal edema.   ABDOMEN - soft, non-tender, BS+  SKIN:  She does have ongoing matthew rash but no clear sites of skin break down   Neuro : CN2-12 intact     LABORATORY EVALUATION:  Reviewed in EMR and with the patient .        ASSESSMENT AND PLAN:  A patient with metastatic rectal cancer with most recent mixed response with mild progression of disease at the site of original tumor.  She is doing chemoradiation with Xeloda.  She is tolerating the therapy well.  She completed radiation next Monday.  I asked her to stop the Xeloda when this finishes.  We will have her come back the following week to discuss the next steps. At that time we will decide the timing of repeat imaging and when to start further treatment. I suggested that we would likely start her on FOLFIRI-avastin and reviewed this with her.      Dry patch on her face-  Resolving.  Monitor.    Patient was seen with and plan of care developed with Dr. Christine.    Cory Quinones/Onc Fellow    Pt was discussed with the Resident/Fellow/Midlevel Provider and seen by me independently. Their note reflects our  joint evaluation and the highlights of the discussion for today are mentioned in this  note:    Xochitl Christine   of Medicine   Hematology and medical Oncology   [facility]

## 2017-04-12 NOTE — NURSING NOTE
"Fabienne Lynch is a 46 year old female who presents for:  Chief Complaint   Patient presents with     Port Draw     Labs collected via port by RN.     Oncology Clinic Visit     return patient visit for 2 week follow up related to rectal cancer         Initial Vitals:  /72  Pulse 68  Temp 97.8  F (36.6  C) (Oral)  Resp 16  Ht 1.6 m (5' 2.99\")  Wt 59.8 kg (131 lb 14.4 oz)  SpO2 98%  BMI 23.37 kg/m2 Estimated body mass index is 23.37 kg/(m^2) as calculated from the following:    Height as of this encounter: 1.6 m (5' 2.99\").    Weight as of this encounter: 59.8 kg (131 lb 14.4 oz).. Body surface area is 1.63 meters squared. BP completed using cuff size: NA (Not Taken)  No Pain (0) No LMP recorded. Patient has had a hysterectomy. Allergies and medications reviewed.     Medications: Medication refills not needed today.  Pharmacy name entered into [employer]:    [id number] - [place], [place] - [address] [facility] [place] - [address] [facility] [address] [place] [facility] - [place], [place] - [address] [place] [address]  [facility] MAIL ORDER ONLY - [place], [place]    Comments: patient denied pain/discomfort.     5 minutes for nursing intake (face to face time)   Margoth Kovacs CMA        "

## 2017-04-17 ENCOUNTER — TRANSFERRED RECORDS (OUTPATIENT)
Dept: HEALTH INFORMATION MANAGEMENT | Facility: CLINIC | Age: 47
End: 2017-04-17

## 2017-04-26 ENCOUNTER — ONCOLOGY VISIT (OUTPATIENT)
Dept: ONCOLOGY | Facility: CLINIC | Age: 47
End: 2017-04-26
Attending: INTERNAL MEDICINE
Payer: COMMERCIAL

## 2017-04-26 VITALS
DIASTOLIC BLOOD PRESSURE: 73 MMHG | SYSTOLIC BLOOD PRESSURE: 110 MMHG | OXYGEN SATURATION: 98 % | RESPIRATION RATE: 12 BRPM | BODY MASS INDEX: 22.43 KG/M2 | WEIGHT: 131.4 LBS | HEART RATE: 79 BPM | TEMPERATURE: 97.3 F | HEIGHT: 64 IN

## 2017-04-26 DIAGNOSIS — C20 RECTAL CANCER (H): ICD-10-CM

## 2017-04-26 DIAGNOSIS — C20 RECTAL CANCER (H): Primary | ICD-10-CM

## 2017-04-26 LAB
ALBUMIN SERPL-MCNC: 3.8 G/DL (ref 3.4–5)
ALP SERPL-CCNC: 106 U/L (ref 40–150)
ALT SERPL W P-5'-P-CCNC: 30 U/L (ref 0–50)
ANION GAP SERPL CALCULATED.3IONS-SCNC: 7 MMOL/L (ref 3–14)
AST SERPL W P-5'-P-CCNC: 31 U/L (ref 0–45)
BASOPHILS # BLD AUTO: 0 10E9/L (ref 0–0.2)
BASOPHILS NFR BLD AUTO: 0.5 %
BILIRUB SERPL-MCNC: 2.2 MG/DL (ref 0.2–1.3)
BUN SERPL-MCNC: 13 MG/DL (ref 7–30)
CALCIUM SERPL-MCNC: 9.3 MG/DL (ref 8.5–10.1)
CEA SERPL-MCNC: 0.7 UG/L (ref 0–2.5)
CHLORIDE SERPL-SCNC: 109 MMOL/L (ref 94–109)
CO2 SERPL-SCNC: 25 MMOL/L (ref 20–32)
CREAT SERPL-MCNC: 0.49 MG/DL (ref 0.52–1.04)
DIFFERENTIAL METHOD BLD: ABNORMAL
EOSINOPHIL # BLD AUTO: 0.1 10E9/L (ref 0–0.7)
EOSINOPHIL NFR BLD AUTO: 1.1 %
ERYTHROCYTE [DISTWIDTH] IN BLOOD BY AUTOMATED COUNT: 16.9 % (ref 10–15)
GFR SERPL CREATININE-BSD FRML MDRD: ABNORMAL ML/MIN/1.7M2
GLUCOSE SERPL-MCNC: 92 MG/DL (ref 70–99)
HCT VFR BLD AUTO: 35.1 % (ref 35–47)
HGB BLD-MCNC: 12.2 G/DL (ref 11.7–15.7)
IMM GRANULOCYTES # BLD: 0 10E9/L (ref 0–0.4)
IMM GRANULOCYTES NFR BLD: 0.5 %
LYMPHOCYTES # BLD AUTO: 0.5 10E9/L (ref 0.8–5.3)
LYMPHOCYTES NFR BLD AUTO: 10.8 %
MCH RBC QN AUTO: 34.7 PG (ref 26.5–33)
MCHC RBC AUTO-ENTMCNC: 34.8 G/DL (ref 31.5–36.5)
MCV RBC AUTO: 100 FL (ref 78–100)
MONOCYTES # BLD AUTO: 0.3 10E9/L (ref 0–1.3)
MONOCYTES NFR BLD AUTO: 6.9 %
NEUTROPHILS # BLD AUTO: 3.5 10E9/L (ref 1.6–8.3)
NEUTROPHILS NFR BLD AUTO: 80.2 %
NRBC # BLD AUTO: 0 10*3/UL
NRBC BLD AUTO-RTO: 1 /100
PLATELET # BLD AUTO: 93 10E9/L (ref 150–450)
POTASSIUM SERPL-SCNC: 3.8 MMOL/L (ref 3.4–5.3)
PROT SERPL-MCNC: 7.4 G/DL (ref 6.8–8.8)
RBC # BLD AUTO: 3.52 10E12/L (ref 3.8–5.2)
SODIUM SERPL-SCNC: 140 MMOL/L (ref 133–144)
WBC # BLD AUTO: 4.4 10E9/L (ref 4–11)

## 2017-04-26 PROCEDURE — 36591 DRAW BLOOD OFF VENOUS DEVICE: CPT

## 2017-04-26 PROCEDURE — 99212 OFFICE O/P EST SF 10 MIN: CPT | Mod: ZF

## 2017-04-26 PROCEDURE — 99213 OFFICE O/P EST LOW 20 MIN: CPT | Mod: ZP | Performed by: INTERNAL MEDICINE

## 2017-04-26 PROCEDURE — 82378 CARCINOEMBRYONIC ANTIGEN: CPT | Performed by: INTERNAL MEDICINE

## 2017-04-26 PROCEDURE — 80053 COMPREHEN METABOLIC PANEL: CPT | Performed by: INTERNAL MEDICINE

## 2017-04-26 PROCEDURE — 85025 COMPLETE CBC W/AUTO DIFF WBC: CPT | Performed by: INTERNAL MEDICINE

## 2017-04-26 ASSESSMENT — PAIN SCALES - GENERAL: PAINLEVEL: NO PAIN (0)

## 2017-04-26 NOTE — LETTER
4/26/2017       RE: Fabienne Lynch  [address] [facility] [address]     Dear Colleague,    Thank you for referring your patient, Fabienne Lynch, to the [facility]. Please see a copy of my visit note below.    [facility] Follow Up Visit    HISTORY OF PRESENT ILLNESS:    This is a followup visit for a diagnosis of metastatic rectal carcinoma.  Kindly refer to my previous  notes for details of the patient's history.      The patient was initially diagnosed with a symptomatic rectal mass and metastatic disease to her liver and probably lung.  She received chemotherapy with FOLFOX initially, transitioned to XELOX and then Xeloda and Avastin for maintenance.      Unfortunately, her can on 2/10/17 showed  progressive disease at the site of original cancer, but stable disease everywhere else.  Hence, we decided to pursue chemoradiation for the native tumor.  She completed chemorad with xeloda the week of 4/20.     REVIEW OF SYSTEMS:  On a 14-point review of systems, she is doing well.   She states that she has been feeling better and better with each passing day. She still is quite raw in the anal area and has periods of diarrhea and blood on toilet paper. She complains of throbbing pain in the area but no concerns for drainage. Her hand/foot syndrome is improving .  She has residual neuropathy but feels it is slowly improving.  She denies any fevers or other infectious symptoms.  The rash on her face is  Pretty much resolving.     MEDICATIONS:   Current Outpatient Prescriptions   Medication Sig Dispense Refill     ondansetron (ZOFRAN) 8 MG tablet Take 1 tablet (8 mg) by mouth every 8 hours as needed for nausea 30 tablet 1     Acetaminophen (TYLENOL PO) Take 500 mg by mouth every 6 hours as needed Reported on 4/12/2017       capecitabine (XELODA) 500 MG tablet CHEMO Take 3 tablets (1,500 mg) by mouth 2 times daily for 56 doses Take for 5 days per week Monday-Friday. Do NOT take on  "Saturday-Sunday. Take with water within 30 minutes after meal. (Patient not taking: Reported on 3/29/2017) 168 tablet 0          ALLERGIES:       Allergies   Allergen Reactions     Nitrous Oxide Nausea and Vomiting           PHYSICAL EXAMINATION:   VITAL SIGNS:  /73  Pulse 79  Temp 97.3  F (36.3  C) (Oral)  Resp 12  Ht 1.632 m (5' 4.25\")  Wt 59.6 kg (131 lb 6.4 oz)  SpO2 98%  BMI 22.38 kg/m2     GENERAL:  Alert and oriented x3, no apparent distress.   HEENT:  Moist mucous membrane. Dry patch resolved .   EXTREMITIES:  No pedal edema.   SKIN:  She does have ongoing matthew rash which is significantly improved, she has a few areas of hyperpigmentation   Neuro : CN2-12 intact     LABORATORY EVALUATION:  Pt will go to the lab after the visit and I will follow up     No interim imaging        ASSESSMENT AND PLAN:  A patient with metastatic rectal cancer with most recent mixed response with recent  mild progression of disease at the site of original tumor.  She finished  chemoradiation with Xeloda last week and is still recovering . I discused FOLFIRI-avastin and reviewed this with her.  She had questions about side effect profile that were discussed with her. She wants to take a break and will RTC in 3 weeks with a scan and labs and chemo appointment to see me.       I spent 35 minutes in the care of this patient >50% of which was spent in coordinating and counseling.  Xochitl Christine   of Medicine   Hematology and medical Oncology   [facility]  April 26, 2017          "

## 2017-04-26 NOTE — NURSING NOTE
Chief Complaint   Patient presents with     Port Draw     Labs collected via port by RN.      Port accessed, flushed with saline and heparin. Checked in for provider visit.  Landy Verma RN

## 2017-04-26 NOTE — PROGRESS NOTES
[facility] Follow Up Visit    HISTORY OF PRESENT ILLNESS:    This is a followup visit for a diagnosis of metastatic rectal carcinoma.  Kindly refer to my previous  notes for details of the patient's history.      The patient was initially diagnosed with a symptomatic rectal mass and metastatic disease to her liver and probably lung.  She received chemotherapy with FOLFOX initially, transitioned to XELOX and then Xeloda and Avastin for maintenance.      Unfortunately, her can on 2/10/17 showed  progressive disease at the site of original cancer, but stable disease everywhere else.  Hence, we decided to pursue chemoradiation for the native tumor.  She completed chemorad with xeloda the week of 4/20.     REVIEW OF SYSTEMS:  On a 14-point review of systems, she is doing well.   She states that she has been feeling better and better with each passing day. She still is quite raw in the anal area and has periods of diarrhea and blood on toilet paper. She complains of throbbing pain in the area but no concerns for drainage. Her hand/foot syndrome is improving .  She has residual neuropathy but feels it is slowly improving.  She denies any fevers or other infectious symptoms.  The rash on her face is  Pretty much resolving.     MEDICATIONS:   Current Outpatient Prescriptions   Medication Sig Dispense Refill     ondansetron (ZOFRAN) 8 MG tablet Take 1 tablet (8 mg) by mouth every 8 hours as needed for nausea 30 tablet 1     Acetaminophen (TYLENOL PO) Take 500 mg by mouth every 6 hours as needed Reported on 4/12/2017       capecitabine (XELODA) 500 MG tablet CHEMO Take 3 tablets (1,500 mg) by mouth 2 times daily for 56 doses Take for 5 days per week Monday-Friday. Do NOT take on Saturday-Sunday. Take with water within 30 minutes after meal. (Patient not taking: Reported on 3/29/2017) 168 tablet 0          ALLERGIES:       Allergies   Allergen Reactions     Nitrous Oxide Nausea and Vomiting           PHYSICAL  "EXAMINATION:   VITAL SIGNS:  /73  Pulse 79  Temp 97.3  F (36.3  C) (Oral)  Resp 12  Ht 1.632 m (5' 4.25\")  Wt 59.6 kg (131 lb 6.4 oz)  SpO2 98%  BMI 22.38 kg/m2     GENERAL:  Alert and oriented x3, no apparent distress.   HEENT:  Moist mucous membrane. Dry patch resolved .   EXTREMITIES:  No pedal edema.   SKIN:  She does have ongoing matthew rash which is significantly improved, she has a few areas of hyperpigmentation   Neuro : CN2-12 intact     LABORATORY EVALUATION:  Pt will go to the lab after the visit and I will follow up     No interim imaging        ASSESSMENT AND PLAN:  A patient with metastatic rectal cancer with most recent mixed response with recent  mild progression of disease at the site of original tumor.  She finished  chemoradiation with Xeloda last week and is still recovering . I discused FOLFIRI-avastin and reviewed this with her.  She had questions about side effect profile that were discussed with her. She wants to take a break and will RTC in 3 weeks with a scan and labs and chemo appointment to see me.       I spent 35 minutes in the care of this patient >50% of which was spent in coordinating and counseling.  Xochitl Christine   of Medicine   Hematology and medical Oncology   [facility]  April 26, 2017      "

## 2017-04-26 NOTE — LETTER
4/26/2017      RE: Fabienne Lynch  [address] [facility] [id number] [place] [address] Cancer Clinic Follow Up Visit    HISTORY OF PRESENT ILLNESS:    This is a followup visit for a diagnosis of metastatic rectal carcinoma.  Kindly refer to my previous  notes for details of the patient's history.      The patient was initially diagnosed with a symptomatic rectal mass and metastatic disease to her liver and probably lung.  She received chemotherapy with FOLFOX initially, transitioned to XELOX and then Xeloda and Avastin for maintenance.      Unfortunately, her can on 2/10/17 showed  progressive disease at the site of original cancer, but stable disease everywhere else.  Hence, we decided to pursue chemoradiation for the native tumor.  She completed chemorad with xeloda the week of 4/20.     REVIEW OF SYSTEMS:  On a 14-point review of systems, she is doing well.   She states that she has been feeling better and better with each passing day. She still is quite raw in the anal area and has periods of diarrhea and blood on toilet paper. She complains of throbbing pain in the area but no concerns for drainage. Her hand/foot syndrome is improving .  She has residual neuropathy but feels it is slowly improving.  She denies any fevers or other infectious symptoms.  The rash on her face is  Pretty much resolving.     MEDICATIONS:   Current Outpatient Prescriptions   Medication Sig Dispense Refill     ondansetron (ZOFRAN) 8 MG tablet Take 1 tablet (8 mg) by mouth every 8 hours as needed for nausea 30 tablet 1     Acetaminophen (TYLENOL PO) Take 500 mg by mouth every 6 hours as needed Reported on 4/12/2017       capecitabine (XELODA) 500 MG tablet CHEMO Take 3 tablets (1,500 mg) by mouth 2 times daily for 56 doses Take for 5 days per week Monday-Friday. Do NOT take on Saturday-Sunday. Take with water within 30 minutes after meal. (Patient not taking: Reported on 3/29/2017) 168 tablet 0          ALLERGIES:       Allergies  "  Allergen Reactions     Nitrous Oxide Nausea and Vomiting           PHYSICAL EXAMINATION:   VITAL SIGNS:  /73  Pulse 79  Temp 97.3  F (36.3  C) (Oral)  Resp 12  Ht 1.632 m (5' 4.25\")  Wt 59.6 kg (131 lb 6.4 oz)  SpO2 98%  BMI 22.38 kg/m2     GENERAL:  Alert and oriented x3, no apparent distress.   HEENT:  Moist mucous membrane. Dry patch resolved .   EXTREMITIES:  No pedal edema.   SKIN:  She does have ongoing matthew rash which is significantly improved, she has a few areas of hyperpigmentation   Neuro : CN2-12 intact     LABORATORY EVALUATION:  Pt will go to the lab after the visit and I will follow up     No interim imaging        ASSESSMENT AND PLAN:  A patient with metastatic rectal cancer with most recent mixed response with recent  mild progression of disease at the site of original tumor.  She finished  chemoradiation with Xeloda last week and is still recovering . I discused FOLFIRI-avastin and reviewed this with her.  She had questions about side effect profile that were discussed with her. She wants to take a break and will RTC in 3 weeks with a scan and labs and chemo appointment to see me.       I spent 35 minutes in the care of this patient >50% of which was spent in coordinating and counseling.  Xochitl Christine   of Medicine   Hematology and medical Oncology   [facility]  April 26, 2017        "

## 2017-04-26 NOTE — NURSING NOTE
Met with Fabienne and her  Jeff, to discuss chemotherapy change to Folfiri and Avastin.  Provided patient [employer] printouts.  Reviewed administration, side effects and ongoing symptom support management by APPs in clinic. Provided phone numbers for triage and after hours care. Fabienne and Jeff stated understanding.

## 2017-04-26 NOTE — NURSING NOTE
"Oncology Rooming Note    April 26, 2017 9:04 AM   Fabienne Lynch is a 52 year old female who presents for: Oncology Clinic Visit    Initial Vitals: /73  Pulse 79  Temp 97.3  F (36.3  C) (Oral)  Resp 12  Ht 1.632 m (5' 4.25\")  Wt 59.6 kg (131 lb 6.4 oz)  SpO2 98%  BMI 22.38 kg/m2 Estimated body mass index is 22.38 kg/(m^2) as calculated from the following:    Height as of this encounter: 1.632 m (5' 4.25\").    Weight as of this encounter: 59.6 kg (131 lb 6.4 oz). Body surface area is 1.64 meters squared.  No Pain (0) Comment: Data Unavailable   No LMP recorded. Patient has had a hysterectomy.  Allergies reviewed: Yes  Medications reviewed: Yes    Medications: Medication refills not needed today.  Pharmacy name entered into [employer]:    [id number] - [place], [place] - [address] [facility] [address] [facility] - [address] [id number] [place], [place] - [address] [place] [address]  [facility] - MAIL ORDER ONLY - [place], [place]    Clinical concerns:no n/a was NOT notified.    5 minutes for nursing intake (face to face time)     Eusebio Mcgee CMA                            "

## 2017-04-26 NOTE — MR AVS SNAPSHOT
After Visit Summary   4/26/2017    Fabienne Lynch    MRN: [id number]           Patient Information     Date Of Birth          1970        Visit Information        Provider Department      4/26/2017 8:45 AM Xochitl Christine MD [facility]        Today's Diagnoses     Rectal cancer (H)    -  1       Follow-ups after your visit        Follow-up notes from your care team     Return in about 3 weeks (around 5/17/2017) for Physical Exam.      Your next 10 appointments already scheduled     May 17, 2017  8:20 AM CDT   CT CHEST ABDOMEN PELVIS W/O & W CONTRAST with UUCT1   [facility], [place], [place] ([facility], [facility])    [address] [address] [phone]           Please bring any scans or X-rays taken at other hospitals, if similar tests were done. Also bring a list of your medicines, including vitamins, minerals and over-the-counter drugs. It is safest to leave personal items at home.  Be sure to tell your doctor:   If you have any allergies.   If there s any chance you are pregnant.   If you are breastfeeding.   If you have any special needs.  You may have contrast for this exam. To prepare:   Do not eat or drink for 2 hours before your exam. If you need to take medicine, you may take it with small sips of water. (We may ask you to take liquid medicine as well.)   The day before your exam, drink extra fluids at least six 8-ounce glasses (unless your doctor tells you to restrict your fluids).  Patients over 70 or patients with diabetes or kidney problems:   If you haven t had a blood test (creatinine test) within the last 30 days, go to your clinic or Diagnostic Imaging Department for this test.  If you have diabetes:   If your kidney function is normal, continue taking your metformin (Avandamet, Glucophage, Glucovance, Metaglip) on the day of your exam.   If your kidney function is abnormal, wait 48 hours before restarting this  medicine.  You will have oral contrast for this exam:   You will drink the contrast at home. Get this from your clinic or Diagnostic Imaging Department. Please follow the directions given.  Please wear loose clothing, such as a sweat suit or jogging clothes. Avoid snaps, zippers and other metal. We may ask you to undress and put on a hospital gown.  If you have any questions, please call the Imaging Department where you will have your exam.            May 17, 2017 10:45 AM CDT   [facility] Lab Draw with [facility] LAB DRAW   [facility] Lab Draw ([facility])    [address] [place] [id number] [phone]            May 17, 2017 11:15 AM CDT   (Arrive by 11:00 AM)   Return Visit with Xochitl Christine MD   [facility] ([facility])    [address] [place] [id number] [phone]            May 17, 2017 12:30 PM CDT   Infusion 180 with  ONCOLOGY INFUSION, UC 30 ATC   [facility] ([facility])    [address] [place] [id number] [phone]              Future tests that were ordered for you today     Open Future Orders        Priority Expected Expires Ordered    Comprehensive metabolic panel Routine 5/17/2017 4/26/2018 4/26/2017    CEA Routine 5/17/2017 4/26/2018 4/26/2017    CBC with platelets differential Routine 5/17/2017 4/26/2018 4/26/2017    CT Chest Abdomen Pelvis w/o & w Contrast Routine 5/17/2017 4/26/2018 4/26/2017    CEA Routine  4/26/2018 4/26/2017            Who to contact     If you have questions or need follow up information about today's clinic visit or your schedule please contact [facility] directly at [phone].  Normal or non-critical lab and imaging results will be communicated to you by MyChart, letter or phone within 4 business days after the clinic has received the results. If  "you do not hear from us within 7 days, please contact the clinic through [employer] or phone. If you have a critical or abnormal lab result, we will notify you by phone as soon as possible.  Submit refill requests through [employer] or call your pharmacy and they will forward the refill request to us. Please allow 3 business days for your refill to be completed.          Additional Information About Your Visit        [email or web address] Information     [employer] gives you secure access to your electronic health record. If you see a primary care provider, you can also send messages to your care team and make appointments. If you have questions, please call your primary care clinic.  If you do not have a primary care provider, please call [phone] and they will assist you.        Care EveryWhere ID     This is your Care EveryWhere ID. This could be used by other organizations to access your [place] medical records  [id number]        Your Vitals Were     Pulse Temperature Respirations Height Pulse Oximetry BMI (Body Mass Index)    79 97.3  F (36.3  C) (Oral) 12 1.632 m (5' 4.25\") 98% 22.38 kg/m2       Blood Pressure from Last 3 Encounters:   04/26/17 110/73   04/12/17 108/72   03/29/17 112/74    Weight from Last 3 Encounters:   04/26/17 59.6 kg (131 lb 6.4 oz)   04/12/17 59.8 kg (131 lb 14.4 oz)   03/29/17 60.5 kg (133 lb 6.4 oz)                 Today's Medication Changes          These changes are accurate as of: 4/26/17 10:08 AM.  If you have any questions, ask your nurse or doctor.               Stop taking these medicines if you haven't already. Please contact your care team if you have questions.     LORazepam 0.5 MG tablet   Commonly known as:  ATIVAN   Stopped by:  Xochitl Christine MD           prochlorperazine 10 MG tablet   Commonly known as:  COMPAZINE   Stopped by:  Xochitl Christine MD                    Primary Care Provider Office Phone # Fax #    Golden Quinteros [phone]       [facility] [address]  [place] [address]        Thank you!     Thank you for choosing [facility]  for your care. Our goal is always to provide you with excellent care. Hearing back from our patients is one way we can continue to improve our services. Please take a few minutes to complete the written survey that you may receive in the mail after your visit with us. Thank you!             Your Updated Medication List - Protect others around you: Learn how to safely use, store and throw away your medicines at www.disposemymeds.org.          This list is accurate as of: 4/26/17 10:08 AM.  Always use your most recent med list.                   Brand Name Dispense Instructions for use    capecitabine 500 MG tablet CHEMO    XELODA    168 tablet    Take 3 tablets (1,500 mg) by mouth 2 times daily for 56 doses Take for 5 days per week Monday-Friday. Do NOT take on Saturday-Sunday. Take with water within 30 minutes after meal.       ondansetron 8 MG tablet    ZOFRAN    30 tablet    Take 1 tablet (8 mg) by mouth every 8 hours as needed for nausea       TYLENOL PO      Take 500 mg by mouth every 6 hours as needed Reported on 4/12/2017

## 2017-05-16 RX ORDER — LORAZEPAM 0.5 MG/1
0.5 TABLET ORAL EVERY 4 HOURS PRN
Qty: 30 TABLET | Refills: 2 | Status: SHIPPED | OUTPATIENT
Start: 2017-05-16 | End: 2017-07-25

## 2017-05-16 RX ORDER — ONDANSETRON 8 MG/1
8 TABLET, FILM COATED ORAL EVERY 8 HOURS PRN
Qty: 10 TABLET | Refills: 2 | Status: SHIPPED | OUTPATIENT
Start: 2017-05-16 | End: 2017-07-25

## 2017-05-16 RX ORDER — PROCHLORPERAZINE MALEATE 10 MG
10 TABLET ORAL EVERY 6 HOURS PRN
Qty: 30 TABLET | Refills: 2 | Status: SHIPPED | OUTPATIENT
Start: 2017-05-16 | End: 2017-07-25

## 2017-05-16 RX ORDER — LOPERAMIDE HCL 2 MG
CAPSULE ORAL
Qty: 30 CAPSULE | Refills: 0 | Status: SHIPPED | OUTPATIENT
Start: 2017-05-16 | End: 2017-07-25

## 2017-05-17 ENCOUNTER — APPOINTMENT (OUTPATIENT)
Dept: LAB | Facility: CLINIC | Age: 47
End: 2017-05-17
Attending: INTERNAL MEDICINE
Payer: COMMERCIAL

## 2017-05-17 ENCOUNTER — ONCOLOGY VISIT (OUTPATIENT)
Dept: ONCOLOGY | Facility: CLINIC | Age: 47
End: 2017-05-17
Attending: INTERNAL MEDICINE
Payer: COMMERCIAL

## 2017-05-17 ENCOUNTER — HOSPITAL ENCOUNTER (OUTPATIENT)
Dept: CT IMAGING | Facility: CLINIC | Age: 47
Discharge: HOME | End: 2017-05-17
Attending: INTERNAL MEDICINE | Admitting: INTERNAL MEDICINE
Payer: COMMERCIAL

## 2017-05-17 VITALS
HEART RATE: 65 BPM | OXYGEN SATURATION: 99 % | DIASTOLIC BLOOD PRESSURE: 72 MMHG | RESPIRATION RATE: 18 BRPM | WEIGHT: 129 LBS | SYSTOLIC BLOOD PRESSURE: 103 MMHG | TEMPERATURE: 98 F | BODY MASS INDEX: 21.97 KG/M2

## 2017-05-17 DIAGNOSIS — C20 RECTAL CANCER (H): ICD-10-CM

## 2017-05-17 DIAGNOSIS — C20 RECTAL CANCER (H): Primary | ICD-10-CM

## 2017-05-17 LAB
ALBUMIN SERPL-MCNC: 3.8 G/DL (ref 3.4–5)
ALBUMIN UR-MCNC: NEGATIVE MG/DL
ALP SERPL-CCNC: 84 U/L (ref 40–150)
ALT SERPL W P-5'-P-CCNC: 32 U/L (ref 0–50)
ANION GAP SERPL CALCULATED.3IONS-SCNC: 6 MMOL/L (ref 3–14)
AST SERPL W P-5'-P-CCNC: 33 U/L (ref 0–45)
BASOPHILS # BLD AUTO: 0 10E9/L (ref 0–0.2)
BASOPHILS NFR BLD AUTO: 0.3 %
BILIRUB SERPL-MCNC: 1.9 MG/DL (ref 0.2–1.3)
BUN SERPL-MCNC: 18 MG/DL (ref 7–30)
CALCIUM SERPL-MCNC: 9.4 MG/DL (ref 8.5–10.1)
CEA SERPL-MCNC: 0.6 UG/L (ref 0–2.5)
CHLORIDE SERPL-SCNC: 109 MMOL/L (ref 94–109)
CO2 SERPL-SCNC: 26 MMOL/L (ref 20–32)
CREAT SERPL-MCNC: 0.54 MG/DL (ref 0.52–1.04)
DIFFERENTIAL METHOD BLD: ABNORMAL
EOSINOPHIL # BLD AUTO: 0.1 10E9/L (ref 0–0.7)
EOSINOPHIL NFR BLD AUTO: 3 %
ERYTHROCYTE [DISTWIDTH] IN BLOOD BY AUTOMATED COUNT: 14.3 % (ref 10–15)
GFR SERPL CREATININE-BSD FRML MDRD: ABNORMAL ML/MIN/1.7M2
GLUCOSE SERPL-MCNC: 64 MG/DL (ref 70–99)
HCT VFR BLD AUTO: 36.1 % (ref 35–47)
HGB BLD-MCNC: 12.5 G/DL (ref 11.7–15.7)
IMM GRANULOCYTES # BLD: 0 10E9/L (ref 0–0.4)
IMM GRANULOCYTES NFR BLD: 0.3 %
LYMPHOCYTES # BLD AUTO: 0.6 10E9/L (ref 0.8–5.3)
LYMPHOCYTES NFR BLD AUTO: 13.9 %
MCH RBC QN AUTO: 34.2 PG (ref 26.5–33)
MCHC RBC AUTO-ENTMCNC: 34.6 G/DL (ref 31.5–36.5)
MCV RBC AUTO: 99 FL (ref 78–100)
MONOCYTES # BLD AUTO: 0.3 10E9/L (ref 0–1.3)
MONOCYTES NFR BLD AUTO: 7.3 %
NEUTROPHILS # BLD AUTO: 3 10E9/L (ref 1.6–8.3)
NEUTROPHILS NFR BLD AUTO: 75.2 %
NRBC # BLD AUTO: 0 10*3/UL
NRBC BLD AUTO-RTO: 0 /100
PLATELET # BLD AUTO: 110 10E9/L (ref 150–450)
POTASSIUM SERPL-SCNC: 4.1 MMOL/L (ref 3.4–5.3)
PROT SERPL-MCNC: 7.2 G/DL (ref 6.8–8.8)
RBC # BLD AUTO: 3.66 10E12/L (ref 3.8–5.2)
SODIUM SERPL-SCNC: 142 MMOL/L (ref 133–144)
WBC # BLD AUTO: 4 10E9/L (ref 4–11)

## 2017-05-17 PROCEDURE — 25000125 ZZHC RX 250: Performed by: RADIOLOGY

## 2017-05-17 PROCEDURE — 74177 CT ABD & PELVIS W/CONTRAST: CPT

## 2017-05-17 PROCEDURE — 96368 THER/DIAG CONCURRENT INF: CPT

## 2017-05-17 PROCEDURE — 99214 OFFICE O/P EST MOD 30 MIN: CPT | Mod: ZP | Performed by: INTERNAL MEDICINE

## 2017-05-17 PROCEDURE — 96413 CHEMO IV INFUSION 1 HR: CPT

## 2017-05-17 PROCEDURE — 85025 COMPLETE CBC W/AUTO DIFF WBC: CPT | Performed by: INTERNAL MEDICINE

## 2017-05-17 PROCEDURE — 80053 COMPREHEN METABOLIC PANEL: CPT | Performed by: INTERNAL MEDICINE

## 2017-05-17 PROCEDURE — 25000128 H RX IP 250 OP 636: Mod: ZF | Performed by: INTERNAL MEDICINE

## 2017-05-17 PROCEDURE — 82378 CARCINOEMBRYONIC ANTIGEN: CPT | Performed by: INTERNAL MEDICINE

## 2017-05-17 PROCEDURE — 96411 CHEMO IV PUSH ADDL DRUG: CPT

## 2017-05-17 PROCEDURE — 25000125 ZZHC RX 250: Mod: ZF | Performed by: INTERNAL MEDICINE

## 2017-05-17 PROCEDURE — 81003 URINALYSIS AUTO W/O SCOPE: CPT | Performed by: INTERNAL MEDICINE

## 2017-05-17 PROCEDURE — 40000268 ZZH STATISTIC NO CHARGES: Mod: ZF

## 2017-05-17 PROCEDURE — 71260 CT THORAX DX C+: CPT

## 2017-05-17 PROCEDURE — 25000128 H RX IP 250 OP 636: Performed by: RADIOLOGY

## 2017-05-17 PROCEDURE — 96416 CHEMO PROLONG INFUSE W/PUMP: CPT

## 2017-05-17 PROCEDURE — 96417 CHEMO IV INFUS EACH ADDL SEQ: CPT

## 2017-05-17 PROCEDURE — S0028 INJECTION, FAMOTIDINE, 20 MG: HCPCS | Mod: ZF | Performed by: INTERNAL MEDICINE

## 2017-05-17 PROCEDURE — 96375 TX/PRO/DX INJ NEW DRUG ADDON: CPT | Mod: 59

## 2017-05-17 RX ORDER — FLUOROURACIL 50 MG/ML
400 INJECTION, SOLUTION INTRAVENOUS ONCE
Status: COMPLETED | OUTPATIENT
Start: 2017-05-17 | End: 2017-05-17

## 2017-05-17 RX ORDER — METHYLPREDNISOLONE SODIUM SUCCINATE 125 MG/2ML
125 INJECTION, POWDER, LYOPHILIZED, FOR SOLUTION INTRAMUSCULAR; INTRAVENOUS
Status: CANCELLED
Start: 2017-05-17

## 2017-05-17 RX ORDER — FLUOROURACIL 50 MG/ML
400 INJECTION, SOLUTION INTRAVENOUS ONCE
Status: CANCELLED | OUTPATIENT
Start: 2017-05-17

## 2017-05-17 RX ORDER — EPINEPHRINE 0.3 MG/.3ML
INJECTION SUBCUTANEOUS
Status: DISCONTINUED
Start: 2017-05-17 | End: 2017-05-17 | Stop reason: WASHOUT

## 2017-05-17 RX ORDER — LORAZEPAM 2 MG/ML
0.5 INJECTION INTRAMUSCULAR EVERY 4 HOURS PRN
Status: CANCELLED
Start: 2017-05-17

## 2017-05-17 RX ORDER — HEPARIN SODIUM,PORCINE 10 UNIT/ML
5 VIAL (ML) INTRAVENOUS ONCE
Status: COMPLETED | OUTPATIENT
Start: 2017-05-17 | End: 2017-05-17

## 2017-05-17 RX ORDER — HEPARIN SODIUM (PORCINE) LOCK FLUSH IV SOLN 100 UNIT/ML 100 UNIT/ML
5 SOLUTION INTRAVENOUS DAILY PRN
Status: DISCONTINUED | OUTPATIENT
Start: 2017-05-17 | End: 2017-05-26 | Stop reason: HOSPADM

## 2017-05-17 RX ORDER — ALBUTEROL SULFATE 90 UG/1
1-2 AEROSOL, METERED RESPIRATORY (INHALATION)
Status: CANCELLED
Start: 2017-05-17

## 2017-05-17 RX ORDER — SODIUM CHLORIDE 9 MG/ML
1000 INJECTION, SOLUTION INTRAVENOUS CONTINUOUS PRN
Status: CANCELLED
Start: 2017-05-17

## 2017-05-17 RX ORDER — ALBUTEROL SULFATE 0.83 MG/ML
2.5 SOLUTION RESPIRATORY (INHALATION)
Status: CANCELLED | OUTPATIENT
Start: 2017-05-17

## 2017-05-17 RX ORDER — DIPHENHYDRAMINE HYDROCHLORIDE 50 MG/ML
50 INJECTION INTRAMUSCULAR; INTRAVENOUS
Status: CANCELLED
Start: 2017-05-17

## 2017-05-17 RX ORDER — MEPERIDINE HYDROCHLORIDE 25 MG/ML
25 INJECTION INTRAMUSCULAR; INTRAVENOUS; SUBCUTANEOUS EVERY 30 MIN PRN
Status: CANCELLED | OUTPATIENT
Start: 2017-05-17

## 2017-05-17 RX ORDER — IOPAMIDOL 755 MG/ML
80 INJECTION, SOLUTION INTRAVASCULAR ONCE
Status: COMPLETED | OUTPATIENT
Start: 2017-05-17 | End: 2017-05-17

## 2017-05-17 RX ORDER — EPINEPHRINE 0.3 MG/.3ML
0.3 INJECTION SUBCUTANEOUS EVERY 5 MIN PRN
Status: CANCELLED | OUTPATIENT
Start: 2017-05-17

## 2017-05-17 RX ADMIN — SODIUM CHLORIDE 250 ML: 9 INJECTION, SOLUTION INTRAVENOUS at 13:12

## 2017-05-17 RX ADMIN — DEXTROSE MONOHYDRATE 200 MG: 50 INJECTION, SOLUTION INTRAVENOUS at 14:17

## 2017-05-17 RX ADMIN — SODIUM CHLORIDE, PRESERVATIVE FREE 5 ML: 5 INJECTION INTRAVENOUS at 10:34

## 2017-05-17 RX ADMIN — ATROPINE SULFATE 0.4 MG: 0.4 INJECTION, SOLUTION INTRAMUSCULAR; INTRAVENOUS; SUBCUTANEOUS at 14:12

## 2017-05-17 RX ADMIN — LEUCOVORIN CALCIUM 650 MG: 350 INJECTION, POWDER, LYOPHILIZED, FOR SOLUTION INTRAMUSCULAR; INTRAVENOUS at 14:15

## 2017-05-17 RX ADMIN — IOPAMIDOL 80 ML: 755 INJECTION, SOLUTION INTRAVENOUS at 08:23

## 2017-05-17 RX ADMIN — SODIUM CHLORIDE, PRESERVATIVE FREE 5 ML: 5 INJECTION INTRAVENOUS at 08:40

## 2017-05-17 RX ADMIN — SODIUM CHLORIDE, PRESERVATIVE FREE 70 ML: 5 INJECTION INTRAVENOUS at 08:23

## 2017-05-17 RX ADMIN — FAMOTIDINE 20 MG: 20 INJECTION, SOLUTION INTRAVENOUS at 13:25

## 2017-05-17 RX ADMIN — FLUOROURACIL 655 MG: 50 INJECTION, SOLUTION INTRAVENOUS at 15:47

## 2017-05-17 RX ADMIN — BEVACIZUMAB 300 MG: 100 INJECTION, SOLUTION INTRAVENOUS at 13:41

## 2017-05-17 RX ADMIN — DEXAMETHASONE SODIUM PHOSPHATE: 10 INJECTION, SOLUTION INTRAMUSCULAR; INTRAVENOUS at 13:11

## 2017-05-17 ASSESSMENT — PAIN SCALES - GENERAL: PAINLEVEL: NO PAIN (0)

## 2017-05-17 NOTE — LETTER
5/17/2017     RE: Fabienne Lynch  [address] [place] [address]     Dear Colleague,    Thank you for referring your patient, Fabienne Lynch, to the [facility]. Please see a copy of my visit note below.    [facility] Follow Up Visit    HISTORY OF PRESENT ILLNESS:    This is a followup visit for a diagnosis of metastatic rectal carcinoma.  Kindly refer to my previous  notes for details of the patient's history.      The patient was initially diagnosed with a symptomatic rectal mass and metastatic disease to her liver and probably lung.  She received chemotherapy with FOLFOX initially, transitioned to XELOX and then Xeloda and Avastin for maintenance.      Unfortunately, her can on 2/10/17 showed  progressive disease at the site of original cancer, but stable disease everywhere else.  Hence, we decided to pursue chemoradiation for the native tumor.  She completed chemorad with xeloda the week of 4/20.     REVIEW OF SYSTEMS:  On a 14-point review of systems, she is doing well.   She states that she has been feeling really well . She states that the blood in her stool has pretty much resolved at this point, she would still have days where she would have runny bowel movements but those are getting better as well.  Her hand/foot syndrome is improving .  She has residual neuropathy but feels it is slowly improving.  She denies any fevers or other infectious symptoms.  The rash on her face is  Pretty much resolved.     MEDICATIONS:   Current Outpatient Prescriptions   Medication Sig Dispense Refill     LORazepam (ATIVAN) 0.5 MG tablet Take 1 tablet (0.5 mg) by mouth every 4 hours as needed (Anxiety, Nausea/Vomiting or Sleep) 30 tablet 2     prochlorperazine (COMPAZINE) 10 MG tablet Take 1 tablet (10 mg) by mouth every 6 hours as needed (Nausea/Vomiting) (Patient not taking: Reported on 5/17/2017) 30 tablet 2     ondansetron (ZOFRAN) 8 MG tablet Take 1 tablet (8 mg) by mouth every 8  hours as needed (Nausea/Vomiting) (Patient not taking: Reported on 5/17/2017) 10 tablet 2     loperamide (IMODIUM) 2 MG capsule Start with 2 caps (4 mg), then take one cap (2 mg) after each diarrheal stool as needed. Do not use more than 8 caps (16 mg) per day. 30 capsule 0     capecitabine (XELODA) 500 MG tablet CHEMO Take 3 tablets (1,500 mg) by mouth 2 times daily for 56 doses Take for 5 days per week Monday-Friday. Do NOT take on Saturday-Sunday. Take with water within 30 minutes after meal. (Patient not taking: Reported on 3/29/2017) 168 tablet 0     Acetaminophen (TYLENOL PO) Take 500 mg by mouth every 6 hours as needed Reported on 4/12/2017            ALLERGIES:       Allergies   Allergen Reactions     Nitrous Oxide Nausea and Vomiting           PHYSICAL EXAMINATION:   VITAL SIGNS:  /72  Pulse 65  Temp 98  F (36.7  C)  Resp 18  Wt 58.5 kg (129 lb)  SpO2 99%  BMI 21.97 kg/m2     GENERAL:  Alert and oriented x3, no apparent distress.   HEENT:  Moist mucous membrane. Dry patch resolved .   EXTREMITIES:  No pedal edema.   SKIN:  She does have ongoing matthew rash which is significantly improved, she has a few areas of hyperpigmentation   Neuro : CN2-12 intact     LABORATORY EVALUATION:      Interim imaging : Progressive disease with new lung lesions although both of them are subcentimeter, rest of the disease is stable/.       ASSESSMENT AND PLAN:  A patient with metastatic rectal cancer with most recent mixed response with recent  mild progression of disease at the site of original tumor.  She finished  chemoradiation with Xeloda last week and is still recovering . She is here to reinitiate chemotherapy and has had a restaging scan which shows progressive disease with new lesions on her lungs-  I discused FOLFIRI-avastin and reviewed this with her.    Her  was accompanying her today and was again questioning the utility of surgical interventions in her case including excision of the primary and  lung metastases and they also were questioning till trial that is available at [facility] which I discussed with them.     given her persistent hyperbilirubinemia I would modify the dose of irinotecan by one dose level and follow the labs prior to the next visit .     I spent 35 minutes in the care of this patient >50% of which was spent in coordinating and counseling.     Xochitl Christine   of Medicine   Hematology and medical Oncology   [facility]  May 17, 2017

## 2017-05-17 NOTE — MR AVS SNAPSHOT
After Visit Summary   5/17/2017    Fabienne Lynch    MRN: [id number]           Patient Information     Date Of Birth          1970        Visit Information        Provider Department      5/17/2017 12:30 PM  30 ATC;  ONCOLOGY INFUSION [facility]        Today's Diagnoses     Rectal cancer (H)    -  1      Care Instructions    Contact Numbers  [facility]: [phone]    After Hours:  [phone]  Triage: [phone]    Please call the [facility] Triage line if you experience a temperature greater than or equal to 100.5, shaking chills, have uncontrolled nausea, vomiting and/or diarrhea, dizziness, shortness of breath, chest pain, bleeding, unexplained bruising, or if you have any other new/concerning symptoms, questions or concerns.     If it is after hours, weekends, or holidays, please call the main hospital  at  [phone] and ask to speak to the Oncology doctor on call.     If you are having any concerning symptoms or wish to speak to a provider before your next infusion visit, please call your care coordinator or triage to notify them so we can adequately serve you.     If you need a refill on a narcotic prescription or other medication, please call triage before your infusion appointment.         May 2017   Den Monday Tuesday Wednesday Thursday Friday Saturday        1     2     3     4     5     6       7     8  Happy Birthday!     9     10     11     12     13       14     15     16     17     CT CHEST ABDOMEN PELVIS WWO    8:20 AM   (20 min.)   [facility]   10:45 AM   (15 min.)   [facility]   [facility] Lab Draw     [facility] RETURN   11:00 AM   (30 min.)   Xochitl Christine MD   [facility] ONC INFUSION 180   12:30 PM   (180 min.)    ONCOLOGY INFUSION   [facility] 18     19    PUMP  Disconnect@  2:00PM 20       21     22     23     24     25      26     27       28     29     30     31 June 2017 Sunday Monday Tuesday Wednesday Thursday Friday Saturday                       1     [facility] LAB DRAW   12:30 PM   (15 min.)   [facility] LAB DRAW   [facility] Lab Draw     [facility] RETURN   12:45 PM   (60 min.)   Lisa Araujo PA-C   [facility] ONC INFUSION 180    1:30 PM   (180 min.)    ONCOLOGY INFUSION   [facility] 2     3       4     5     6     7     8     9     10       11     12     13     14     15     16     17       18     19     20     21     22     23     24       25     26     27     28     29     30                      Recent Results (from the past 24 hour(s))   CBC with platelets differential    Collection Time: 05/17/17 10:36 AM   Result Value Ref Range    WBC 4.0 4.0 - 11.0 10e9/L    RBC Count 3.66 (L) 3.8 - 5.2 10e12/L    Hemoglobin 12.5 11.7 - 15.7 g/dL    Hematocrit 36.1 35.0 - 47.0 %    MCV 99 78 - 100 fl    MCH 34.2 (H) 26.5 - 33.0 pg    MCHC 34.6 31.5 - 36.5 g/dL    RDW 14.3 10.0 - 15.0 %    Platelet Count 110 (L) 150 - 450 10e9/L    Diff Method Automated Method     % Neutrophils 75.2 %    % Lymphocytes 13.9 %    % Monocytes 7.3 %    % Eosinophils 3.0 %    % Basophils 0.3 %    % Immature Granulocytes 0.3 %    Nucleated RBCs 0 0 /100    Absolute Neutrophil 3.0 1.6 - 8.3 10e9/L    Absolute Lymphocytes 0.6 (L) 0.8 - 5.3 10e9/L    Absolute Monocytes 0.3 0.0 - 1.3 10e9/L    Absolute Eosinophils 0.1 0.0 - 0.7 10e9/L    Absolute Basophils 0.0 0.0 - 0.2 10e9/L    Abs Immature Granulocytes 0.0 0 - 0.4 10e9/L    Absolute Nucleated RBC 0.0    CEA    Collection Time: 05/17/17 10:36 AM   Result Value Ref Range    CEA 0.6 0 - 2.5 ug/L   Comprehensive metabolic panel    Collection Time: 05/17/17 10:36 AM   Result Value Ref Range    Sodium 142 133 - 144 mmol/L    Potassium 4.1 3.4 - 5.3 mmol/L    Chloride 109 94 - 109 mmol/L    Carbon Dioxide 26 20 - 32 mmol/L    Anion  Gap 6 3 - 14 mmol/L    Glucose 64 (L) 70 - 99 mg/dL    Urea Nitrogen 18 7 - 30 mg/dL    Creatinine 0.54 0.52 - 1.04 mg/dL    GFR Estimate >90  Non  GFR Calc   >60 mL/min/1.7m2    GFR Estimate If Black >90   GFR Calc   >60 mL/min/1.7m2    Calcium 9.4 8.5 - 10.1 mg/dL    Bilirubin Total 1.9 (H) 0.2 - 1.3 mg/dL    Albumin 3.8 3.4 - 5.0 g/dL    Protein Total 7.2 6.8 - 8.8 g/dL    Alkaline Phosphatase 84 40 - 150 U/L    ALT 32 0 - 50 U/L    AST 33 0 - 45 U/L   Protein qualitative urine    Collection Time: 05/17/17 12:54 PM   Result Value Ref Range    Protein Albumin Urine Negative NEG mg/dL               Follow-ups after your visit        Your next 10 appointments already scheduled     Jun 01, 2017 12:30 PM CDT   [facility] Lab Draw with [facility] LAB DRAW   [facility] Lab Draw ([facility])    [address] [place] [id number] [phone]            Jun 01, 2017  1:00 PM CDT   (Arrive by 12:45 PM)   Return Visit with Lisa Araujo PA-C   [facility] ([facility])    [address] [place] [id number] [phone]            Jun 01, 2017  1:30 PM CDT   Infusion 180 with  ONCOLOGY INFUSION, UC 27 ATC   [facility] ([facility])    [address] [place] [id number] [phone]              Future tests that were ordered for you today     Open Future Orders        Priority Expected Expires Ordered    CT Chest/Abdomen/Pelvis w Contrast Routine 5/17/2017 4/26/2018 4/26/2017            Who to contact     If you have questions or need follow up information about today's clinic visit or your schedule please contact [facility] directly at [phone].  Normal or non-critical lab and imaging results will be communicated to you by MyChart, letter or phone within 4 business days after  the clinic has received the results. If you do not hear from us within 7 days, please contact the clinic through [employer] or phone. If you have a critical or abnormal lab result, we will notify you by phone as soon as possible.  Submit refill requests through [employer] or call your pharmacy and they will forward the refill request to us. Please allow 3 business days for your refill to be completed.          Additional Information About Your Visit        [employer] Information     [employer] gives you secure access to your electronic health record. If you see a primary care provider, you can also send messages to your care team and make appointments. If you have questions, please call your primary care clinic.  If you do not have a primary care provider, please call [phone] and they will assist you.        Care EveryWhere ID     This is your Care EveryWhere ID. This could be used by other organizations to access your [place] medical records  [id number]         Blood Pressure from Last 3 Encounters:   05/17/17 103/72   04/26/17 110/73   04/12/17 108/72    Weight from Last 3 Encounters:   05/17/17 58.5 kg (129 lb)   04/26/17 59.6 kg (131 lb 6.4 oz)   04/12/17 59.8 kg (131 lb 14.4 oz)              We Performed the Following     CBC with platelets differential     CEA     Comprehensive metabolic panel     Protein qualitative urine     Treatment Conditions 2     Treatment Conditions          Today's Medication Changes          These changes are accurate as of: 5/17/17  4:03 PM.  If you have any questions, ask your nurse or doctor.               Start taking these medicines.        Dose/Directions    loperamide 2 MG capsule   Commonly known as:  IMODIUM   Used for:  Rectal cancer (H)        Start with 2 caps (4 mg), then take one cap (2 mg) after each diarrheal stool as needed. Do not use more than 8 caps (16 mg) per day.   Quantity:  30 capsule   Refills:  0       LORazepam 0.5 MG tablet   Commonly known as:  ATIVAN   Used  for:  Rectal cancer (H)        Dose:  0.5 mg   Take 1 tablet (0.5 mg) by mouth every 4 hours as needed (Anxiety, Nausea/Vomiting or Sleep)   Quantity:  30 tablet   Refills:  2       prochlorperazine 10 MG tablet   Commonly known as:  COMPAZINE   Used for:  Rectal cancer (H)        Dose:  10 mg   Take 1 tablet (10 mg) by mouth every 6 hours as needed (Nausea/Vomiting)   Quantity:  30 tablet   Refills:  2         These medicines have changed or have updated prescriptions.        Dose/Directions    ondansetron 8 MG tablet   Commonly known as:  ZOFRAN   This may have changed:  reasons to take this   Used for:  Rectal cancer (H)        Dose:  8 mg   Take 1 tablet (8 mg) by mouth every 8 hours as needed (Nausea/Vomiting)   Quantity:  10 tablet   Refills:  2            Where to get your medicines      These medications were sent to [place], [place] - [address] [place] [address] [address] [place] [address] [address] [address]    Hours:  TRANSPLANT PHONE NUMBER [phone] Phone:  [phone]     loperamide 2 MG capsule    ondansetron 8 MG tablet    prochlorperazine 10 MG tablet         Some of these will need a paper prescription and others can be bought over the counter.  Ask your nurse if you have questions.     Bring a paper prescription for each of these medications     LORazepam 0.5 MG tablet                Primary Care Provider Office Phone # Fax #    Golden Quinteros [phone]       [facility] [address] [place] [address]        Thank you!     Thank you for choosing [facility]  for your care. Our goal is always to provide you with excellent care. Hearing back from our patients is one way we can continue to improve our services. Please take a few minutes to complete the written survey that you may receive in the mail after your visit with us. Thank you!             Your Updated Medication List - Protect others around you: Learn  how to safely use, store and throw away your medicines at www.disposemymeds.org.          This list is accurate as of: 5/17/17  4:03 PM.  Always use your most recent med list.                   Brand Name Dispense Instructions for use    capecitabine 500 MG tablet CHEMO    XELODA    168 tablet    Take 3 tablets (1,500 mg) by mouth 2 times daily for 56 doses Take for 5 days per week Monday-Friday. Do NOT take on Saturday-Sunday. Take with water within 30 minutes after meal.       loperamide 2 MG capsule    IMODIUM    30 capsule    Start with 2 caps (4 mg), then take one cap (2 mg) after each diarrheal stool as needed. Do not use more than 8 caps (16 mg) per day.       LORazepam 0.5 MG tablet    ATIVAN    30 tablet    Take 1 tablet (0.5 mg) by mouth every 4 hours as needed (Anxiety, Nausea/Vomiting or Sleep)       ondansetron 8 MG tablet    ZOFRAN    10 tablet    Take 1 tablet (8 mg) by mouth every 8 hours as needed (Nausea/Vomiting)       prochlorperazine 10 MG tablet    COMPAZINE    30 tablet    Take 1 tablet (10 mg) by mouth every 6 hours as needed (Nausea/Vomiting)       TYLENOL PO      Take 500 mg by mouth every 6 hours as needed Reported on 4/12/2017

## 2017-05-17 NOTE — PATIENT INSTRUCTIONS
Contact Numbers  [facility]: [phone]    After Hours:  [phone]  Triage: [phone]    Please call the [facility] Triage line if you experience a temperature greater than or equal to 100.5, shaking chills, have uncontrolled nausea, vomiting and/or diarrhea, dizziness, shortness of breath, chest pain, bleeding, unexplained bruising, or if you have any other new/concerning symptoms, questions or concerns.     If it is after hours, weekends, or holidays, please call the main hospital  at  [phone] and ask to speak to the Oncology doctor on call.     If you are having any concerning symptoms or wish to speak to a provider before your next infusion visit, please call your care coordinator or triage to notify them so we can adequately serve you.     If you need a refill on a narcotic prescription or other medication, please call triage before your infusion appointment.             May 2017   Den Monday Tuesday Wednesday Thursday Friday Saturday        1     2     3     4     5     6       7     8  Happy Birthday!     9     10     11     12     13       14     15     16     17     CT CHEST ABDOMEN PELVIS WWO    8:20 AM   (20 min.)   [id number]   [facility] LAB DRAW   10:45 AM   (15 min.)    [facility] DRAW   [facility] Lab Draw     [facility] RETURN   11:00 AM   (30 min.)   Xochitl Christine MD   [facility] ONC INFUSION 180   12:30 PM   (180 min.)    ONCOLOGY INFUSION   [facility] 18     19     20       21     22     23     24     25     26     27       28     29     30     31 June 2017 Sunday Monday Tuesday Wednesday Thursday Friday Saturday                       1     [facility] DRAW    7:30 AM   (15 min.)    [facility] DRAW   [facility] Lab Draw     [facility] RETURN    7:45 AM   (60 min.)   Lisa Araujo PA-C   [facility] ONC INFUSION 180    8:30 AM    (180 min.)    ONCOLOGY INFUSION   [facility] 2     3       4     5     6     7     8     9     10       11     12     13     14     15     16     17       18     19     20     21     22     23     24       25     26     27     28     29     30                      Recent Results (from the past 24 hour(s))   CBC with platelets differential    Collection Time: 05/17/17 10:36 AM   Result Value Ref Range    WBC 4.0 4.0 - 11.0 10e9/L    RBC Count 3.66 (L) 3.8 - 5.2 10e12/L    Hemoglobin 12.5 11.7 - 15.7 g/dL    Hematocrit 36.1 35.0 - 47.0 %    MCV 99 78 - 100 fl    MCH 34.2 (H) 26.5 - 33.0 pg    MCHC 34.6 31.5 - 36.5 g/dL    RDW 14.3 10.0 - 15.0 %    Platelet Count 110 (L) 150 - 450 10e9/L    Diff Method Automated Method     % Neutrophils 75.2 %    % Lymphocytes 13.9 %    % Monocytes 7.3 %    % Eosinophils 3.0 %    % Basophils 0.3 %    % Immature Granulocytes 0.3 %    Nucleated RBCs 0 0 /100    Absolute Neutrophil 3.0 1.6 - 8.3 10e9/L    Absolute Lymphocytes 0.6 (L) 0.8 - 5.3 10e9/L    Absolute Monocytes 0.3 0.0 - 1.3 10e9/L    Absolute Eosinophils 0.1 0.0 - 0.7 10e9/L    Absolute Basophils 0.0 0.0 - 0.2 10e9/L    Abs Immature Granulocytes 0.0 0 - 0.4 10e9/L    Absolute Nucleated RBC 0.0    CEA    Collection Time: 05/17/17 10:36 AM   Result Value Ref Range    CEA 0.6 0 - 2.5 ug/L   Comprehensive metabolic panel    Collection Time: 05/17/17 10:36 AM   Result Value Ref Range    Sodium 142 133 - 144 mmol/L    Potassium 4.1 3.4 - 5.3 mmol/L    Chloride 109 94 - 109 mmol/L    Carbon Dioxide 26 20 - 32 mmol/L    Anion Gap 6 3 - 14 mmol/L    Glucose 64 (L) 70 - 99 mg/dL    Urea Nitrogen 18 7 - 30 mg/dL    Creatinine 0.54 0.52 - 1.04 mg/dL    GFR Estimate >90  Non  GFR Calc   >60 mL/min/1.7m2    GFR Estimate If Black >90   GFR Calc   >60 mL/min/1.7m2    Calcium 9.4 8.5 - 10.1 mg/dL    Bilirubin Total 1.9 (H) 0.2 - 1.3 mg/dL    Albumin 3.8 3.4 - 5.0 g/dL    Protein Total 7.2  6.8 - 8.8 g/dL    Alkaline Phosphatase 84 40 - 150 U/L    ALT 32 0 - 50 U/L    AST 33 0 - 45 U/L   Protein qualitative urine    Collection Time: 05/17/17 12:54 PM   Result Value Ref Range    Protein Albumin Urine Negative NEG mg/dL

## 2017-05-17 NOTE — NURSING NOTE
"Oncology Rooming Note    May 17, 2017 11:08 AM   Fabienne Lynch is a 47 year old female who presents for:    Chief Complaint   Patient presents with     Port Draw     port accessed by CT today labs collected and labs taken pt checked in for next appt     Oncology Clinic Visit     Return for Rectal Ca      Initial Vitals: /72  Pulse 65  Temp 98  F (36.7  C)  Resp 18  Wt 58.5 kg (129 lb)  SpO2 99%  BMI 21.97 kg/m2 Estimated body mass index is 21.97 kg/(m^2) as calculated from the following:    Height as of 4/26/17: 1.632 m (5' 4.25\").    Weight as of this encounter: 58.5 kg (129 lb). Body surface area is 1.63 meters squared.  No Pain (0) Comment: Data Unavailable   No LMP recorded. Patient has had a hysterectomy.  Allergies reviewed: Yes  Medications reviewed: Yes    Medications: Medication refills not needed today.  Pharmacy name entered into Indotrading:    [facility] #[id number] - [place], [place] - [address] [facility] [place] [facility] - [address] [address] [place], [place] - [address] [place] [address] PHARMACY - MAIL ORDER ONLY - [place], [place]    Clinical concerns: no on was notified.    7  minutes for nursing intake (face to face time)     Mayra Betts MA              "

## 2017-05-17 NOTE — MR AVS SNAPSHOT
After Visit Summary   5/17/2017    Fabienne Lynch    MRN: [id number]           Patient Information     Date Of Birth          1970        Visit Information        Provider Department      5/17/2017 11:15 AM Xochitl Christine MD formerly [facility]        Today's Diagnoses     Rectal cancer (H)           Follow-ups after your visit        Follow-up notes from your care team     Return in about 2 weeks (around 5/31/2017) for Physical Exam, Lab Work.      Your next 10 appointments already scheduled     Jun 01, 2017  7:30 AM CDT   [facility] Draw with  [facility]RAW   [facility]    [address] [address]   [phone]            Jun 01, 2017  8:00 AM CDT   (Arrive by 7:45 AM)   Return Visit with Lisa Araujo PA-C   formerly [facility] ([facility])    [address] [place] [id number] [phone]            Jun 01, 2017  8:30 AM CDT   Infusion 180 with  ONCOLOGY INFUSION, UC 20 ATC   formerly [facility] ([facility])    [address] [place] [id number]   [phone]            Jun 07, 2017  7:15 AM CDT   [facility] Lab Draw with UC [facility] DRAW   [facility]    [address] [place] [id number]   [phone]            Jun 07, 2017  7:45 AM CDT   (Arrive by 7:30 AM)   Return Visit with Xochitl Christine MD   formerly [facility] ([facility])    [address] [place] [phone]   [phone]              Who to contact     If you have questions or need follow up information about today's clinic visit or your schedule please contact [facility] directly at [phone].  Normal or non-critical lab and imaging results will  be communicated to you by PredicSishart, letter or phone within 4 business days after the clinic has received the results. If you do not hear from us within 7 days, please contact the clinic through Online Prasad or phone. If you have a critical or abnormal lab result, we will notify you by phone as soon as possible.  Submit refill requests through Online Prasad or call your pharmacy and they will forward the refill request to us. Please allow 3 business days for your refill to be completed.          Additional Information About Your Visit        Online Prasad Information     Online Prasad gives you secure access to your electronic health record. If you see a primary care provider, you can also send messages to your care team and make appointments. If you have questions, please call your primary care clinic.  If you do not have a primary care provider, please call [phone] and they will assist you.        Care EveryWhere ID     This is your Care EveryWhere ID. This could be used by other organizations to access your [facility] medical records  [id number]        Your Vitals Were     Pulse Temperature Respirations Pulse Oximetry BMI (Body Mass Index)       65 98  F (36.7  C) 18 99% 21.97 kg/m2        Blood Pressure from Last 3 Encounters:   05/17/17 103/72   04/26/17 110/73   04/12/17 108/72    Weight from Last 3 Encounters:   05/17/17 58.5 kg (129 lb)   04/26/17 59.6 kg (131 lb 6.4 oz)   04/12/17 59.8 kg (131 lb 14.4 oz)              Today, you had the following     No orders found for display         Today's Medication Changes          These changes are accurate as of: 5/17/17 11:59 PM.  If you have any questions, ask your nurse or doctor.               Start taking these medicines.        Dose/Directions    loperamide 2 MG capsule   Commonly known as:  IMODIUM   Used for:  Rectal cancer (H)        Start with 2 caps (4 mg), then take one cap (2 mg) after each diarrheal stool as needed. Do not use more than 8 caps (16 mg) per day.   Quantity:   30 capsule   Refills:  0       LORazepam 0.5 MG tablet   Commonly known as:  ATIVAN   Used for:  Rectal cancer (H)        Dose:  0.5 mg   Take 1 tablet (0.5 mg) by mouth every 4 hours as needed (Anxiety, Nausea/Vomiting or Sleep)   Quantity:  30 tablet   Refills:  2       prochlorperazine 10 MG tablet   Commonly known as:  COMPAZINE   Used for:  Rectal cancer (H)        Dose:  10 mg   Take 1 tablet (10 mg) by mouth every 6 hours as needed (Nausea/Vomiting)   Quantity:  30 tablet   Refills:  2         These medicines have changed or have updated prescriptions.        Dose/Directions    ondansetron 8 MG tablet   Commonly known as:  ZOFRAN   This may have changed:  reasons to take this   Used for:  Rectal cancer (H)        Dose:  8 mg   Take 1 tablet (8 mg) by mouth every 8 hours as needed (Nausea/Vomiting)   Quantity:  10 tablet   Refills:  2            Where to get your medicines      These medications were sent to [address] [address]    Hours:  TRANSPLANT PHONE NUMBER [phone] Phone:  [phone]     loperamide 2 MG capsule    ondansetron 8 MG tablet    prochlorperazine 10 MG tablet         Some of these will need a paper prescription and others can be bought over the counter.  Ask your nurse if you have questions.     Bring a paper prescription for each of these medications     LORazepam 0.5 MG tablet                Primary Care Provider Office Phone # Fax #    Golden Quinteros [phone]       [address] [address]        Thank you!     Thank you for choosing [facility]  for your care. Our goal is always to provide you with excellent care. Hearing back from our patients is one way we can continue to improve our services. Please take a few minutes to complete the written survey that you may receive in the mail after your visit with  us. Thank you!             Your Updated Medication List - Protect others around you: Learn how to safely use, store and throw away your medicines at www.disposemymeds.org.          This list is accurate as of: 5/17/17 11:59 PM.  Always use your most recent med list.                   Brand Name Dispense Instructions for use    capecitabine 500 MG tablet CHEMO    XELODA    168 tablet    Take 3 tablets (1,500 mg) by mouth 2 times daily for 56 doses Take for 5 days per week Monday-Friday. Do NOT take on Saturday-Sunday. Take with water within 30 minutes after meal.       loperamide 2 MG capsule    IMODIUM    30 capsule    Start with 2 caps (4 mg), then take one cap (2 mg) after each diarrheal stool as needed. Do not use more than 8 caps (16 mg) per day.       LORazepam 0.5 MG tablet    ATIVAN    30 tablet    Take 1 tablet (0.5 mg) by mouth every 4 hours as needed (Anxiety, Nausea/Vomiting or Sleep)       ondansetron 8 MG tablet    ZOFRAN    10 tablet    Take 1 tablet (8 mg) by mouth every 8 hours as needed (Nausea/Vomiting)       prochlorperazine 10 MG tablet    COMPAZINE    30 tablet    Take 1 tablet (10 mg) by mouth every 6 hours as needed (Nausea/Vomiting)       TYLENOL PO      Take 500 mg by mouth every 6 hours as needed Reported on 4/12/2017

## 2017-05-17 NOTE — PROGRESS NOTES
Infusion Nursing Note:  Fabienne Lynch presents today for C1 Avastin-Irinotecan-Leucovorin-Fluorouracil bolus/pump.    Patient seen by provider today: Yes: Dr Christine    Treatment Conditions:  Lab Results   Component Value Date    HGB 12.5 05/17/2017     Lab Results   Component Value Date    WBC 4.0 05/17/2017      Lab Results   Component Value Date    ANEU 3.0 05/17/2017     Lab Results   Component Value Date     05/17/2017      Lab Results   Component Value Date     05/17/2017                   Lab Results   Component Value Date    POTASSIUM 4.1 05/17/2017           No results found for: MAG         Lab Results   Component Value Date    CR 0.54 05/17/2017                   Lab Results   Component Value Date    ANDRZEJ 9.4 05/17/2017                Lab Results   Component Value Date    BILITOTAL 1.9 05/17/2017           Lab Results   Component Value Date    ALBUMIN 3.8 05/17/2017                    Lab Results   Component Value Date    ALT 32 05/17/2017           Lab Results   Component Value Date    AST 33 05/17/2017     Results reviewed, labs MET treatment parameters, ok to proceed with treatment.  BP:103/72  Urine: negative for protein    Intravenous Access:  Implanted Port.  Access left intact at time of discharge with pump attached per protocol.      Note:   Results reviewed, copy given to patient.  Proceed with treatment.   + BR checked and noted prior to C series pump hookup.  Fluorouracil pump running @ 5/2 ml/hour for 46 hours.   Pump attached per protocol and connections checked by Lucila Lenz RN.  Veto @ [facility] aware to disconnect pt on 5/19 @1400 at pt's place of employment in [facility].   Copy of AVS given to patient. Tolerated infusion without incident.  Prescriptions filled today.   D/C in care of spouse.  Pt will return 6/1 for provider appt/C2 Folfiri-Avastin.       Ghada Jacobson, MONO    Administrations This Visit     0.9% sodium chloride BOLUS     Admin Date Action Dose Route  Administered By             05/17/2017 New Bag 250 mL Intravenous Ghada Jacobson RN                    atropine injection 0.4 mg     Admin Date Action Dose Route Administered By             05/17/2017 Given 0.4 mg Intravenous Ghada Jacobson RN                    bevacizumab (AVASTIN) 300 mg in NaCl 0.9 % 122 mL CHEMOTHERAPY     Admin Date Action Dose Rate Route Administered By          05/17/2017 New Bag 300 mg 244 mL/hr Intravenous Ghada Jacobson RN                   famotidine (PEPCID) infusion 20 mg     Admin Date Action Dose Route Administered By             05/17/2017 New Bag 20 mg Intravenous Ghada Jacobson RN                    fluorouracil (ADRUCIL) 3,935 mg in NaCl 0.9 % 241 mL Home Infusion Chemotherapy     Admin Date Action Dose Rate Route Administered By          05/17/2017 Given 3935 mg 5.2 mL/hr Intravenous Ghada Jacobson RN                   fluorouracil (ADRUCIL) injection CHEMO 655 mg     Admin Date Action Dose Route Administered By             05/17/2017 Given 655 mg Intravenous Ghada Jacobson RN                    irinotecan (CAMPTOSAR) 200 mg in D5W 560 mL CHEMOTHERAPY     Admin Date Action Dose Rate Route Administered By          05/17/2017 New Bag 200 mg 373.3 mL/hr Intravenous Ghada Jacobson RN                   leucovorin 650 mg in D5W 100 mL infusion     Admin Date Action Dose Rate Route Administered By          05/17/2017 New Bag 650 mg 95.3 mL/hr Intravenous Ghada Jacobson RN                   ondansetron (ZOFRAN) 8 mg, dexamethasone (DECADRON) 12 mg in NaCl 0.9 % 50 mL intermittent infusion     Admin Date Action Dose Rate Route Administered By          05/17/2017 New Bag   200 mL/hr Intravenous Ghada Jacobson RN

## 2017-05-22 NOTE — PROGRESS NOTES
[facility] Follow Up Visit    HISTORY OF PRESENT ILLNESS:    This is a followup visit for a diagnosis of metastatic rectal carcinoma.  Kindly refer to my previous  notes for details of the patient's history.      The patient was initially diagnosed with a symptomatic rectal mass and metastatic disease to her liver and probably lung.  She received chemotherapy with FOLFOX initially, transitioned to XELOX and then Xeloda and Avastin for maintenance.      Unfortunately, her can on 2/10/17 showed  progressive disease at the site of original cancer, but stable disease everywhere else.  Hence, we decided to pursue chemoradiation for the native tumor.  She completed chemorad with xeloda the week of 4/20.     REVIEW OF SYSTEMS:  On a 14-point review of systems, she is doing well.   She states that she has been feeling really well . She states that the blood in her stool has pretty much resolved at this point, she would still have days where she would have runny bowel movements but those are getting better as well.  Her hand/foot syndrome is improving .  She has residual neuropathy but feels it is slowly improving.  She denies any fevers or other infectious symptoms.  The rash on her face is  Pretty much resolved.     MEDICATIONS:   Current Outpatient Prescriptions   Medication Sig Dispense Refill     LORazepam (ATIVAN) 0.5 MG tablet Take 1 tablet (0.5 mg) by mouth every 4 hours as needed (Anxiety, Nausea/Vomiting or Sleep) 30 tablet 2     prochlorperazine (COMPAZINE) 10 MG tablet Take 1 tablet (10 mg) by mouth every 6 hours as needed (Nausea/Vomiting) (Patient not taking: Reported on 5/17/2017) 30 tablet 2     ondansetron (ZOFRAN) 8 MG tablet Take 1 tablet (8 mg) by mouth every 8 hours as needed (Nausea/Vomiting) (Patient not taking: Reported on 5/17/2017) 10 tablet 2     loperamide (IMODIUM) 2 MG capsule Start with 2 caps (4 mg), then take one cap (2 mg) after each diarrheal stool as needed. Do not use more than  8 caps (16 mg) per day. 30 capsule 0     capecitabine (XELODA) 500 MG tablet CHEMO Take 3 tablets (1,500 mg) by mouth 2 times daily for 56 doses Take for 5 days per week Monday-Friday. Do NOT take on Saturday-Sunday. Take with water within 30 minutes after meal. (Patient not taking: Reported on 3/29/2017) 168 tablet 0     Acetaminophen (TYLENOL PO) Take 500 mg by mouth every 6 hours as needed Reported on 4/12/2017            ALLERGIES:       Allergies   Allergen Reactions     Nitrous Oxide Nausea and Vomiting           PHYSICAL EXAMINATION:   VITAL SIGNS:  /72  Pulse 65  Temp 98  F (36.7  C)  Resp 18  Wt 58.5 kg (129 lb)  SpO2 99%  BMI 21.97 kg/m2     GENERAL:  Alert and oriented x3, no apparent distress.   HEENT:  Moist mucous membrane. Dry patch resolved .   EXTREMITIES:  No pedal edema.   SKIN:  She does have ongoing matthew rash which is significantly improved, she has a few areas of hyperpigmentation   Neuro : CN2-12 intact     LABORATORY EVALUATION:      Interim imaging : Progressive disease with new lung lesions although both of them are subcentimeter, rest of the disease is stable/.       ASSESSMENT AND PLAN:  A patient with metastatic rectal cancer with most recent mixed response with recent  mild progression of disease at the site of original tumor.  She finished  chemoradiation with Xeloda last week and is still recovering . She is here to reinitiate chemotherapy and has had a restaging scan which shows progressive disease with new lesions on her lungs-  I discused FOLFIRI-avastin and reviewed this with her.    Her  was accompanying her today and was again questioning the utility of surgical interventions in her case including excision of the primary and lung metastases and they also were questioning till trial that is available at [facility] which I discussed with them.     given her persistent hyperbilirubinemia I would modify the dose of irinotecan by one dose level and follow the labs prior  to the next visit .     I spent 35 minutes in the care of this patient >50% of which was spent in coordinating and counseling.  Xochitl Christine   of Medicine   Hematology and medical Oncology   [facility]  May 17, 2017

## 2017-06-07 ENCOUNTER — HOME INFUSION (PRE-WILLOW HOME INFUSION) (OUTPATIENT)
Dept: PHARMACY | Facility: CLINIC | Age: 47
End: 2017-06-07

## 2017-06-07 ENCOUNTER — APPOINTMENT (OUTPATIENT)
Dept: LAB | Facility: CLINIC | Age: 47
End: 2017-06-07
Attending: INTERNAL MEDICINE
Payer: COMMERCIAL

## 2017-06-07 ENCOUNTER — ONCOLOGY VISIT (OUTPATIENT)
Dept: ONCOLOGY | Facility: CLINIC | Age: 47
End: 2017-06-07
Attending: INTERNAL MEDICINE
Payer: COMMERCIAL

## 2017-06-07 VITALS
DIASTOLIC BLOOD PRESSURE: 74 MMHG | BODY MASS INDEX: 21.92 KG/M2 | HEART RATE: 72 BPM | HEIGHT: 64 IN | SYSTOLIC BLOOD PRESSURE: 102 MMHG | RESPIRATION RATE: 16 BRPM | WEIGHT: 128.4 LBS | TEMPERATURE: 98.1 F | OXYGEN SATURATION: 100 %

## 2017-06-07 DIAGNOSIS — C20 RECTAL CANCER (H): Primary | ICD-10-CM

## 2017-06-07 DIAGNOSIS — C20 RECTAL CANCER (H): ICD-10-CM

## 2017-06-07 DIAGNOSIS — C25.0 MALIGNANT NEOPLASM OF HEAD OF PANCREAS (H): Primary | ICD-10-CM

## 2017-06-07 LAB
ALBUMIN UR-MCNC: NEGATIVE MG/DL
BASOPHILS # BLD AUTO: 0 10E9/L (ref 0–0.2)
BASOPHILS NFR BLD AUTO: 0.9 %
BILIRUB SERPL-MCNC: 1.7 MG/DL (ref 0.2–1.3)
DIFFERENTIAL METHOD BLD: ABNORMAL
EOSINOPHIL # BLD AUTO: 0.1 10E9/L (ref 0–0.7)
EOSINOPHIL NFR BLD AUTO: 3 %
ERYTHROCYTE [DISTWIDTH] IN BLOOD BY AUTOMATED COUNT: 13.7 % (ref 10–15)
HCT VFR BLD AUTO: 36.7 % (ref 35–47)
HGB BLD-MCNC: 12.5 G/DL (ref 11.7–15.7)
IMM GRANULOCYTES # BLD: 0 10E9/L (ref 0–0.4)
IMM GRANULOCYTES NFR BLD: 0.4 %
LYMPHOCYTES # BLD AUTO: 0.5 10E9/L (ref 0.8–5.3)
LYMPHOCYTES NFR BLD AUTO: 19.5 %
MCH RBC QN AUTO: 33.5 PG (ref 26.5–33)
MCHC RBC AUTO-ENTMCNC: 34.1 G/DL (ref 31.5–36.5)
MCV RBC AUTO: 98 FL (ref 78–100)
MONOCYTES # BLD AUTO: 0.3 10E9/L (ref 0–1.3)
MONOCYTES NFR BLD AUTO: 13 %
NEUTROPHILS # BLD AUTO: 1.5 10E9/L (ref 1.6–8.3)
NEUTROPHILS NFR BLD AUTO: 63.2 %
NRBC # BLD AUTO: 0 10*3/UL
NRBC BLD AUTO-RTO: 0 /100
PLATELET # BLD AUTO: 102 10E9/L (ref 150–450)
RBC # BLD AUTO: 3.73 10E12/L (ref 3.8–5.2)
WBC # BLD AUTO: 2.3 10E9/L (ref 4–11)

## 2017-06-07 PROCEDURE — 25000125 ZZHC RX 250: Mod: ZF | Performed by: INTERNAL MEDICINE

## 2017-06-07 PROCEDURE — 96367 TX/PROPH/DG ADDL SEQ IV INF: CPT

## 2017-06-07 PROCEDURE — 85025 COMPLETE CBC W/AUTO DIFF WBC: CPT | Performed by: INTERNAL MEDICINE

## 2017-06-07 PROCEDURE — 96375 TX/PRO/DX INJ NEW DRUG ADDON: CPT

## 2017-06-07 PROCEDURE — 81003 URINALYSIS AUTO W/O SCOPE: CPT | Performed by: INTERNAL MEDICINE

## 2017-06-07 PROCEDURE — 82247 BILIRUBIN TOTAL: CPT | Performed by: INTERNAL MEDICINE

## 2017-06-07 PROCEDURE — 96411 CHEMO IV PUSH ADDL DRUG: CPT

## 2017-06-07 PROCEDURE — 96413 CHEMO IV INFUSION 1 HR: CPT

## 2017-06-07 PROCEDURE — 40000268 ZZH STATISTIC NO CHARGES: Mod: ZF

## 2017-06-07 PROCEDURE — 96368 THER/DIAG CONCURRENT INF: CPT

## 2017-06-07 PROCEDURE — 36415 COLL VENOUS BLD VENIPUNCTURE: CPT | Performed by: INTERNAL MEDICINE

## 2017-06-07 PROCEDURE — 25000128 H RX IP 250 OP 636: Mod: ZF | Performed by: INTERNAL MEDICINE

## 2017-06-07 PROCEDURE — 96417 CHEMO IV INFUS EACH ADDL SEQ: CPT

## 2017-06-07 PROCEDURE — 96416 CHEMO PROLONG INFUSE W/PUMP: CPT

## 2017-06-07 PROCEDURE — 99214 OFFICE O/P EST MOD 30 MIN: CPT | Mod: ZP | Performed by: INTERNAL MEDICINE

## 2017-06-07 RX ORDER — LORAZEPAM 2 MG/ML
0.5 INJECTION INTRAMUSCULAR EVERY 4 HOURS PRN
Status: CANCELLED
Start: 2017-06-07

## 2017-06-07 RX ORDER — DIPHENHYDRAMINE HYDROCHLORIDE 50 MG/ML
50 INJECTION INTRAMUSCULAR; INTRAVENOUS
Status: CANCELLED
Start: 2017-06-07

## 2017-06-07 RX ORDER — HEPARIN SODIUM (PORCINE) LOCK FLUSH IV SOLN 100 UNIT/ML 100 UNIT/ML
5 SOLUTION INTRAVENOUS
Status: COMPLETED | OUTPATIENT
Start: 2017-06-07 | End: 2017-06-07

## 2017-06-07 RX ORDER — MEPERIDINE HYDROCHLORIDE 25 MG/ML
25 INJECTION INTRAMUSCULAR; INTRAVENOUS; SUBCUTANEOUS EVERY 30 MIN PRN
Status: CANCELLED | OUTPATIENT
Start: 2017-06-07

## 2017-06-07 RX ORDER — FLUOROURACIL 50 MG/ML
400 INJECTION, SOLUTION INTRAVENOUS ONCE
Status: COMPLETED | OUTPATIENT
Start: 2017-06-07 | End: 2017-06-07

## 2017-06-07 RX ORDER — METHYLPREDNISOLONE SODIUM SUCCINATE 125 MG/2ML
125 INJECTION, POWDER, LYOPHILIZED, FOR SOLUTION INTRAMUSCULAR; INTRAVENOUS
Status: CANCELLED
Start: 2017-06-07

## 2017-06-07 RX ORDER — FLUOROURACIL 50 MG/ML
400 INJECTION, SOLUTION INTRAVENOUS ONCE
Status: CANCELLED | OUTPATIENT
Start: 2017-06-07

## 2017-06-07 RX ORDER — ALBUTEROL SULFATE 0.83 MG/ML
2.5 SOLUTION RESPIRATORY (INHALATION)
Status: CANCELLED | OUTPATIENT
Start: 2017-06-07

## 2017-06-07 RX ORDER — ALBUTEROL SULFATE 90 UG/1
1-2 AEROSOL, METERED RESPIRATORY (INHALATION)
Status: CANCELLED
Start: 2017-06-07

## 2017-06-07 RX ORDER — EPINEPHRINE 0.3 MG/.3ML
0.3 INJECTION SUBCUTANEOUS EVERY 5 MIN PRN
Status: CANCELLED | OUTPATIENT
Start: 2017-06-07

## 2017-06-07 RX ORDER — SODIUM CHLORIDE 9 MG/ML
1000 INJECTION, SOLUTION INTRAVENOUS CONTINUOUS PRN
Status: CANCELLED
Start: 2017-06-07

## 2017-06-07 RX ADMIN — LEUCOVORIN CALCIUM 650 MG: 350 INJECTION, POWDER, LYOPHILIZED, FOR SOLUTION INTRAMUSCULAR; INTRAVENOUS at 09:54

## 2017-06-07 RX ADMIN — SODIUM CHLORIDE 250 ML: 9 INJECTION, SOLUTION INTRAVENOUS at 09:03

## 2017-06-07 RX ADMIN — FLUOROURACIL 655 MG: 50 INJECTION, SOLUTION INTRAVENOUS at 11:31

## 2017-06-07 RX ADMIN — DEXAMETHASONE SODIUM PHOSPHATE: 10 INJECTION, SOLUTION INTRAMUSCULAR; INTRAVENOUS at 09:03

## 2017-06-07 RX ADMIN — ATROPINE SULFATE 0.4 MG: 0.4 INJECTION, SOLUTION INTRAMUSCULAR; INTRAVENOUS; SUBCUTANEOUS at 09:52

## 2017-06-07 RX ADMIN — IRINOTECAN HYDROCHLORIDE 150 MG: 40 INJECTION, SOLUTION INTRAVENOUS at 09:55

## 2017-06-07 RX ADMIN — BEVACIZUMAB 300 MG: 100 INJECTION, SOLUTION INTRAVENOUS at 09:20

## 2017-06-07 RX ADMIN — SODIUM CHLORIDE, PRESERVATIVE FREE 5 ML: 5 INJECTION INTRAVENOUS at 07:28

## 2017-06-07 ASSESSMENT — PAIN SCALES - GENERAL: PAINLEVEL: NO PAIN (0)

## 2017-06-07 NOTE — NURSING NOTE
"Chief Complaint   Patient presents with     Port Draw     port accessed and labs drawn by rn.  vs taken.     Port accessed with 20g 3/4\" power needle, labs drawn, port flushed with saline and heparin, vitals checked, pt checked in for next appointment.  Wilma Alanis RN    "

## 2017-06-07 NOTE — PROGRESS NOTES
HISTORY OF PRESENT ILLNESS:  This is a followup visit for a diagnosis of metastatic rectal cancer.  Kindly refer to the details of the patient's presentation and treatment so far in my previous notes.  Briefly, the patient was diagnosed with an obstructing rectal mass.  She was initiated on FOLFOX and then bevacizumab was added later.  She had a very good response to the metastatic disease in her liver and also at the primary site.  She was switched to 5-FU and Avastin maintenance, but unfortunately at the time of followup scan she was found to have progressive disease at the site of the original tumor.  She underwent chemoradiation for the rectal lesion and then took a break because of multiple family events and obligations.  At the time of initiation of the chemotherapy with 5-FU, Avastin and irinotecan, she had mildly progressive disease.      Interval history:   She received cycle 1 of FOLFIRI and Avastin 3 weeks ago and immediately noticed that she had more nausea than before.  She, in fact, ended up having an episode of vomiting 2 days later, but after that she felt well.  Also, she has noticed that she is losing quite a bit of hair with this chemotherapy; she had no hair loss with FOLFOX and Avastin in the past.  She does report that the hand-foot syndrome that she was dealing with and was pretty bad with Xeloda has improved quite a bit to the point that all the cracking and pigmentation have resolved.  She does have ongoing bowel emergencies after radiation treatment and irinotecan chemo has maybe accentuated it; she is not sure.  Otherwise, she denies fevers, chills, coughing, shortness of breath or abdominal pain.  She has not had any bleeding complications.  The bleeding that she was having per rectum after finishing up with radiation has resolved as well.       PAST MEDICAL HISTORY:  Unchanged.       PAST SURGICAL HISTORY:  Unchanged.      FAMILY HISTORY:  Unchanged.       SOCIAL HISTORY:  Unchanged.   "Her son, Ranjith, has gone to [place] for  boot camp.      PHYSICAL EXAMINATION:   VITAL SIGNS:   /74 (BP Location: Right arm, Patient Position: Chair, Cuff Size: Adult Regular)  Pulse 72  Temp 98.1  F (36.7  C) (Oral)  Resp 16  Ht 1.632 m (5' 4.25\")  Wt 58.2 kg (128 lb 6.4 oz)  SpO2 100%  BMI 21.87 kg/m2    GENERAL:  Alert and oriented x3, no apparent distress.   HEENT:  Moist mucous membranes.  No mucositis.   CARDIOVASCULAR:  S1, S2 clear.   RESPIRATORY:  Clear to auscultation.  Port site looks good.     SKIN:  All the cracking and peeling has resolved pretty much at this point.   LOWER EXTREMITIES:  No pedal edema.   ABDOMEN:  Nontender to palpation.      LABORATORY DATA:  Laboratory exam is remarkable for a mildly elevated bilirubin of 1.7.  This is better than her last count at 1.9 when we had done dose reduction for irinotecan by one dose level.  Her white count is low today at 2.3 total with an absolute neutrophil count of 1.5.  Her hemoglobin is stable at 12.5 and platelets are 102,000.      ASSESSMENT AND PLAN:  This is a patient with metastatic rectal cancer, currently on FOLFIRI plus Avastin.  Today her counts are lower despite this being week 3.  I discussed this with the patient.  Technically she meets parameters to do the treatment, but I would like to go down further in dose for irinotecan to 50% dose for her today and have her follow up in 2 weeks' time.  We did discuss in great detail possible signs of infection and how if she has a fever greater than 100.4 she needs to call and report to the ER immediately because she would probably be somewhere around 500 for her absolute neutrophil count and at that point she would need IV antibiotics.  She and her  expressed understanding.  They were wondering if any new drugs have been approved for treatment of her kind of cancer.  I did go over the recent press released from [employer] with them.  None of the drugs are actually pertinent " to her case as of right now.  They expressed understanding and disappointment.     I spent 35 minutes in the care of this patient >50% of which was spent in coordinating and counseling.      Xochitl Christine   of Medicine   Hematology and medical Oncology   [facility]

## 2017-06-07 NOTE — PATIENT INSTRUCTIONS
[facility] Main Line: [phone]    Call triage nurse with chills and/or temperature greater than or equal to 100.4, uncontrolled nausea/vomiting, diarrhea, constipation, dizziness, shortness of breath, chest pain, bleeding, unexplained bruising, or any new/concerning symptoms, questions/concerns.   If you are having any concerning symptoms or wish to speak to a provider before your next infusion visit, please call your care coordinator or triage to notify them so we can adequately serve you.   Triage Nurse Line: [phone]    If after hours, weekends, or holidays, call main hospital  and ask for Oncology doctor on call @ [phone]               June 2017 Sunday Monday Tuesday Wednesday Thursday Friday Saturday                       1     2     3       4     5     6     7     [facility] LAB DRAW    7:15 AM   (15 min.)    [facility]     [facility] RETURN    7:30 AM   (30 min.)   Xochitl Christine MD   [facility]   10:00 AM   (180 min.)    ONCOLOGY INFUSION   [facility] 8     9     10       11     12     13     14     15     16     17       18     19     20     21     [facility] LAB DRAW    6:30 AM   (15 min.)    MASONIC LAB DRAW   [facility] Masonic Lab Draw     [facility] RETURN    6:45 AM   (50 min.)   Azul Mansfield PA-C   [facility]    8:00 AM   (180 min.)    ONCOLOGY INFUSION   [facility] 22     23     24       25     26     27     28     29     30 July 2017 Sunday Monday Tuesday Wednesday Thursday Friday Saturday                                 1       2     3     4     5     6     7     8       9     10     11     12     13     14     15       16     17     18     19     20     21     22       23     24     25     26     27     28     29       30     31                                           Lab  Results:  Recent Results (from the past 12 hour(s))   CBC with platelets differential    Collection Time: 06/07/17  7:39 AM   Result Value Ref Range    WBC 2.3 (L) 4.0 - 11.0 10e9/L    RBC Count 3.73 (L) 3.8 - 5.2 10e12/L    Hemoglobin 12.5 11.7 - 15.7 g/dL    Hematocrit 36.7 35.0 - 47.0 %    MCV 98 78 - 100 fl    MCH 33.5 (H) 26.5 - 33.0 pg    MCHC 34.1 31.5 - 36.5 g/dL    RDW 13.7 10.0 - 15.0 %    Platelet Count 102 (L) 150 - 450 10e9/L    Diff Method Automated Method     % Neutrophils 63.2 %    % Lymphocytes 19.5 %    % Monocytes 13.0 %    % Eosinophils 3.0 %    % Basophils 0.9 %    % Immature Granulocytes 0.4 %    Nucleated RBCs 0 0 /100    Absolute Neutrophil 1.5 (L) 1.6 - 8.3 10e9/L    Absolute Lymphocytes 0.5 (L) 0.8 - 5.3 10e9/L    Absolute Monocytes 0.3 0.0 - 1.3 10e9/L    Absolute Eosinophils 0.1 0.0 - 0.7 10e9/L    Absolute Basophils 0.0 0.0 - 0.2 10e9/L    Abs Immature Granulocytes 0.0 0 - 0.4 10e9/L    Absolute Nucleated RBC 0.0    Bilirubin  total    Collection Time: 06/07/17  7:39 AM   Result Value Ref Range    Bilirubin Total 1.7 (H) 0.2 - 1.3 mg/dL   Protein qualitative urine    Collection Time: 06/07/17  7:39 AM   Result Value Ref Range    Protein Albumin Urine Negative NEG mg/dL

## 2017-06-07 NOTE — PROGRESS NOTES
Infusion Nursing Note:  Fabienne Lynch presents today for Cycle 2 Day 1 Avastin, Leucovorin, Irinotecan, Fluorouracil bolus/pump.    Patient seen by provider today: Yes: Dr. Christine   present during visit today: Not Applicable.      Intravenous Access:  Implanted Port.    Treatment Conditions:  Lab Results   Component Value Date    HGB 12.5 06/07/2017     Lab Results   Component Value Date    WBC 2.3 06/07/2017      Lab Results   Component Value Date    ANEU 1.5 06/07/2017     Lab Results   Component Value Date     06/07/2017      Lab Results   Component Value Date     05/17/2017                   Lab Results   Component Value Date    POTASSIUM 4.1 05/17/2017           No results found for: MAG         Lab Results   Component Value Date    CR 0.54 05/17/2017                   Lab Results   Component Value Date    ANDRZEJ 9.4 05/17/2017                Lab Results   Component Value Date    BILITOTAL 1.7 06/07/2017           Lab Results   Component Value Date    ALBUMIN 3.8 05/17/2017                    Lab Results   Component Value Date    ALT 32 05/17/2017           Lab Results   Component Value Date    AST 33 05/17/2017     /74  Urine protein negative    Results reviewed, labs MET treatment parameters, ok to proceed with treatment.      Post Infusion Assessment:  Patient tolerated infusion without incident.  Blood return noted pre and post infusion.  Site patent and intact, free from redness, edema or discomfort.  No evidence of extravasations.  Positive blood return pre/post Fluorouracil bolus and prior to Fluorouracil pump connect.  C-Series Fluorouracil pump infusing at 5.2ml/hr x46 hours.  All connections taped, clamps taped to open, capillary element taped down with tegaderm.  Pump connections double checked by Diandra ALEX RN.  [facility] (spoke with Lucila) contacted with pump d/c date/time (Friday at 0930).  She is aware that patient will be disconnected at work.    Discharge Plan:   Patient  declined prescription refills.  Copy of AVS reviewed with patient and/or family.  Patient will return 6/21/17 for next appointment.  Patient discharged in stable condition accompanied by: .  Departure Mode: Ambulatory.  Face to Face time: 0.    Kalee Mathis RN

## 2017-06-07 NOTE — MR AVS SNAPSHOT
After Visit Summary   6/7/2017    Fabienne Lynch    MRN: [id number]           Patient Information     Date Of Birth          1970        Visit Information        Provider Department      6/7/2017 7:45 AM Xocihtl Christine MD [facility]        Today's Diagnoses     Malignant neoplasm of head of pancreas (H)    -  1    Rectal cancer (H)           Follow-ups after your visit        Follow-up notes from your care team     Return in about 14 days (around 6/21/2017).      Your next 10 appointments already scheduled     Jun 21, 2017  6:30 AM CDT   Masonic Lab Draw with [facility] MASONIC LAB DRAW   [facility] Lab Draw ([facility])    [address] [place] [address] [phone]            Jun 21, 2017  7:00 AM CDT   (Arrive by 6:45 AM)   Return Visit with Azul Mansfield PA-C   [facility] ([facility])    [address] [place] [address] [phone]            Jun 21, 2017  8:00 AM CDT   Infusion 180 with  ONCOLOGY INFUSION, UC 18 ATC   [facility] ([facility])    [address] [place] [address] [phone]              Who to contact     If you have questions or need follow up information about today's clinic visit or your schedule please contact [facility] directly at [phone].  Normal or non-critical lab and imaging results will be communicated to you by MyChart, letter or phone within 4 business days after the clinic has received the results. If you do not hear from us within 7 days, please contact the clinic through MyChart or phone. If you have a critical or abnormal lab result, we will notify you by phone as soon as possible.  Submit refill requests through [email or web address] or call your pharmacy and they will forward the refill request to us. Please allow 3 business  "days for your refill to be completed.          Additional Information About Your Visit        MyChart Information     Doximity gives you secure access to your electronic health record. If you see a primary care provider, you can also send messages to your care team and make appointments. If you have questions, please call your primary care clinic.  If you do not have a primary care provider, please call [phone] and they will assist you.        Care EveryWhere ID     This is your Care EveryWhere ID. This could be used by other organizations to access your [facility] medical records  [id number]        Your Vitals Were     Pulse Temperature Respirations Height Pulse Oximetry BMI (Body Mass Index)    72 98.1  F (36.7  C) (Oral) 16 1.632 m (5' 4.25\") 100% 21.87 kg/m2       Blood Pressure from Last 3 Encounters:   06/07/17 102/74   05/17/17 103/72   04/26/17 110/73    Weight from Last 3 Encounters:   06/07/17 58.2 kg (128 lb 6.4 oz)   05/17/17 58.5 kg (129 lb)   04/26/17 59.6 kg (131 lb 6.4 oz)              Today, you had the following     No orders found for display       Primary Care Provider Office Phone # Fax #    Golden Quinteros [phone]       [address] [address]        Thank you!     Thank you for choosing [facility]  for your care. Our goal is always to provide you with excellent care. Hearing back from our patients is one way we can continue to improve our services. Please take a few minutes to complete the written survey that you may receive in the mail after your visit with us. Thank you!             Your Updated Medication List - Protect others around you: Learn how to safely use, store and throw away your medicines at www.disposemymeds.org.          This list is accurate as of: 6/7/17 11:59 PM.  Always use your most recent med list.                   Brand Name Dispense Instructions for use    capecitabine 500 MG tablet CHEMO    XELODA "    168 tablet    Take 3 tablets (1,500 mg) by mouth 2 times daily for 56 doses Take for 5 days per week Monday-Friday. Do NOT take on Saturday-Sunday. Take with water within 30 minutes after meal.       loperamide 2 MG capsule    IMODIUM    30 capsule    Start with 2 caps (4 mg), then take one cap (2 mg) after each diarrheal stool as needed. Do not use more than 8 caps (16 mg) per day.       LORazepam 0.5 MG tablet    ATIVAN    30 tablet    Take 1 tablet (0.5 mg) by mouth every 4 hours as needed (Anxiety, Nausea/Vomiting or Sleep)       ondansetron 8 MG tablet    ZOFRAN    10 tablet    Take 1 tablet (8 mg) by mouth every 8 hours as needed (Nausea/Vomiting)       prochlorperazine 10 MG tablet    COMPAZINE    30 tablet    Take 1 tablet (10 mg) by mouth every 6 hours as needed (Nausea/Vomiting)       TYLENOL PO      Take 500 mg by mouth every 6 hours as needed Reported on 4/12/2017

## 2017-06-07 NOTE — NURSING NOTE
"Oncology Rooming Note    June 7, 2017 7:57 AM   Fabienne Lynch is a 47 year old female who presents for:    Chief Complaint   Patient presents with     Port Draw     port accessed and labs drawn by rn.  vs taken.     Initial Vitals: /74 (BP Location: Right arm, Patient Position: Chair, Cuff Size: Adult Regular)  Pulse 72  Temp 98.1  F (36.7  C) (Oral)  Resp 16  Ht 1.632 m (5' 4.25\")  Wt 58.2 kg (128 lb 6.4 oz)  SpO2 100%  BMI 21.87 kg/m2 Estimated body mass index is 21.87 kg/(m^2) as calculated from the following:    Height as of this encounter: 1.632 m (5' 4.25\").    Weight as of this encounter: 58.2 kg (128 lb 6.4 oz). Body surface area is 1.62 meters squared.  No Pain (0) Comment: Data Unavailable   No LMP recorded. Patient has had a hysterectomy.  Allergies reviewed: Yes  Medications reviewed: Yes    Medications: Medication refills not needed today.  Pharmacy name entered into [employer]:    [id number] [place], [place] - [address]  [facility] [place] [facility] [place] [facility] [address] [facility] [address] [place] [facility] [place] [place] - [address] [place] [address]  [facility] - [place], [place]    Clinical concerns: none dr. garcia was notified.    5 minutes for nursing intake (face to face time)     Margoth Kovacs CMA              "

## 2017-06-07 NOTE — MR AVS SNAPSHOT
After Visit Summary   6/7/2017    Fabienne Lynch    MRN: [id number]           Patient Information     Date Of Birth          1970        Visit Information        Provider Department      6/7/2017 10:00 AM UC 21 ATC;  ONCOLOGY INFUSION [facility]        Today's Diagnoses     Rectal cancer (H)    -  1      [facility] Main Line: [phone]    Call triage nurse with chills and/or temperature greater than or equal to 100.4, uncontrolled nausea/vomiting, diarrhea, constipation, dizziness, shortness of breath, chest pain, bleeding, unexplained bruising, or any new/concerning symptoms, questions/concerns.   If you are having any concerning symptoms or wish to speak to a provider before your next infusion visit, please call your care coordinator or triage to notify them so we can adequately serve you.   Triage Nurse Line: [phone]    If after hours, weekends, or holidays, call main hospital  and ask for Oncology doctor on call @ [phone]               June 2017 Sunday Monday Tuesday Wednesday Thursday Friday Saturday                       1     2     3       4     5     6     7     [facility]ON[facility] LAB DRAW    7:15 AM   (15 min.)    MASONIC LAB DRAW   [facility] Lab Draw     [facility] RETURN    7:30 AM   (30 min.)   Xochitl Christine MD   [facility] ONC INFUSION 180   10:00 AM   (180 min.)    ONCOLOGY INFUSION   [facility] 8     9     10       11     12     13     14     15     16     17       18     19     20     21     P MASONIC LAB DRAW    6:30 AM   (15 min.)    MASONIC LAB DRAW   [facility] Lab Draw     [facility] RETURN    6:45 AM   (50 min.)   Azul Mansfield PA-C   [facility] ONC INFUSION 180    8:00 AM   (180 min.)    ONCOLOGY INFUSION   [facility] 22     23     24       25     26     27     28     29     30                      July 2017 Sunday Monday Tuesday Wednesday Thursday Friday Saturday                                 1       2     3     4     5     6     7     8       9     10     11     12     13     14     15       16     17     18     19     20     21     22       23     24     25     26     27     28     29       30     31                                           Lab Results:  Recent Results (from the past 12 hour(s))   CBC with platelets differential    Collection Time: 06/07/17  7:39 AM   Result Value Ref Range    WBC 2.3 (L) 4.0 - 11.0 10e9/L    RBC Count 3.73 (L) 3.8 - 5.2 10e12/L    Hemoglobin 12.5 11.7 - 15.7 g/dL    Hematocrit 36.7 35.0 - 47.0 %    MCV 98 78 - 100 fl    MCH 33.5 (H) 26.5 - 33.0 pg    MCHC 34.1 31.5 - 36.5 g/dL    RDW 13.7 10.0 - 15.0 %    Platelet Count 102 (L) 150 - 450 10e9/L    Diff Method Automated Method     % Neutrophils 63.2 %    % Lymphocytes 19.5 %    % Monocytes 13.0 %    % Eosinophils 3.0 %    % Basophils 0.9 %    % Immature Granulocytes 0.4 %    Nucleated RBCs 0 0 /100    Absolute Neutrophil 1.5 (L) 1.6 - 8.3 10e9/L    Absolute Lymphocytes 0.5 (L) 0.8 - 5.3 10e9/L    Absolute Monocytes 0.3 0.0 - 1.3 10e9/L    Absolute Eosinophils 0.1 0.0 - 0.7 10e9/L    Absolute Basophils 0.0 0.0 - 0.2 10e9/L    Abs Immature Granulocytes 0.0 0 - 0.4 10e9/L    Absolute Nucleated RBC 0.0    Bilirubin  total    Collection Time: 06/07/17  7:39 AM   Result Value Ref Range    Bilirubin Total 1.7 (H) 0.2 - 1.3 mg/dL   Protein qualitative urine    Collection Time: 06/07/17  7:39 AM   Result Value Ref Range    Protein Albumin Urine Negative NEG mg/dL             Follow-ups after your visit        Your next 10 appointments already scheduled     Jun 21, 2017  6:30 AM CDT   Masonic Lab Draw with  MASONIC LAB DRAW   [facility] Masonic Lab Draw ([facility])    [address] [address] [phone]            Jun 21, 2017  7:00 AM CDT   (Arrive by 6:45  AM)   Return Visit with Azul Mansfield PA-C   [facility] ([facility])    [address] [facility] [address] [phone]            Jun 21, 2017  8:00 AM CDT   Infusion 180 with UC ONCOLOGY INFUSION, UC 18 ATC   [facility] ([facility])    [address]  [address] [facility] [address] [phone]              Who to contact     If you have questions or need follow up information about today's clinic visit or your schedule please contact [facility] directly at [phone].  Normal or non-critical lab and imaging results will be communicated to you by Open Englishhart, letter or phone within 4 business days after the clinic has received the results. If you do not hear from us within 7 days, please contact the clinic through Open Englishhart or phone. If you have a critical or abnormal lab result, we will notify you by phone as soon as possible.  Submit refill requests through Lala or call your pharmacy and they will forward the refill request to us. Please allow 3 business days for your refill to be completed.          Additional Information About Your Visit        Open EnglishharStorone Information     Lala gives you secure access to your electronic health record. If you see a primary care provider, you can also send messages to your care team and make appointments. If you have questions, please call your primary care clinic.  If you do not have a primary care provider, please call [phone] and they will assist you.        Care EveryWhere ID     This is your Care EveryWhere ID. This could be used by other organizations to access your [facility] medical records  [id number]         Blood Pressure from Last 3 Encounters:   06/07/17 102/74   05/17/17 103/72   04/26/17 110/73    Weight from Last 3 Encounters:   06/07/17 58.2 kg (128 lb 6.4 oz)   05/17/17 58.5 kg (129 lb)   04/26/17 59.6 kg  (131 lb 6.4 oz)              We Performed the Following     Bilirubin  total     CBC with platelets differential     Protein qualitative urine        Primary Care Provider Office Phone # Fax #    Golden Quinteros [phone]       [facility] [place] [facility] [address]  [place] [address]        Thank you!     Thank you for choosing [facility]  for your care. Our goal is always to provide you with excellent care. Hearing back from our patients is one way we can continue to improve our services. Please take a few minutes to complete the written survey that you may receive in the mail after your visit with us. Thank you!             Your Updated Medication List - Protect others around you: Learn how to safely use, store and throw away your medicines at www.disposemymeds.org.          This list is accurate as of: 6/7/17 10:56 AM.  Always use your most recent med list.                   Brand Name Dispense Instructions for use    capecitabine 500 MG tablet CHEMO    XELODA    168 tablet    Take 3 tablets (1,500 mg) by mouth 2 times daily for 56 doses Take for 5 days per week Monday-Friday. Do NOT take on Saturday-Sunday. Take with water within 30 minutes after meal.       loperamide 2 MG capsule    IMODIUM    30 capsule    Start with 2 caps (4 mg), then take one cap (2 mg) after each diarrheal stool as needed. Do not use more than 8 caps (16 mg) per day.       LORazepam 0.5 MG tablet    ATIVAN    30 tablet    Take 1 tablet (0.5 mg) by mouth every 4 hours as needed (Anxiety, Nausea/Vomiting or Sleep)       ondansetron 8 MG tablet    ZOFRAN    10 tablet    Take 1 tablet (8 mg) by mouth every 8 hours as needed (Nausea/Vomiting)       prochlorperazine 10 MG tablet    COMPAZINE    30 tablet    Take 1 tablet (10 mg) by mouth every 6 hours as needed (Nausea/Vomiting)       TYLENOL PO      Take 500 mg by mouth every 6 hours as needed Reported on 4/12/2017

## 2017-06-07 NOTE — LETTER
6/7/2017     RE: Fabienne Lynch  [address] [facility] [address]     Dear Colleague,    Thank you for referring your patient, Fabienne Lynch, to the [facility]. Please see a copy of my visit note below.    HISTORY OF PRESENT ILLNESS:  This is a followup visit for a diagnosis of metastatic rectal cancer.  Kindly refer to the details of the patient's presentation and treatment so far in my previous notes.  Briefly, the patient was diagnosed with an obstructing rectal mass.  She was initiated on FOLFOX and then bevacizumab was added later.  She had a very good response to the metastatic disease in her liver and also at the primary site.  She was switched to 5-FU and Avastin maintenance, but unfortunately at the time of followup scan she was found to have progressive disease at the site of the original tumor.  She underwent chemoradiation for the rectal lesion and then took a break because of multiple family events and obligations.  At the time of initiation of the chemotherapy with 5-FU, Avastin and irinotecan, she had mildly progressive disease.      Interval history:   She received cycle 1 of FOLFIRI and Avastin 3 weeks ago and immediately noticed that she had more nausea than before.  She, in fact, ended up having an episode of vomiting 2 days later, but after that she felt well.  Also, she has noticed that she is losing quite a bit of hair with this chemotherapy; she had no hair loss with FOLFOX and Avastin in the past.  She does report that the hand-foot syndrome that she was dealing with and was pretty bad with Xeloda has improved quite a bit to the point that all the cracking and pigmentation have resolved.  She does have ongoing bowel emergencies after radiation treatment and irinotecan chemo has maybe accentuated it; she is not sure.  Otherwise, she denies fevers, chills, coughing, shortness of breath or abdominal pain.  She has not had any bleeding complications.  The bleeding that  "she was having per rectum after finishing up with radiation has resolved as well.       PAST MEDICAL HISTORY:  Unchanged.       PAST SURGICAL HISTORY:  Unchanged.      FAMILY HISTORY:  Unchanged.       SOCIAL HISTORY:  Unchanged.  Her son, Ranjith, has gone to [place] for  boot camp.      PHYSICAL EXAMINATION:   VITAL SIGNS:   /74 (BP Location: Right arm, Patient Position: Chair, Cuff Size: Adult Regular)  Pulse 72  Temp 98.1  F (36.7  C) (Oral)  Resp 16  Ht 1.632 m (5' 4.25\")  Wt 58.2 kg (128 lb 6.4 oz)  SpO2 100%  BMI 21.87 kg/m2    GENERAL:  Alert and oriented x3, no apparent distress.   HEENT:  Moist mucous membranes.  No mucositis.   CARDIOVASCULAR:  S1, S2 clear.   RESPIRATORY:  Clear to auscultation.  Port site looks good.     SKIN:  All the cracking and peeling has resolved pretty much at this point.   LOWER EXTREMITIES:  No pedal edema.   ABDOMEN:  Nontender to palpation.      LABORATORY DATA:  Laboratory exam is remarkable for a mildly elevated bilirubin of 1.7.  This is better than her last count at 1.9 when we had done dose reduction for irinotecan by one dose level.  Her white count is low today at 2.3 total with an absolute neutrophil count of 1.5.  Her hemoglobin is stable at 12.5 and platelets are 102,000.      ASSESSMENT AND PLAN:  This is a patient with metastatic rectal cancer, currently on FOLFIRI plus Avastin.  Today her counts are lower despite this being week 3.  I discussed this with the patient.  Technically she meets parameters to do the treatment, but I would like to go down further in dose for irinotecan to 50% dose for her today and have her follow up in 2 weeks' time.  We did discuss in great detail possible signs of infection and how if she has a fever greater than 100.4 she needs to call and report to the ER immediately because she would probably be somewhere around 500 for her absolute neutrophil count and at that point she would need IV antibiotics.  She and her "  expressed understanding.  They were wondering if any new drugs have been approved for treatment of her kind of cancer.  I did go over the recent press released from [employer] with them.  None of the drugs are actually pertinent to her case as of right now.  They expressed understanding and disappointment.     I spent 35 minutes in the care of this patient >50% of which was spent in coordinating and counseling.      Xochitl Christine   of Medicine   Hematology and medical Oncology   [facility]

## 2017-06-07 NOTE — PROGRESS NOTES
Nausea for first few days and then had an episode of vomiting on day 2   Continues to have stool emergencies.

## 2017-06-09 ENCOUNTER — HOME INFUSION (PRE-WILLOW HOME INFUSION) (OUTPATIENT)
Dept: PHARMACY | Facility: CLINIC | Age: 47
End: 2017-06-09

## 2017-06-20 NOTE — PROGRESS NOTES
This is a snapshot of the patient's [facility] medical record. For complete information or questions call [phone] or In [facility],  Home Infusion ([id number]).  [facility] Number:  [id number]

## 2017-06-21 ENCOUNTER — ONCOLOGY VISIT (OUTPATIENT)
Dept: ONCOLOGY | Facility: CLINIC | Age: 47
End: 2017-06-21
Attending: PHYSICIAN ASSISTANT
Payer: COMMERCIAL

## 2017-06-21 ENCOUNTER — HOME INFUSION (PRE-WILLOW HOME INFUSION) (OUTPATIENT)
Dept: PHARMACY | Facility: CLINIC | Age: 47
End: 2017-06-21

## 2017-06-21 VITALS
BODY MASS INDEX: 21.87 KG/M2 | OXYGEN SATURATION: 100 % | TEMPERATURE: 97.4 F | DIASTOLIC BLOOD PRESSURE: 72 MMHG | HEIGHT: 64 IN | RESPIRATION RATE: 16 BRPM | WEIGHT: 128.1 LBS | SYSTOLIC BLOOD PRESSURE: 105 MMHG | HEART RATE: 64 BPM

## 2017-06-21 DIAGNOSIS — C20 RECTAL CANCER (H): Primary | ICD-10-CM

## 2017-06-21 LAB
ALBUMIN SERPL-MCNC: 3.6 G/DL (ref 3.4–5)
ALBUMIN UR-MCNC: NEGATIVE MG/DL
ALP SERPL-CCNC: 89 U/L (ref 40–150)
ALT SERPL W P-5'-P-CCNC: 29 U/L (ref 0–50)
AST SERPL W P-5'-P-CCNC: 30 U/L (ref 0–45)
BASOPHILS # BLD AUTO: 0 10E9/L (ref 0–0.2)
BASOPHILS NFR BLD AUTO: 0.5 %
BILIRUB DIRECT SERPL-MCNC: 0.4 MG/DL (ref 0–0.2)
BILIRUB SERPL-MCNC: 2 MG/DL (ref 0.2–1.3)
BILIRUB SERPL-MCNC: 2 MG/DL (ref 0.2–1.3)
DIFFERENTIAL METHOD BLD: ABNORMAL
EOSINOPHIL # BLD AUTO: 0.1 10E9/L (ref 0–0.7)
EOSINOPHIL NFR BLD AUTO: 3.4 %
ERYTHROCYTE [DISTWIDTH] IN BLOOD BY AUTOMATED COUNT: 13.5 % (ref 10–15)
HCT VFR BLD AUTO: 33.6 % (ref 35–47)
HGB BLD-MCNC: 11.6 G/DL (ref 11.7–15.7)
IMM GRANULOCYTES # BLD: 0 10E9/L (ref 0–0.4)
IMM GRANULOCYTES NFR BLD: 0 %
LYMPHOCYTES # BLD AUTO: 0.4 10E9/L (ref 0.8–5.3)
LYMPHOCYTES NFR BLD AUTO: 20.2 %
MCH RBC QN AUTO: 33.7 PG (ref 26.5–33)
MCHC RBC AUTO-ENTMCNC: 34.5 G/DL (ref 31.5–36.5)
MCV RBC AUTO: 98 FL (ref 78–100)
MONOCYTES # BLD AUTO: 0.2 10E9/L (ref 0–1.3)
MONOCYTES NFR BLD AUTO: 11.3 %
NEUTROPHILS # BLD AUTO: 1.3 10E9/L (ref 1.6–8.3)
NEUTROPHILS NFR BLD AUTO: 64.6 %
NRBC # BLD AUTO: 0 10*3/UL
NRBC BLD AUTO-RTO: 0 /100
PLATELET # BLD AUTO: 82 10E9/L (ref 150–450)
PROT SERPL-MCNC: 6.8 G/DL (ref 6.8–8.8)
RBC # BLD AUTO: 3.44 10E12/L (ref 3.8–5.2)
WBC # BLD AUTO: 2 10E9/L (ref 4–11)

## 2017-06-21 PROCEDURE — 96417 CHEMO IV INFUS EACH ADDL SEQ: CPT

## 2017-06-21 PROCEDURE — 99215 OFFICE O/P EST HI 40 MIN: CPT | Mod: ZP | Performed by: PHYSICIAN ASSISTANT

## 2017-06-21 PROCEDURE — 96367 TX/PROPH/DG ADDL SEQ IV INF: CPT

## 2017-06-21 PROCEDURE — 25000125 ZZHC RX 250: Mod: ZF | Performed by: PHYSICIAN ASSISTANT

## 2017-06-21 PROCEDURE — 80076 HEPATIC FUNCTION PANEL: CPT | Performed by: NURSE PRACTITIONER

## 2017-06-21 PROCEDURE — 25000125 ZZHC RX 250: Mod: ZF

## 2017-06-21 PROCEDURE — 96416 CHEMO PROLONG INFUSE W/PUMP: CPT

## 2017-06-21 PROCEDURE — 36415 COLL VENOUS BLD VENIPUNCTURE: CPT | Performed by: PHYSICIAN ASSISTANT

## 2017-06-21 PROCEDURE — 25000128 H RX IP 250 OP 636: Mod: ZF | Performed by: PHYSICIAN ASSISTANT

## 2017-06-21 PROCEDURE — 99212 OFFICE O/P EST SF 10 MIN: CPT | Mod: ZF

## 2017-06-21 PROCEDURE — 96413 CHEMO IV INFUSION 1 HR: CPT

## 2017-06-21 PROCEDURE — 25000128 H RX IP 250 OP 636: Mod: ZF

## 2017-06-21 PROCEDURE — 82248 BILIRUBIN DIRECT: CPT | Performed by: PHYSICIAN ASSISTANT

## 2017-06-21 PROCEDURE — 85025 COMPLETE CBC W/AUTO DIFF WBC: CPT | Performed by: PHYSICIAN ASSISTANT

## 2017-06-21 PROCEDURE — 96375 TX/PRO/DX INJ NEW DRUG ADDON: CPT

## 2017-06-21 PROCEDURE — 81003 URINALYSIS AUTO W/O SCOPE: CPT | Performed by: PHYSICIAN ASSISTANT

## 2017-06-21 PROCEDURE — 25000128 H RX IP 250 OP 636: Mod: ZF | Performed by: NURSE PRACTITIONER

## 2017-06-21 RX ORDER — METHYLPREDNISOLONE SODIUM SUCCINATE 125 MG/2ML
125 INJECTION, POWDER, LYOPHILIZED, FOR SOLUTION INTRAMUSCULAR; INTRAVENOUS
Status: CANCELLED
Start: 2017-06-21

## 2017-06-21 RX ORDER — LORAZEPAM 2 MG/ML
0.5 INJECTION INTRAMUSCULAR EVERY 4 HOURS PRN
Status: CANCELLED
Start: 2017-06-21

## 2017-06-21 RX ORDER — MEPERIDINE HYDROCHLORIDE 25 MG/ML
25 INJECTION INTRAMUSCULAR; INTRAVENOUS; SUBCUTANEOUS EVERY 30 MIN PRN
Status: CANCELLED | OUTPATIENT
Start: 2017-06-21

## 2017-06-21 RX ORDER — DIPHENHYDRAMINE HYDROCHLORIDE 50 MG/ML
50 INJECTION INTRAMUSCULAR; INTRAVENOUS
Status: CANCELLED
Start: 2017-06-21

## 2017-06-21 RX ORDER — SODIUM CHLORIDE 9 MG/ML
1000 INJECTION, SOLUTION INTRAVENOUS CONTINUOUS PRN
Status: CANCELLED
Start: 2017-06-21

## 2017-06-21 RX ORDER — EPINEPHRINE 0.3 MG/.3ML
0.3 INJECTION SUBCUTANEOUS EVERY 5 MIN PRN
Status: CANCELLED | OUTPATIENT
Start: 2017-06-21

## 2017-06-21 RX ORDER — ALBUTEROL SULFATE 90 UG/1
1-2 AEROSOL, METERED RESPIRATORY (INHALATION)
Status: CANCELLED
Start: 2017-06-21

## 2017-06-21 RX ORDER — HEPARIN SODIUM (PORCINE) LOCK FLUSH IV SOLN 100 UNIT/ML 100 UNIT/ML
5 SOLUTION INTRAVENOUS ONCE
Status: COMPLETED | OUTPATIENT
Start: 2017-06-21 | End: 2017-06-21

## 2017-06-21 RX ORDER — ALBUTEROL SULFATE 0.83 MG/ML
2.5 SOLUTION RESPIRATORY (INHALATION)
Status: CANCELLED | OUTPATIENT
Start: 2017-06-21

## 2017-06-21 RX ADMIN — SODIUM CHLORIDE 250 ML: 9 INJECTION, SOLUTION INTRAVENOUS at 10:11

## 2017-06-21 RX ADMIN — SODIUM CHLORIDE, PRESERVATIVE FREE 5 ML: 5 INJECTION INTRAVENOUS at 07:11

## 2017-06-21 RX ADMIN — BEVACIZUMAB 300 MG: 100 INJECTION, SOLUTION INTRAVENOUS at 10:52

## 2017-06-21 RX ADMIN — DEXAMETHASONE SODIUM PHOSPHATE: 10 INJECTION, SOLUTION INTRAMUSCULAR; INTRAVENOUS at 10:11

## 2017-06-21 RX ADMIN — ATROPINE SULFATE 0.4 MG: 0.4 INJECTION, SOLUTION INTRAMUSCULAR; INTRAVENOUS; SUBCUTANEOUS at 11:32

## 2017-06-21 RX ADMIN — SODIUM CHLORIDE 150 MG: 9 INJECTION, SOLUTION INTRAVENOUS at 10:25

## 2017-06-21 RX ADMIN — DEXTROSE MONOHYDRATE 150 MG: 50 INJECTION, SOLUTION INTRAVENOUS at 11:35

## 2017-06-21 ASSESSMENT — PAIN SCALES - GENERAL: PAINLEVEL: NO PAIN (0)

## 2017-06-21 NOTE — MR AVS SNAPSHOT
After Visit Summary   6/21/2017    Fabienne Lynch    MRN: [id number]           Patient Information     Date Of Birth          1970        Visit Information        Provider Department      6/21/2017 7:00 AM Azul Mansfield PA-C [facility]        Today's Diagnoses     Rectal cancer (H)    -  1       Follow-ups after your visit        Your next 10 appointments already scheduled     Jul 06, 2017  8:00 AM CDT   [facility] Lab Draw with  [facility] LAB DRAW   [facility] ([facility])    [address] [place] [id number]   [phone]            Jul 06, 2017  8:40 AM CDT   (Arrive by 8:25 AM)   Return Visit with Becky Interiano PA-C   [facility] ([facility])    [address] [place] [id number]   [phone]            Jul 06, 2017  9:30 AM CDT   Infusion 180 with UC ONCOLOGY INFUSION, UC 30 ATC   [facility] ([facility])    [address] [place] [id number]   [phone]            Jul 19, 2017  7:00 AM CDT   [facility] Lab Draw with UC MASONIC LAB DRAW   [facility] ([facility])    [address] [place] [id number]   [phone]            Jul 19, 2017  7:20 AM CDT   (Arrive by 7:05 AM)   CT CHEST/ABDOMEN/PELVIS W CONTRAST with UCCT1   [facility] ([facility])    [address] [place] [phone]   [phone]           Please bring any scans or X-rays taken at other hospitals, if similar tests were done. Also bring a list of your medicines, including vitamins, minerals and over-the-counter drugs. It is safest to leave personal items at home.  Be sure to tell your doctor:   If you have any allergies.   If there s any chance you are pregnant.   If you  are breastfeeding.   If you have any special needs.  You may have contrast for this exam. To prepare:   Do not eat or drink for 2 hours before your exam. If you need to take medicine, you may take it with small sips of water. (We may ask you to take liquid medicine as well.)   The day before your exam, drink extra fluids at least six 8-ounce glasses (unless your doctor tells you to restrict your fluids).  Patients over 70 or patients with diabetes or kidney problems:   If you haven t had a blood test (creatinine test) within the last 30 days, go to your clinic or Diagnostic Imaging Department for this test.  If you have diabetes:   If your kidney function is normal, continue taking your metformin (Avandamet, Glucophage, Glucovance, Metaglip) on the day of your exam.   If your kidney function is abnormal, wait 48 hours before restarting this medicine.  You will have oral contrast for this exam:   You will drink the contrast at home. Get this from your clinic or Diagnostic Imaging Department. Please follow the directions given.  Please wear loose clothing, such as a sweat suit or jogging clothes. Avoid snaps, zippers and other metal. We may ask you to undress and put on a hospital gown.  If you have any questions, please call the Imaging Department where you will have your exam.            Jul 19, 2017  9:00 AM CDT   Infusion 180 with UC ONCOLOGY INFUSION,  24 ATC   [facility] ([facility])    [address] [place] [id number] [phone]            Jul 24, 2017  3:00 PM CDT   (Arrive by 2:45 PM)   Return Visit with Xochitl Christine MD   [facility] ([facility])    [address] [place] [id number] [phone]              Future tests that were ordered for you today     Open Future Orders        Priority Expected Expires Ordered    CT Chest/Abdomen/Pelvis w Contrast Routine  "7/14/2017 7/28/2017 6/21/2017    CBC with platelets differential Routine 7/14/2017 7/28/2017 6/21/2017    Comprehensive metabolic panel Routine 7/14/2017 7/28/2017 6/21/2017            Who to contact     If you have questions or need follow up information about today's clinic visit or your schedule please contact [facility] directly at [phone].  Normal or non-critical lab and imaging results will be communicated to you by [employer], letter or phone within 4 business days after the clinic has received the results. If you do not hear from us within 7 days, please contact the clinic through [facility] or phone. If you have a critical or abnormal lab result, we will notify you by phone as soon as possible.  Submit refill requests through [facility] or call your pharmacy and they will forward the refill request to us. Please allow 3 business days for your refill to be completed.          Additional Information About Your Visit        [facility] Information     [facility] gives you secure access to your electronic health record. If you see a primary care provider, you can also send messages to your care team and make appointments. If you have questions, please call your primary care clinic.  If you do not have a primary care provider, please call [phone] and they will assist you.        Care EveryWhere ID     This is your Care EveryWhere ID. This could be used by other organizations to access your [place] medical records  [id number]        Your Vitals Were     Pulse Temperature Respirations Height Pulse Oximetry BMI (Body Mass Index)    64 97.4  F (36.3  C) (Oral) 16 1.632 m (5' 4.25\") 100% 21.82 kg/m2       Blood Pressure from Last 3 Encounters:   06/21/17 105/72   06/07/17 102/74   05/17/17 103/72    Weight from Last 3 Encounters:   06/21/17 58.1 kg (128 lb 1.6 oz)   06/07/17 58.2 kg (128 lb 6.4 oz)   05/17/17 58.5 kg (129 lb)              We Performed the Following     Hepatic panel        Primary " Care Provider Office Phone # Fax #    Golden Quinteros [phone]       [facility] [address]  [facility] [address]        Equal Access to Services     FANNY SCOTT : Surya kitty schwarz cory Soana, waaxda luqadaha, qaybta kaalmada timda, gene mann jaredzoey fan lamiladynicole meeks. So [facility] [phone].    ATENCIÓN: Si habla español, tiene a sewell disposición servicios gratuitos de asistencia lingüística. Llame al [phone].    We comply with applicable federal civil rights laws and [place] laws. We do not discriminate on the basis of race, color, national origin, age, disability sex, sexual orientation or gender identity.            Thank you!     Thank you for choosing [facility]  for your care. Our goal is always to provide you with excellent care. Hearing back from our patients is one way we can continue to improve our services. Please take a few minutes to complete the written survey that you may receive in the mail after your visit with us. Thank you!             Your Updated Medication List - Protect others around you: Learn how to safely use, store and throw away your medicines at www.disposemymeds.org.          This list is accurate as of: 6/21/17 11:59 PM.  Always use your most recent med list.                   Brand Name Dispense Instructions for use Diagnosis    loperamide 2 MG capsule    IMODIUM    30 capsule    Start with 2 caps (4 mg), then take one cap (2 mg) after each diarrheal stool as needed. Do not use more than 8 caps (16 mg) per day.    Rectal cancer (H)       LORazepam 0.5 MG tablet    ATIVAN    30 tablet    Take 1 tablet (0.5 mg) by mouth every 4 hours as needed (Anxiety, Nausea/Vomiting or Sleep)    Rectal cancer (H)       ondansetron 8 MG tablet    ZOFRAN    10 tablet    Take 1 tablet (8 mg) by mouth every 8 hours as needed (Nausea/Vomiting)    Rectal cancer (H)       prochlorperazine 10 MG tablet    COMPAZINE    30 tablet    Take 1 tablet  (10 mg) by mouth every 6 hours as needed (Nausea/Vomiting)    Rectal cancer (H)       TYLENOL PO      Take 500 mg by mouth every 6 hours as needed Reported on 4/12/2017

## 2017-06-21 NOTE — NURSING NOTE
Chief Complaint   Patient presents with     Oncology Clinic Visit     Rectal Ca- F/U     Blood Draw     Labs drawn by RN from port. VS taken.      Port accessed by RN with 20g 3/4in Power Port needle. Labs drawn, port flushed with NS and Heparin.   Meredith Sroiano RN

## 2017-06-21 NOTE — PROGRESS NOTES
Infusion Nursing Note:  Pt presents today for C3 D1 Avastin/Irinotecan/Fluorouracil home infusion.     present during visit today: Not Applicable.  Patient seen by LUIS Saleem prior to infusion.    Lab Results   Component Value Date    HGB 11.6 06/21/2017     Lab Results   Component Value Date    WBC 2.0 06/21/2017      Lab Results   Component Value Date    ANEU 1.3 06/21/2017     Lab Results   Component Value Date    PLT 82 06/21/2017      Lab Results   Component Value Date     05/17/2017                   Lab Results   Component Value Date    POTASSIUM 4.1 05/17/2017           No results found for: MAG         Lab Results   Component Value Date    CR 0.54 05/17/2017                   Lab Results   Component Value Date    ANDRZEJ 9.4 05/17/2017                Lab Results   Component Value Date    BILITOTAL 2.0 06/21/2017    BILITOTAL 2.0 06/21/2017           Lab Results   Component Value Date    ALBUMIN 3.6 06/21/2017                    Lab Results   Component Value Date    ALT 29 06/21/2017           Lab Results   Component Value Date    AST 30 06/21/2017     UA RESULTS:  Recent Labs   Lab Test  06/21/17   0720   PROTEIN  Negative       BP: 105/72     Results reviewed, labs MET treatment parameters, ok to proceed with treatment.    Note: N/A.  Proceed with treatment.    Intravenous Access:  Implanted Port.    (+) Blood return from port noted at beginning of infusions and before hooking up continuous infusion. Tolerating infusions without incident.  Fluorouracil home infusion hooked up to port at 1310  at 5.2 ml/hr x46 hours. [facility] aware to disconnect pt's pump Friday, 6/23, at 1130. Pump connection double checked with Kalee VILLAREAL that clamps are taped open. Capillary element taped to chest. Air filter hole noted to not be blocked. Pt aware to keep the infusor out of light d/t light sensitivity and avoiding extreme cold/hot temps to ensure pump's rate does not change. Pt aware of disconnect  date/time.     Discharge Plan:   Patient declined prescription refills.  Discharge instructions reviewed with: Patient.  Patient and/or family verbalized understanding of discharge instructions and all questions answered.  Copy of AVS reviewed with patient and/or family.  Pt will return 7/6/20174 for next provider and infusion appts.

## 2017-06-21 NOTE — PROGRESS NOTES
HEMATOLOGY/ONCOLOGY PROGRESS NOTE  Jun 21, 2017    REASON FOR VISIT: follow-up of rectal carcinoma with mets to liver    DIAGNOSIS:   Fabienne Lynch is a 47 year old female with rectal carcinoma with mets to liver. Please refer to Dr. Christine's notes for full history. Summarized briefly here, Fabienne was diagnosed with an obstructing rectal mass.  She was initiated on FOLFOX and then bevacizumab was added later. She had a very good response to the metastatic disease in her liver and also at the primary site. She was switched to 5-FU and Avastin maintenance, but unfortunately at the time of followup scan she was found to have progressive disease at the site of the original tumor.  She underwent chemoradiation for the rectal lesion and then took a break because of multiple family events and obligations.  At the time of initiation of the chemotherapy with 5-FU, irinotecan,(FOLFIRI) and bevacizumab she had mildly progressive disease. Today she is here for C3.     INTERVAL HISTORY:   Fabienne is here today along with her , Jeff. Fabienne has been having some difficulty with nausea since starting the FOLFIRI and bevacizumab. She has been having nausea the night of chemotherapy, all day tomorrow and Friday. Saturday she has been feeling much better and no nausea after that. She has tried using the Zofran for nausea but she developed a headache and constipation so she hasn't tried it again. She has not tried compazine. She feels in the past, Emend has worked well for her nausea with chemotherapy. She doesn't eat much on the days of nausea. Eating returns to normal after Saturday. She reports that since starting this treatment the HFS has slowly disappeared. Also, her neuropathy has pretty much resolved. Continues to have some tingling in her fingertips.      Otherwise, ROS negative for: fever, chills, sweats, weight loss, weight gain, appetite change, rashes, change in vision or hearing, shortness of breath, cough, chest pain,  "abdominal pain, nausea, vomiting, diarrhea, weakness, easy bleeding or bruising, lymphadenopathy, or bone pain.    Current Outpatient Prescriptions   Medication Sig Dispense Refill     LORazepam (ATIVAN) 0.5 MG tablet Take 1 tablet (0.5 mg) by mouth every 4 hours as needed (Anxiety, Nausea/Vomiting or Sleep) 30 tablet 2     prochlorperazine (COMPAZINE) 10 MG tablet Take 1 tablet (10 mg) by mouth every 6 hours as needed (Nausea/Vomiting) (Patient not taking: Reported on 6/7/2017) 30 tablet 2     ondansetron (ZOFRAN) 8 MG tablet Take 1 tablet (8 mg) by mouth every 8 hours as needed (Nausea/Vomiting) 10 tablet 2     loperamide (IMODIUM) 2 MG capsule Start with 2 caps (4 mg), then take one cap (2 mg) after each diarrheal stool as needed. Do not use more than 8 caps (16 mg) per day. (Patient not taking: Reported on 6/7/2017) 30 capsule 0     capecitabine (XELODA) 500 MG tablet CHEMO Take 3 tablets (1,500 mg) by mouth 2 times daily for 56 doses Take for 5 days per week Monday-Friday. Do NOT take on Saturday-Sunday. Take with water within 30 minutes after meal. (Patient not taking: Reported on 3/29/2017) 168 tablet 0     Acetaminophen (TYLENOL PO) Take 500 mg by mouth every 6 hours as needed Reported on 4/12/2017          Allergies   Allergen Reactions     Nitrous Oxide Nausea and Vomiting     PHYSICAL EXAMINATION  /72 (BP Location: Right arm, Patient Position: Chair, Cuff Size: Adult Regular)  Pulse 64  Temp 97.4  F (36.3  C) (Oral)  Resp 16  Ht 1.632 m (5' 4.25\")  Wt 58.1 kg (128 lb 1.6 oz)  SpO2 100%  BMI 21.82 kg/m2  Constitutional: Alert, oriented female in no visible distress.  Eyes: PERRL. Anicteric sclerae.  ENT/Mouth: OM moist and pink without lesions or thrush.  CV: RRR, no murmurs appreciated.  Resp: CTAB throughout  Abdomen: Soft, non-tender, non-distended. Bowel sounds present. No masses appreciated. No organomegaly noted.  Extremities: No lower extremity edema appreciated.  Skin: Warm, dry. No " bruising or petechiae noted.  Lymph: No cervical or supraclavicular lymphadenopathy appreciated.   Neuro: CN II-XII grossly intact.    LABS:    Results for PHILIP APODACA (MRN [id number]) as of 6/21/2017 16:40   5/17/2017 10:36  First cycle 6/7/2017 07:39    Second cycle 6/21/2017 07:25  Today 6/21/2017 07:25  Today   Sodium 142      Potassium 4.1      Chloride 109      Carbon Dioxide 26      Urea Nitrogen 18      Creatinine 0.54      GFR Estimate >90...      GFR Estimate If Black >90...      Calcium 9.4      Anion Gap 6      Albumin 3.8   3.6   Protein Total 7.2   6.8   Bilirubin Total 1.9 (H) 1.7 (H) 2.0 (H) 2.0 (H)   Alkaline Phosphatase 84   89   ALT 32   29   AST 33   30   Bilirubin Direct    0.4 (H)   Glucose 64 (L)      WBC 4.0 2.3 (L) 2.0 (L)    Hemoglobin 12.5 12.5 11.6 (L)    Hematocrit 36.1 36.7 33.6 (L)    Platelet Count 110 (L) 102 (L) 82 (L)    RBC Count 3.66 (L) 3.73 (L) 3.44 (L)    MCV 99 98 98    MCH 34.2 (H) 33.5 (H) 33.7 (H)    MCHC 34.6 34.1 34.5    RDW 14.3 13.7 13.5    Diff Method Automated Method Automated Method Automated Method    % Neutrophils 75.2 63.2 64.6    % Lymphocytes 13.9 19.5 20.2    % Monocytes 7.3 13.0 11.3    % Eosinophils 3.0 3.0 3.4    % Basophils 0.3 0.9 0.5    % Immature Granulocytes 0.3 0.4 0.0    Nucleated RBCs 0 0 0    Absolute Neutrophil 3.0 1.5 (L) 1.3 (L)    Absolute Lymphocytes 0.6 (L) 0.5 (L) 0.4 (L)    Absolute Monocytes 0.3 0.3 0.2    Absolute Eosinophils 0.1 0.1 0.1    Absolute Basophils 0.0 0.0 0.0    Abs Immature Granulocytes 0.0 0.0 0.0    Absolute Nucleated RBC 0.0 0.0 0.0        IMPRESSION/PLAN:  Philip Apodaca is a 47 year old female with rectal carcinoma with mets to liver. Currently being treated with FOLFIRI and bevacizumab.     Oncology. S/P 2 cycles of FOLFIRI and bevacizumab with expected side effects (nausesa only). Dose of 5-FU was modified eliminating the bolus due to ANC 1.3 per discussion with Dr. Christine. For the irinotecan, we'll continue at 90  mg/m2. Plan is for two more cycles (including today) then we will do CT CAP.   - return 7/6 for labs, AP/PA visit and infusion C4.  - CT CAP on 7/19/17  - 7/24/17 labs infusion and Dr. Christine    Nausea. Known side effect of irinotecan and 5-FU. Will add Emend to her pre-meds today. Suggested trying prochloraperazine and lorazepam for additional support.       Elevated T. Bili. Persistent hyperbilirubinemia modified doses of irinotecan was started with for C1 and again modified for C2. Today hyperbilirubinemia .  Direct bili is 0.4 thus OK to continue with irinotecan. Continuing the irinotecan at 90 mg/m2 due to low counts and dropping the 5-FU bolus.       Anais GIFFORD NP-C OCN  Hematology, Oncology, and Transplant  [facility]  [address] [place] [id number]    I saw patient and performed the ROS and exam myself.  The assessment and plan were mutually discussed and this note was edited to reflect my findings.  Leatha Mansfield PA-C

## 2017-06-21 NOTE — MR AVS SNAPSHOT
After Visit Summary   6/21/2017    Fabienne Lynch    MRN: [id number]           Patient Information     Date Of Birth          1970        Visit Information        Provider Department      6/21/2017 8:00 AM UC 18 ATC;  ONCOLOGY INFUSION [facility]        Today's Diagnoses     Rectal cancer (H)    -  1      Care Instructions    Contact Numbers  [facility] Main Line/After Hours: [phone]  [facility] Triage line: [phone]      Please call the triage or after hours line if you experience a temperature greater than or equal to 100.5, shaking chills, have uncontrolled nausea, vomiting and/or diarrhea, dizziness, shortness of breath, chest pain, bleeding, unexplained bruising, or if you have any other new/concerning symptoms, questions or concerns.      If you are having any concerning symptoms or wish to speak to a provider before your next infusion visit, please call to notify us so we can adequately serve you.     If you need a refill on a narcotic prescription or other medication, please call before your infusion appointment.       [facility] will be at your house Friday, 6/32, at 11:30 am to disconnect your pump.          June 2017 Sunday Monday Tuesday Wednesday Thursday Friday Saturday                       1     2     3       4     5     6     7     [facility] LAB DRAW    7:15 AM   (15 min.)    [facility] LAB DRAW   [facility] Lab Draw     [facility] RETURN    7:30 AM   (30 min.)   Xochitl Christine MD   [facility] ONC INFUSION 180   10:00 AM   (180 min.)    ONCOLOGY INFUSION   [facility] 8     9     10       11     12     13     14     15     16     17       18     19     20     21     [facility] DRAW    6:30 AM   (15 min.)    [facility] LAB DRAW   [facility] Lab Draw     [facility] RETURN    6:45 AM   (50 min.)   Azul Mansfield PA-C   [facility] ONC INFUSION 180    8:00 AM   (180  min.)   UC ONCOLOGY INFUSION   [facility] 22     23     24       25     26     27     28     29     30                     July 2017 Sunday Monday Tuesday Wednesday Thursday Friday Saturday                                 1       2     3     4     5     6     [facility] D[facility]    8:00 AM   (15 min.)    [facility] LAB DRAW   [facility] Lab Draw     [facility] RETURN    8:25 AM   (50 min.)   Becky Interiano PA-C   [facility]     [facility] ONC INFUSION 180    9:30 AM   (180 min.)    ONCOLOGY INFUSION   [facility] 7     8       9     10     11     12     13     14     15       16     17     18     19     [facility] DRAW    7:00 AM   (15 min.)    [facility] LAB DRAW   [facility] Lab Draw     CT CHEST/ABDOMEN/PELVIS W    7:05 AM   (20 min.)   UCCT1   [facility] CT     [facility] ONC INFUSION 180    9:00 AM   (180 min.)    ONCOLOGY INFUSION   [facility] 20     21     22       23     24     [facility] RETURN    2:45 PM   (30 min.)   Xochitl Christine MD   [facility] 25     26     27     28     29       30     31                                           Lab Results:  Recent Results (from the past 12 hour(s))   Protein qualitative urine    Collection Time: 06/21/17  7:20 AM   Result Value Ref Range    Protein Albumin Urine Negative NEG mg/dL   CBC with platelets differential    Collection Time: 06/21/17  7:25 AM   Result Value Ref Range    WBC 2.0 (L) 4.0 - 11.0 10e9/L    RBC Count 3.44 (L) 3.8 - 5.2 10e12/L    Hemoglobin 11.6 (L) 11.7 - 15.7 g/dL    Hematocrit 33.6 (L) 35.0 - 47.0 %    MCV 98 78 - 100 fl    MCH 33.7 (H) 26.5 - 33.0 pg    MCHC 34.5 31.5 - 36.5 g/dL    RDW 13.5 10.0 - 15.0 %    Platelet Count 82 (L) 150 - 450 10e9/L    Diff Method Automated Method     % Neutrophils 64.6 %    % Lymphocytes 20.2 %    % Monocytes 11.3 %    % Eosinophils 3.4 %    % Basophils 0.5 %    % Immature Granulocytes 0.0 %     Nucleated RBCs 0 0 /100    Absolute Neutrophil 1.3 (L) 1.6 - 8.3 10e9/L    Absolute Lymphocytes 0.4 (L) 0.8 - 5.3 10e9/L    Absolute Monocytes 0.2 0.0 - 1.3 10e9/L    Absolute Eosinophils 0.1 0.0 - 0.7 10e9/L    Absolute Basophils 0.0 0.0 - 0.2 10e9/L    Abs Immature Granulocytes 0.0 0 - 0.4 10e9/L    Absolute Nucleated RBC 0.0    Bilirubin  total    Collection Time: 06/21/17  7:25 AM   Result Value Ref Range    Bilirubin Total 2.0 (H) 0.2 - 1.3 mg/dL   Hepatic panel    Collection Time: 06/21/17  7:25 AM   Result Value Ref Range    Bilirubin Direct 0.4 (H) 0.0 - 0.2 mg/dL    Bilirubin Total 2.0 (H) 0.2 - 1.3 mg/dL    Albumin 3.6 3.4 - 5.0 g/dL    Protein Total 6.8 6.8 - 8.8 g/dL    Alkaline Phosphatase 89 40 - 150 U/L    ALT 29 0 - 50 U/L    AST 30 0 - 45 U/L               Follow-ups after your visit        Your next 10 appointments already scheduled     Jul 06, 2017  8:00 AM CDT   [facility] Draw with [facility] DRAW   [facility]    [address] [place] [id number]   [phone]            Jul 06, 2017  8:40 AM CDT   (Arrive by 8:25 AM)   Return Visit with Becky Interiano PA-C   [facility])    [address] [place] [address]   [phone]            Jul 06, 2017  9:30 AM CDT   Infusion 180 with UC ONCOLOGY INFUSION, UC 30 ATC   [facility])    [address] [place] [address]   [phone]            Jul 19, 2017  7:00 AM CDT   [facility] Lab Draw with  [facility] LAB DRAW   [place] [facility] Draw ([facility])    [address] [place] [address]   [phone]            Jul 19, 2017  7:20 AM CDT   (Arrive by 7:05 AM)   CT CHEST/ABDOMEN/PELVIS W CONTRAST with UCCT1   [facility] [place] [facility] ([facility])    [address] [place] [address] [phone]           Please bring any scans or X-rays taken at other hospitals, if similar tests were done. Also bring a list of your medicines, including vitamins, minerals and over-the-counter drugs. It is safest to leave personal items at home.  Be sure to tell your doctor:   If you have any allergies.   If there s any chance you are pregnant.   If you are breastfeeding.   If you have any special needs.  You may have contrast for this exam. To prepare:   Do not eat or drink for 2 hours before your exam. If you need to take medicine, you may take it with small sips of water. (We may ask you to take liquid medicine as well.)   The day before your exam, drink extra fluids at least six 8-ounce glasses (unless your doctor tells you to restrict your fluids).  Patients over 70 or patients with diabetes or kidney problems:   If you haven t had a blood test (creatinine test) within the last 30 days, go to your clinic or Diagnostic Imaging Department for this test.  If you have diabetes:   If your kidney function is normal, continue taking your metformin (Avandamet, Glucophage, Glucovance, Metaglip) on the day of your exam.   If your kidney function is abnormal, wait 48 hours before restarting this medicine.  You will have oral contrast for this exam:   You will drink the contrast at home. Get this from your clinic or Diagnostic Imaging Department. Please follow the directions given.  Please wear loose clothing, such as a sweat suit or jogging clothes. Avoid snaps, zippers and other metal. We may ask you to undress and put on a hospital gown.  If you have any questions, please call the Imaging Department where you will have your exam.            Jul 19, 2017  9:00 AM CDT   Infusion 180 with UC ONCOLOGY INFUSION, UC 24 ATC   [facility] ([facility])    [address] [place] [address]  [place] [address]   [phone]            Jul 24, 2017  3:00 PM CDT   (Arrive by 2:45 PM)   Return Visit with Xochitl Christine MD   [facility] ([facility])    [address] [facility] [address] [phone]              Future tests that were ordered for you today     Open Future Orders        Priority Expected Expires Ordered    CT Chest/Abdomen/Pelvis w Contrast Routine 7/14/2017 7/28/2017 6/21/2017    CBC with platelets differential Routine 7/14/2017 7/28/2017 6/21/2017    Comprehensive metabolic panel Routine 7/14/2017 7/28/2017 6/21/2017            Who to contact     If you have questions or need follow up information about today's clinic visit or your schedule please contact [facility] directly at [phone].  Normal or non-critical lab and imaging results will be communicated to you by MyChart, letter or phone within 4 business days after the clinic has received the results. If you do not hear from us within 7 days, please contact the clinic through [employer] or phone. If you have a critical or abnormal lab result, we will notify you by phone as soon as possible.  Submit refill requests through [employer] or call your pharmacy and they will forward the refill request to us. Please allow 3 business days for your refill to be completed.          Additional Information About Your Visit        [employer] Information     [employer] gives you secure access to your electronic health record. If you see a primary care provider, you can also send messages to your care team and make appointments. If you have questions, please call your primary care clinic.  If you do not have a primary care provider, please call [phone] and they will assist you.        Care EveryWhere ID     This is your Care EveryWhere ID. This could be used by other organizations to access your [place] medical records  [id number]         Blood Pressure from Last 3  Encounters:   06/21/17 105/72   06/07/17 102/74   05/17/17 103/72    Weight from Last 3 Encounters:   06/21/17 58.1 kg (128 lb 1.6 oz)   06/07/17 58.2 kg (128 lb 6.4 oz)   05/17/17 58.5 kg (129 lb)              We Performed the Following     Bilirubin  total     CBC with platelets differential     Protein qualitative urine        Primary Care Provider Office Phone # Fax #    Golden Quinteros [phone]       [facility] [address]  [facility] [address]        Equal Access to Services     [facility]: Hadii kitty ku hadasho Soomaali, waaxda luqadaha, qaybta kaalmada maribell, gene briscoe . So [facility] [phone].    ATENCIÓN: Si habla español, tiene a sewell disposición servicios gratuitos de asistencia lingüística. [facility] [phone].    We comply with applicable federal civil rights laws and [place] laws. We do not discriminate on the basis of race, color, national origin, age, disability sex, sexual orientation or gender identity.            Thank you!     Thank you for choosing [facility]  for your care. Our goal is always to provide you with excellent care. Hearing back from our patients is one way we can continue to improve our services. Please take a few minutes to complete the written survey that you may receive in the mail after your visit with us. Thank you!             Your Updated Medication List - Protect others around you: Learn how to safely use, store and throw away your medicines at www.disposemymeds.org.          This list is accurate as of: 6/21/17  1:01 PM.  Always use your most recent med list.                   Brand Name Dispense Instructions for use Diagnosis    loperamide 2 MG capsule    IMODIUM    30 capsule    Start with 2 caps (4 mg), then take one cap (2 mg) after each diarrheal stool as needed. Do not use more than 8 caps (16 mg) per day.    Rectal cancer (H)       LORazepam 0.5 MG tablet    ATIVAN    30 tablet     Take 1 tablet (0.5 mg) by mouth every 4 hours as needed (Anxiety, Nausea/Vomiting or Sleep)    Rectal cancer (H)       ondansetron 8 MG tablet    ZOFRAN    10 tablet    Take 1 tablet (8 mg) by mouth every 8 hours as needed (Nausea/Vomiting)    Rectal cancer (H)       prochlorperazine 10 MG tablet    COMPAZINE    30 tablet    Take 1 tablet (10 mg) by mouth every 6 hours as needed (Nausea/Vomiting)    Rectal cancer (H)       TYLENOL PO      Take 500 mg by mouth every 6 hours as needed Reported on 4/12/2017

## 2017-06-21 NOTE — NURSING NOTE
"Oncology Rooming Note    June 21, 2017 7:25 AM   Fabienne Lynch is a 47 year old female who presents for:    Chief Complaint   Patient presents with     Oncology Clinic Visit     Rectal Ca- F/U     Initial Vitals: /72 (BP Location: Right arm, Patient Position: Chair, Cuff Size: Adult Regular)  Pulse 64  Temp 97.4  F (36.3  C) (Oral)  Resp 16  Ht 1.632 m (5' 4.25\")  Wt 58.1 kg (128 lb 1.6 oz)  SpO2 100%  BMI 21.82 kg/m2 Estimated body mass index is 21.82 kg/(m^2) as calculated from the following:    Height as of this encounter: 1.632 m (5' 4.25\").    Weight as of this encounter: 58.1 kg (128 lb 1.6 oz). Body surface area is 1.62 meters squared.  No Pain (0) Comment: Data Unavailable   No LMP recorded. Patient has had a hysterectomy.  Allergies reviewed: Yes  Medications reviewed: Yes    Medications: Medication refills not needed today.  Pharmacy name entered into [employer]:    [id number] - [place], [place] - [address] [facility] [place] - [address] [address] [place] [facility] [place], [place] - [address] [place] [address]  [facility] - MAIL ORDER ONLY - [place], [place]    Clinical concerns: no clinical concerns  provider was notified.    7 minutes for nursing intake (face to face time)     Anaya Villagomez CMA              "

## 2017-06-21 NOTE — PATIENT INSTRUCTIONS
Contact Numbers  [facility] Main Line/After Hours: [phone]  [facility] Triage line: [phone]      Please call the triage or after hours line if you experience a temperature greater than or equal to 100.5, shaking chills, have uncontrolled nausea, vomiting and/or diarrhea, dizziness, shortness of breath, chest pain, bleeding, unexplained bruising, or if you have any other new/concerning symptoms, questions or concerns.      If you are having any concerning symptoms or wish to speak to a provider before your next infusion visit, please call to notify us so we can adequately serve you.     If you need a refill on a narcotic prescription or other medication, please call before your infusion appointment.       [facility] will be at your house Friday, 6/32, at 11:30 am to disconnect your pump.          June 2017 Sunday Monday Tuesday Wednesday Thursday Friday Saturday                       1     2     3       4     5     6     7     [facility] D[facility]    7:15 AM   (15 min.)    [facility] LAB DRAW   [facility] Lab Draw     [facility] RETURN    7:30 AM   (30 min.)   Xochitl Christine MD   [facility] ONC INFUSION 180   10:00 AM   (180 min.)    ONCOLOGY INFUSION   [facility] 8     9     10       11     12     13     14     15     16     17       18     19     20     21     [facility] DRAW    6:30 AM   (15 min.)    [facility] LAB DRAW   [facility] Lab Draw     [facility] RETURN    6:45 AM   (50 min.)   Azul Mansfield PA-C   [facility] ONC INFUSION 180    8:00 AM   (180 min.)    ONCOLOGY INFUSION   [facility] 22     23     24       25     26     27     28     29     30                     July 2017 Sunday Monday Tuesday Wednesday Thursday Friday Saturday                                 1       2     3     4     5     6     [facility] DRAW    8:00 AM   (15 min.)    [facility] LAB DRAW   [facility]  Draw     [facility] RETURN    8:25 AM   (50 min.)   Becky Interiano PA-C   [facility]     [facility] ONC INFUSION 180    9:30 AM   (180 min.)    ONCOLOGY INFUSION   [facility] 7     8       9     10     11     12     13     14     15       16     17     18     19     [facility] LAB DRAW    7:00 AM   (15 min.)   [facility] LAB DRAW   [facility] Lab Draw     CT CHEST/ABDOMEN/PELVIS W    7:05 AM   (20 min.)   UCCT1   [facility]     [facility] ONC INFUSION 180    9:00 AM   (180 min.)   UC ONCOLOGY INFUSION   [facility] 20     21     22       23     24     [facility] RETURN    2:45 PM   (30 min.)   Xochitl Christine MD   [facility] 25     26     27     28     29       30     31                                           Lab Results:  Recent Results (from the past 12 hour(s))   Protein qualitative urine    Collection Time: 06/21/17  7:20 AM   Result Value Ref Range    Protein Albumin Urine Negative NEG mg/dL   CBC with platelets differential    Collection Time: 06/21/17  7:25 AM   Result Value Ref Range    WBC 2.0 (L) 4.0 - 11.0 10e9/L    RBC Count 3.44 (L) 3.8 - 5.2 10e12/L    Hemoglobin 11.6 (L) 11.7 - 15.7 g/dL    Hematocrit 33.6 (L) 35.0 - 47.0 %    MCV 98 78 - 100 fl    MCH 33.7 (H) 26.5 - 33.0 pg    MCHC 34.5 31.5 - 36.5 g/dL    RDW 13.5 10.0 - 15.0 %    Platelet Count 82 (L) 150 - 450 10e9/L    Diff Method Automated Method     % Neutrophils 64.6 %    % Lymphocytes 20.2 %    % Monocytes 11.3 %    % Eosinophils 3.4 %    % Basophils 0.5 %    % Immature Granulocytes 0.0 %    Nucleated RBCs 0 0 /100    Absolute Neutrophil 1.3 (L) 1.6 - 8.3 10e9/L    Absolute Lymphocytes 0.4 (L) 0.8 - 5.3 10e9/L    Absolute Monocytes 0.2 0.0 - 1.3 10e9/L    Absolute Eosinophils 0.1 0.0 - 0.7 10e9/L    Absolute Basophils 0.0 0.0 - 0.2 10e9/L    Abs Immature Granulocytes 0.0 0 - 0.4 10e9/L    Absolute Nucleated RBC 0.0    Bilirubin  total    Collection Time:  06/21/17  7:25 AM   Result Value Ref Range    Bilirubin Total 2.0 (H) 0.2 - 1.3 mg/dL   Hepatic panel    Collection Time: 06/21/17  7:25 AM   Result Value Ref Range    Bilirubin Direct 0.4 (H) 0.0 - 0.2 mg/dL    Bilirubin Total 2.0 (H) 0.2 - 1.3 mg/dL    Albumin 3.6 3.4 - 5.0 g/dL    Protein Total 6.8 6.8 - 8.8 g/dL    Alkaline Phosphatase 89 40 - 150 U/L    ALT 29 0 - 50 U/L    AST 30 0 - 45 U/L

## 2017-06-23 ENCOUNTER — HOME INFUSION (PRE-WILLOW HOME INFUSION) (OUTPATIENT)
Dept: PHARMACY | Facility: CLINIC | Age: 47
End: 2017-06-23

## 2017-06-29 NOTE — PROGRESS NOTES
This is a snapshot of the patient's [facility] medical record. For complete information or questions call [phone] or In [facility]  Home [facility]fusion ([id number]).  [facility] Number:  [id number]

## 2017-07-06 ENCOUNTER — INFUSION THERAPY VISIT (OUTPATIENT)
Dept: ONCOLOGY | Facility: CLINIC | Age: 47
End: 2017-07-06
Attending: INTERNAL MEDICINE
Payer: COMMERCIAL

## 2017-07-06 ENCOUNTER — APPOINTMENT (OUTPATIENT)
Dept: LAB | Facility: CLINIC | Age: 47
End: 2017-07-06
Attending: INTERNAL MEDICINE
Payer: COMMERCIAL

## 2017-07-06 ENCOUNTER — HOME INFUSION (PRE-WILLOW HOME INFUSION) (OUTPATIENT)
Dept: PHARMACY | Facility: CLINIC | Age: 47
End: 2017-07-06

## 2017-07-06 VITALS
BODY MASS INDEX: 21.85 KG/M2 | OXYGEN SATURATION: 97 % | SYSTOLIC BLOOD PRESSURE: 103 MMHG | HEART RATE: 66 BPM | WEIGHT: 128.3 LBS | DIASTOLIC BLOOD PRESSURE: 71 MMHG | RESPIRATION RATE: 16 BRPM | TEMPERATURE: 97.8 F

## 2017-07-06 DIAGNOSIS — C20 RECTAL CANCER (H): Primary | ICD-10-CM

## 2017-07-06 LAB
ALBUMIN UR-MCNC: NEGATIVE MG/DL
BASOPHILS # BLD AUTO: 0 10E9/L (ref 0–0.2)
BASOPHILS NFR BLD AUTO: 0.8 %
BILIRUB SERPL-MCNC: 1.2 MG/DL (ref 0.2–1.3)
DIFFERENTIAL METHOD BLD: ABNORMAL
EOSINOPHIL # BLD AUTO: 0.1 10E9/L (ref 0–0.7)
EOSINOPHIL NFR BLD AUTO: 4.9 %
ERYTHROCYTE [DISTWIDTH] IN BLOOD BY AUTOMATED COUNT: 14.1 % (ref 10–15)
HCT VFR BLD AUTO: 35.8 % (ref 35–47)
HGB BLD-MCNC: 12.1 G/DL (ref 11.7–15.7)
IMM GRANULOCYTES # BLD: 0 10E9/L (ref 0–0.4)
IMM GRANULOCYTES NFR BLD: 0 %
LYMPHOCYTES # BLD AUTO: 0.5 10E9/L (ref 0.8–5.3)
LYMPHOCYTES NFR BLD AUTO: 18.4 %
MCH RBC QN AUTO: 32.7 PG (ref 26.5–33)
MCHC RBC AUTO-ENTMCNC: 33.8 G/DL (ref 31.5–36.5)
MCV RBC AUTO: 97 FL (ref 78–100)
MONOCYTES # BLD AUTO: 0.2 10E9/L (ref 0–1.3)
MONOCYTES NFR BLD AUTO: 9.8 %
NEUTROPHILS # BLD AUTO: 1.6 10E9/L (ref 1.6–8.3)
NEUTROPHILS NFR BLD AUTO: 66.1 %
NRBC # BLD AUTO: 0 10*3/UL
NRBC BLD AUTO-RTO: 0 /100
PLATELET # BLD AUTO: 101 10E9/L (ref 150–450)
RBC # BLD AUTO: 3.7 10E12/L (ref 3.8–5.2)
WBC # BLD AUTO: 2.5 10E9/L (ref 4–11)

## 2017-07-06 PROCEDURE — 96375 TX/PRO/DX INJ NEW DRUG ADDON: CPT

## 2017-07-06 PROCEDURE — 96367 TX/PROPH/DG ADDL SEQ IV INF: CPT

## 2017-07-06 PROCEDURE — 99214 OFFICE O/P EST MOD 30 MIN: CPT | Mod: ZP | Performed by: PHYSICIAN ASSISTANT

## 2017-07-06 PROCEDURE — 96417 CHEMO IV INFUS EACH ADDL SEQ: CPT

## 2017-07-06 PROCEDURE — 25000128 H RX IP 250 OP 636: Mod: ZF | Performed by: PHYSICIAN ASSISTANT

## 2017-07-06 PROCEDURE — 36415 COLL VENOUS BLD VENIPUNCTURE: CPT | Performed by: INTERNAL MEDICINE

## 2017-07-06 PROCEDURE — 96413 CHEMO IV INFUSION 1 HR: CPT

## 2017-07-06 PROCEDURE — 85025 COMPLETE CBC W/AUTO DIFF WBC: CPT | Performed by: INTERNAL MEDICINE

## 2017-07-06 PROCEDURE — 96415 CHEMO IV INFUSION ADDL HR: CPT

## 2017-07-06 PROCEDURE — 81003 URINALYSIS AUTO W/O SCOPE: CPT | Performed by: INTERNAL MEDICINE

## 2017-07-06 PROCEDURE — 82247 BILIRUBIN TOTAL: CPT | Performed by: INTERNAL MEDICINE

## 2017-07-06 PROCEDURE — 25000125 ZZHC RX 250: Mod: ZF | Performed by: PHYSICIAN ASSISTANT

## 2017-07-06 RX ORDER — METHYLPREDNISOLONE SODIUM SUCCINATE 125 MG/2ML
125 INJECTION, POWDER, LYOPHILIZED, FOR SOLUTION INTRAMUSCULAR; INTRAVENOUS
Status: CANCELLED
Start: 2017-07-06

## 2017-07-06 RX ORDER — SODIUM CHLORIDE 9 MG/ML
1000 INJECTION, SOLUTION INTRAVENOUS CONTINUOUS PRN
Status: CANCELLED
Start: 2017-07-06

## 2017-07-06 RX ORDER — LORAZEPAM 2 MG/ML
0.5 INJECTION INTRAMUSCULAR EVERY 4 HOURS PRN
Status: CANCELLED
Start: 2017-07-06

## 2017-07-06 RX ORDER — ALBUTEROL SULFATE 90 UG/1
1-2 AEROSOL, METERED RESPIRATORY (INHALATION)
Status: CANCELLED
Start: 2017-07-06

## 2017-07-06 RX ORDER — EPINEPHRINE 0.3 MG/.3ML
0.3 INJECTION SUBCUTANEOUS EVERY 5 MIN PRN
Status: CANCELLED | OUTPATIENT
Start: 2017-07-06

## 2017-07-06 RX ORDER — DIPHENHYDRAMINE HYDROCHLORIDE 50 MG/ML
50 INJECTION INTRAMUSCULAR; INTRAVENOUS
Status: CANCELLED
Start: 2017-07-06

## 2017-07-06 RX ORDER — ALBUTEROL SULFATE 0.83 MG/ML
2.5 SOLUTION RESPIRATORY (INHALATION)
Status: CANCELLED | OUTPATIENT
Start: 2017-07-06

## 2017-07-06 RX ORDER — MEPERIDINE HYDROCHLORIDE 25 MG/ML
25 INJECTION INTRAMUSCULAR; INTRAVENOUS; SUBCUTANEOUS EVERY 30 MIN PRN
Status: CANCELLED | OUTPATIENT
Start: 2017-07-06

## 2017-07-06 RX ORDER — HEPARIN SODIUM (PORCINE) LOCK FLUSH IV SOLN 100 UNIT/ML 100 UNIT/ML
5 SOLUTION INTRAVENOUS
Status: COMPLETED | OUTPATIENT
Start: 2017-07-06 | End: 2017-07-06

## 2017-07-06 RX ADMIN — DEXAMETHASONE SODIUM PHOSPHATE: 10 INJECTION, SOLUTION INTRAMUSCULAR; INTRAVENOUS at 10:16

## 2017-07-06 RX ADMIN — SODIUM CHLORIDE 150 MG: 9 INJECTION, SOLUTION INTRAVENOUS at 10:29

## 2017-07-06 RX ADMIN — IRINOTECAN HYDROCHLORIDE 150 MG: 20 INJECTION, SOLUTION INTRAVENOUS at 11:27

## 2017-07-06 RX ADMIN — SODIUM CHLORIDE 250 ML: 9 INJECTION, SOLUTION INTRAVENOUS at 10:03

## 2017-07-06 RX ADMIN — ATROPINE SULFATE 0.4 MG: 0.4 INJECTION INTRAMUSCULAR; INTRAVENOUS; SUBCUTANEOUS at 11:25

## 2017-07-06 RX ADMIN — BEVACIZUMAB 300 MG: 100 INJECTION, SOLUTION INTRAVENOUS at 10:55

## 2017-07-06 RX ADMIN — SODIUM CHLORIDE, PRESERVATIVE FREE 5 ML: 5 INJECTION INTRAVENOUS at 08:35

## 2017-07-06 ASSESSMENT — PAIN SCALES - GENERAL: PAINLEVEL: NO PAIN (0)

## 2017-07-06 NOTE — NURSING NOTE
"Chief Complaint   Patient presents with     Oncology Clinic Visit     Rectal cancer, review labs     Port Draw     port accessed and labs drawn by rn.  vs taken.     Port accessed with 20g 3/4\" power needle, labs drawn, port flushed with saline and heparin, vitals checked, pt checked in for next appointment.  Wilma Alanis RN    "

## 2017-07-06 NOTE — MR AVS SNAPSHOT
After Visit Summary   7/6/2017    Fabienne Lynch    MRN: [id number]           Patient Information     Date Of Birth          1970        Visit Information        Provider Department      7/6/2017 8:40 AM Becky Interiano PA-C [facility]        Today's Diagnoses     Rectal cancer (H)    -  1       Follow-ups after your visit        Your next 10 appointments already scheduled     Jul 19, 2017  7:00 AM CDT   [facility] Draw with  [facility] DRAW   [facility] Draw ([facility])    [address] [id number] [phone]            Jul 19, 2017  7:20 AM CDT   (Arrive by 7:05 AM)   CT CHEST/ABDOMEN/PELVIS W CONTRAST with UCCT1   [facility] CT ([facility])    [address] [place] [id number] [phone]           Please bring any scans or X-rays taken at other hospitals, if similar tests were done. Also bring a list of your medicines, including vitamins, minerals and over-the-counter drugs. It is safest to leave personal items at home.  Be sure to tell your doctor:   If you have any allergies.   If there s any chance you are pregnant.   If you are breastfeeding.   If you have any special needs.  You may have contrast for this exam. To prepare:   Do not eat or drink for 2 hours before your exam. If you need to take medicine, you may take it with small sips of water. (We may ask you to take liquid medicine as well.)   The day before your exam, drink extra fluids at least six 8-ounce glasses (unless your doctor tells you to restrict your fluids).  Patients over 70 or patients with diabetes or kidney problems:   If you haven t had a blood test (creatinine test) within the last 30 days, go to your clinic or Diagnostic Imaging Department for this test.  If you have diabetes:   If your kidney function is normal, continue taking your metformin  (Avandamet, Glucophage, Glucovance, Metaglip) on the day of your exam.   If your kidney function is abnormal, wait 48 hours before restarting this medicine.  You will have oral contrast for this exam:   You will drink the contrast at home. Get this from your clinic or Diagnostic Imaging Department. Please follow the directions given.  Please wear loose clothing, such as a sweat suit or jogging clothes. Avoid snaps, zippers and other metal. We may ask you to undress and put on a hospital gown.  If you have any questions, please call the Imaging Department where you will have your exam.            Jul 19, 2017  9:00 AM CDT   Infusion 180 with  ONCOLOGY INFUSION,  24 ATC   [facility] ([facility])    [address] [place] [id number] [phone]            Jul 24, 2017  3:00 PM CDT   (Arrive by 2:45 PM)   Return Visit with Xochitl Christine MD   [facility] ([facility])    [address] [place] [id number] [phone]              Who to contact     If you have questions or need follow up information about today's clinic visit or your schedule please contact [facility] directly at [phone].  Normal or non-critical lab and imaging results will be communicated to you by MyChart, letter or phone within 4 business days after the clinic has received the results. If you do not hear from us within 7 days, please contact the clinic through MyChart or phone. If you have a critical or abnormal lab result, we will notify you by phone as soon as possible.  Submit refill requests through [employer] or call your pharmacy and they will forward the refill request to us. Please allow 3 business days for your refill to be completed.          Additional Information About Your Visit        [employer] Information     [employer] gives you secure access to your electronic health record.  If you see a primary care provider, you can also send messages to your care team and make appointments. If you have questions, please call your primary care clinic.  If you do not have a primary care provider, please call [phone] and they will assist you.        Care EveryWhere ID     This is your Care EveryWhere ID. This could be used by other organizations to access your [facility] medical records  [id number]        Your Vitals Were     Pulse Temperature Respirations Pulse Oximetry BMI (Body Mass Index)       66 97.8  F (36.6  C) (Oral) 16 97% 21.85 kg/m2        Blood Pressure from Last 3 Encounters:   07/06/17 103/71   06/21/17 105/72   06/07/17 102/74    Weight from Last 3 Encounters:   07/06/17 58.2 kg (128 lb 4.8 oz)   06/21/17 58.1 kg (128 lb 1.6 oz)   06/07/17 58.2 kg (128 lb 6.4 oz)              Today, you had the following     No orders found for display       Primary Care Provider Office Phone # Fax #    Golden Quinteros [phone]       [facility] [address]  [facility] [address]        Equal Access to Services     FANNY SCOTT AH: Hadii kitty zhuo Soana, waaxda luqadaha, qaybta kaalmada adeegyada, gene meeks. So [facility] [phone].    ATENCIÓN: Si habla español, tiene a sewell disposición servicios gratuitos de asistencia lingüística. Llame al [phone].    We comply with applicable federal civil rights laws and [place] laws. We do not discriminate on the basis of race, color, national origin, age, disability sex, sexual orientation or gender identity.            Thank you!     Thank you for choosing [facility]  for your care. Our goal is always to provide you with excellent care. Hearing back from our patients is one way we can continue to improve our services. Please take a few minutes to complete the written survey that you may receive in the mail after your visit with us. Thank you!             Your Updated  Medication List - Protect others around you: Learn how to safely use, store and throw away your medicines at www.disposemymeds.org.          This list is accurate as of: 7/6/17 11:59 PM.  Always use your most recent med list.                   Brand Name Dispense Instructions for use Diagnosis    loperamide 2 MG capsule    IMODIUM    30 capsule    Start with 2 caps (4 mg), then take one cap (2 mg) after each diarrheal stool as needed. Do not use more than 8 caps (16 mg) per day.    Rectal cancer (H)       LORazepam 0.5 MG tablet    ATIVAN    30 tablet    Take 1 tablet (0.5 mg) by mouth every 4 hours as needed (Anxiety, Nausea/Vomiting or Sleep)    Rectal cancer (H)       ondansetron 8 MG tablet    ZOFRAN    10 tablet    Take 1 tablet (8 mg) by mouth every 8 hours as needed (Nausea/Vomiting)    Rectal cancer (H)       prochlorperazine 10 MG tablet    COMPAZINE    30 tablet    Take 1 tablet (10 mg) by mouth every 6 hours as needed (Nausea/Vomiting)    Rectal cancer (H)       TYLENOL PO      Take 500 mg by mouth every 6 hours as needed Reported on 4/12/2017

## 2017-07-06 NOTE — NURSING NOTE
"Oncology Rooming Note    July 6, 2017 8:50 AM   Fabienne Lynch is a 47 year old female who presents for:    Chief Complaint   Patient presents with     Oncology Clinic Visit     Rectal cancer, review labs     Initial Vitals: /71 (BP Location: Right arm, Patient Position: Chair, Cuff Size: Adult Regular)  Pulse 66  Temp 97.8  F (36.6  C) (Oral)  Resp 16  Wt 58.2 kg (128 lb 4.8 oz)  SpO2 97%  BMI 21.85 kg/m2 Estimated body mass index is 21.85 kg/(m^2) as calculated from the following:    Height as of 6/21/17: 1.632 m (5' 4.25\").    Weight as of this encounter: 58.2 kg (128 lb 4.8 oz). Body surface area is 1.62 meters squared.  No Pain (0) Comment: Data Unavailable   No LMP recorded. Patient has had a hysterectomy.  Allergies reviewed: Yes  Medications reviewed: Yes    Medications: Medication refills not needed today.  Pharmacy name entered into [employer]:    [id number] - [place], [place] - [address] [facility] [place] - [address] [facility] [address] [place], [place] - [address] [place] [address]  [facility] - [place], [place]    Clinical concerns: White blood cell count.  Yes was notified.    10 minutes for nursing intake (face to face time)     Carley Osborne CMA              "

## 2017-07-06 NOTE — LETTER
7/6/2017      RE: Fabienne Lynch  [address] [facility] [address]       HEMATOLOGY/ONCOLOGY PROGRESS NOTE  Jul 6, 2017    REASON FOR VISIT: follow-up of rectal carcinoma with mets to liver    DIAGNOSIS:   Fabienne Lynch is a 47 year old female with rectal carcinoma with mets to liver. Please refer to Dr. Christine's notes for full history. Summarized briefly here, Fabienne was diagnosed with an obstructing rectal mass.  She was initiated on FOLFOX and then bevacizumab was added later. She had a very good response to the metastatic disease in her liver and also at the primary site. She was switched to 5-FU and Avastin maintenance, but unfortunately at the time of followup scan she was found to have progressive disease at the site of the original tumor. She underwent chemoradiation for the rectal lesion and then took a break because of multiple family events and obligations. At the time of initiation of the chemotherapy with 5-FU, irinotecan,(FOLFIRI) and bevacizumab she had mildly progressive disease. Today she is here for C4.     INTERVAL HISTORY:   Fabienne is here today along with her , Jeff. Fabienne is feeling well today. She has been experiencing fatigue for 3 days starting the night of chemotherapy. The fourth day she feels much better, but still can experience fatigue especially at the end of the day. She had been experiencing significant nausea post-infusion, but Emend was added to her anti-nausea regimen last cycle which made her nausea very manageable; nausea is still present, but she did not vomit at all this last cycle. She does not eat or drink much for the 3 days following infusion, but eating and drinking return to normal at day 4. She has occasional epistaxis and gum bleeding when brushing her teeth, but both resolve quickly. Fabienne does experience some diarrhea 24h after infusion and then again ~11d later. She has not been taking anything for this, but is considering taking Imodium when the diarrhea  "interferes with activities. She reports that since starting the present treatment the HFS has slowly disappeared, and her neuropathy has \"completely resolved\".     Otherwise, ROS negative for: fever, chills, sweats, signs of infection, rashes, change in vision or hearing, mucositis, shortness of breath, cough, chest pain, abdominal pain, vomiting, diarrhea, weakness, easy bleeding or bruising, lymphadenopathy, or bone pain.    Current Outpatient Prescriptions   Medication Sig Dispense Refill     LORazepam (ATIVAN) 0.5 MG tablet Take 1 tablet (0.5 mg) by mouth every 4 hours as needed (Anxiety, Nausea/Vomiting or Sleep) (Patient not taking: Reported on 7/6/2017) 30 tablet 2     prochlorperazine (COMPAZINE) 10 MG tablet Take 1 tablet (10 mg) by mouth every 6 hours as needed (Nausea/Vomiting) (Patient not taking: Reported on 6/7/2017) 30 tablet 2     ondansetron (ZOFRAN) 8 MG tablet Take 1 tablet (8 mg) by mouth every 8 hours as needed (Nausea/Vomiting) (Patient not taking: Reported on 7/6/2017) 10 tablet 2     loperamide (IMODIUM) 2 MG capsule Start with 2 caps (4 mg), then take one cap (2 mg) after each diarrheal stool as needed. Do not use more than 8 caps (16 mg) per day. (Patient not taking: Reported on 6/7/2017) 30 capsule 0     Acetaminophen (TYLENOL PO) Take 500 mg by mouth every 6 hours as needed Reported on 4/12/2017          Allergies   Allergen Reactions     Nitrous Oxide Nausea and Vomiting     PHYSICAL EXAMINATION  /71 (BP Location: Right arm, Patient Position: Chair, Cuff Size: Adult Regular)  Pulse 66  Temp 97.8  F (36.6  C) (Oral)  Resp 16  Wt 58.2 kg (128 lb 4.8 oz)  SpO2 97%  BMI 21.85 kg/m2  Constitutional: Alert, oriented female in no visible distress.  Eyes: PERRL. Anicteric sclerae.  ENT/Mouth: OM moist and pink without lesions or thrush.  CV: RRR, no murmurs appreciated.  Resp: CTAB throughout  Abdomen: Soft, non-tender, non-distended. Bowel sounds present. No masses appreciated. No " organomegaly noted.  Extremities: No lower extremity edema appreciated.  Skin: Warm, dry. No bruising or petechiae noted.  Lymph: No cervical or supraclavicular lymphadenopathy appreciated.   Neuro: CN II-XII grossly intact.    LABS:   6/21/2017 07:25 6/21/2017 07:25 7/6/2017 08:48   Albumin  3.6    Protein Total  6.8    Bilirubin Total 2.0 (H) 2.0 (H) 1.2   Alkaline Phosphatase  89    ALT  29    AST  30    Bilirubin Direct  0.4 (H)    WBC 2.0 (L)  2.5 (L)   Hemoglobin 11.6 (L)  12.1   Hematocrit 33.6 (L)  35.8   Platelet Count 82 (L)  101 (L)   RBC Count 3.44 (L)  3.70 (L)   MCV 98  97   MCH 33.7 (H)  32.7   MCHC 34.5  33.8   RDW 13.5  14.1   Diff Method Automated Method  Automated Method   % Neutrophils 64.6  66.1   % Lymphocytes 20.2  18.4   % Monocytes 11.3  9.8   % Eosinophils 3.4  4.9   % Basophils 0.5  0.8   % Immature Granulocytes 0.0  0.0   Nucleated RBCs 0  0   Absolute Neutrophil 1.3 (L)  1.6   Absolute Lymphocytes 0.4 (L)  0.5 (L)   Absolute Monocytes 0.2  0.2   Absolute Eosinophils 0.1  0.1   Absolute Basophils 0.0  0.0   Abs Immature Granulocytes 0.0  0.0   Absolute Nucleated RBC 0.0  0.0     IMPRESSION/PLAN:  Fabienne Lynch is a 47 year old female with rectal carcinoma with mets to liver. Currently being treated with FOLFIRI and bevacizumab.     Oncology. S/P 3 cycles of FOLFIRI and bevacizumab with expected side effects (only fatigue, and nausea controlled with Emend). Dose of 5-FU was modified for C3 per discussion with Dr. Christine, eliminating the bolus due to low ANC 1.3. Reduced dose will continue. Irinotecan will continue at 90 mg/m2. Discussed with patient and  that this chemo regimen will continue for the near future. Their son will be graduating from [employer] in the fall, so they will want to schedule around that event.  - 7/19/17 for labs, CT CAP, and infusion C5.  - 7/24/17 Dr. Christine to review CT    Fatigue. Likely due to cumulative effect of chemo. Discussed taking naps when needed,  especially the first days after infusion, and then continuing with her mild-moderate exercise regimen which seems to give her more energy.    Nausea. Known side-effect of FOLFIRI, and is much improved with added Emend to pre-meds. May also try prochloraperazine and/or lorazepam for additional support.      MELBA SantamariaS    Patient was seen and examined by me, as noted above. Stefanie GOMEZ acted as a scribe. I have reviewed and edited the above note.      Becky Interiano PA-C

## 2017-07-06 NOTE — PROGRESS NOTES
Infusion Nursing Note:  Fabienne Lynch presents today for Cycle 4 Day 1 Avastin, Irinotecan, Fluorouracil pump.    Patient seen by provider today: Yes: Becky SMITH   present during visit today: Not Applicable.      Intravenous Access:  Implanted Port.    Treatment Conditions:  Lab Results   Component Value Date    HGB 12.1 07/06/2017     Lab Results   Component Value Date    WBC 2.5 07/06/2017      Lab Results   Component Value Date    ANEU 1.6 07/06/2017     Lab Results   Component Value Date     07/06/2017          Lab Results   Component Value Date    BILITOTAL 1.2 07/06/2017           UA RESULTS:  Recent Labs   Lab Test  07/06/17   0848   PROTEIN  Negative         Results reviewed, labs MET treatment parameters, ok to proceed with treatment.    Note: Positive blood return pre/post infusion and prior to Fluorouracil pump connect.  Fluorouracil C-Series pump infusing at 5.2ml/hr.  Connections taped, clamps taped open.  Capillary element taped down with tegaderm to skin.  Pump connections double checked by Rochelle Elizabeth RN.    Lorna with [facility] contacted with pump d/c date time - Saturday at 1100.   Fabienne will be in [place] at a friends house as [facility] doesn't travel to [place].  [facility] has the [place] address.    Post Infusion Assessment:  Patient tolerated infusion without incident.  Blood return noted pre and post infusion.  Site patent and intact, free from redness, edema or discomfort.  No evidence of extravasations.    Discharge Plan:   Patient declined prescription refills.  AVS to patient via [employer]T.  Patient will return 7/19 for labs, CT, chemo and on 7/24 to see MD for next appointment.   Patient discharged in stable condition accompanied by: .  Departure Mode: Ambulatory.  Face to Face time: 0.    Kalee Mathis RN

## 2017-07-06 NOTE — Clinical Note
7/6/2017       RE: Fabienne Lynch  [address] [facility] [address]     Dear Colleague,    Thank you for referring your patient, Fabienne Lynch, to the [facility]. Please see a copy of my visit note below.    HEMATOLOGY/ONCOLOGY PROGRESS NOTE  Jul 6, 2017    REASON FOR VISIT: follow-up of rectal carcinoma with mets to liver    DIAGNOSIS:   Fabienne Lynch is a 47 year old female with rectal carcinoma with mets to liver. Please refer to Dr. Christine's notes for full history. Summarized briefly here, Fabienne was diagnosed with an obstructing rectal mass.  She was initiated on FOLFOX and then bevacizumab was added later. She had a very good response to the metastatic disease in her liver and also at the primary site. She was switched to 5-FU and Avastin maintenance, but unfortunately at the time of followup scan she was found to have progressive disease at the site of the original tumor. She underwent chemoradiation for the rectal lesion and then took a break because of multiple family events and obligations. At the time of initiation of the chemotherapy with 5-FU, irinotecan,(FOLFIRI) and bevacizumab she had mildly progressive disease. Today she is here for C4.     INTERVAL HISTORY:   Fabienne is here today along with her , Jeff. Fabienne is feeling well today. She has been experiencing fatigue for 3 days starting the night of chemotherapy. The fourth day she feels much better, but still can experience fatigue especially at the end of the day. She had been experiencing significant nausea post-infusion, but Emend was added to her anti-nausea regimen last cycle which made her nausea very manageable; nausea is still present, but she did not vomit at all this last cycle. She does not eat or drink much for the 3 days following infusion, but eating and drinking return to normal at day 4. She has occasional epistaxis and gum bleeding when brushing her teeth, but both resolve quickly. Fabienne does experience  "some diarrhea 24h after infusion and then again ~11d later. She has not been taking anything for this, but is considering taking Imodium when the diarrhea interferes with activities. She reports that since starting the present treatment the HFS has slowly disappeared, and her neuropathy has \"completely resolved\".     Otherwise, ROS negative for: fever, chills, sweats, signs of infection, rashes, change in vision or hearing, mucositis, shortness of breath, cough, chest pain, abdominal pain, vomiting, diarrhea, weakness, easy bleeding or bruising, lymphadenopathy, or bone pain.    Current Outpatient Prescriptions   Medication Sig Dispense Refill     LORazepam (ATIVAN) 0.5 MG tablet Take 1 tablet (0.5 mg) by mouth every 4 hours as needed (Anxiety, Nausea/Vomiting or Sleep) (Patient not taking: Reported on 7/6/2017) 30 tablet 2     prochlorperazine (COMPAZINE) 10 MG tablet Take 1 tablet (10 mg) by mouth every 6 hours as needed (Nausea/Vomiting) (Patient not taking: Reported on 6/7/2017) 30 tablet 2     ondansetron (ZOFRAN) 8 MG tablet Take 1 tablet (8 mg) by mouth every 8 hours as needed (Nausea/Vomiting) (Patient not taking: Reported on 7/6/2017) 10 tablet 2     loperamide (IMODIUM) 2 MG capsule Start with 2 caps (4 mg), then take one cap (2 mg) after each diarrheal stool as needed. Do not use more than 8 caps (16 mg) per day. (Patient not taking: Reported on 6/7/2017) 30 capsule 0     Acetaminophen (TYLENOL PO) Take 500 mg by mouth every 6 hours as needed Reported on 4/12/2017          Allergies   Allergen Reactions     Nitrous Oxide Nausea and Vomiting     PHYSICAL EXAMINATION  /71 (BP Location: Right arm, Patient Position: Chair, Cuff Size: Adult Regular)  Pulse 66  Temp 97.8  F (36.6  C) (Oral)  Resp 16  Wt 58.2 kg (128 lb 4.8 oz)  SpO2 97%  BMI 21.85 kg/m2  Constitutional: Alert, oriented female in no visible distress.  Eyes: PERRL. Anicteric sclerae.  ENT/Mouth: OM moist and pink without lesions or " thrush.  CV: RRR, no murmurs appreciated.  Resp: CTAB throughout  Abdomen: Soft, non-tender, non-distended. Bowel sounds present. No masses appreciated. No organomegaly noted.  Extremities: No lower extremity edema appreciated.  Skin: Warm, dry. No bruising or petechiae noted.  Lymph: No cervical or supraclavicular lymphadenopathy appreciated.   Neuro: CN II-XII grossly intact.    LABS:  Results for PHILIP APODACA (MRN [id number]) as of 7/6/2017 11:09   6/21/2017 07:25 6/21/2017 07:25 7/6/2017 08:48   Albumin  3.6    Protein Total  6.8    Bilirubin Total 2.0 (H) 2.0 (H) 1.2   Alkaline Phosphatase  89    ALT  29    AST  30    Bilirubin Direct  0.4 (H)    WBC 2.0 (L)  2.5 (L)   Hemoglobin 11.6 (L)  12.1   Hematocrit 33.6 (L)  35.8   Platelet Count 82 (L)  101 (L)   RBC Count 3.44 (L)  3.70 (L)   MCV 98  97   MCH 33.7 (H)  32.7   MCHC 34.5  33.8   RDW 13.5  14.1   Diff Method Automated Method  Automated Method   % Neutrophils 64.6  66.1   % Lymphocytes 20.2  18.4   % Monocytes 11.3  9.8   % Eosinophils 3.4  4.9   % Basophils 0.5  0.8   % Immature Granulocytes 0.0  0.0   Nucleated RBCs 0  0   Absolute Neutrophil 1.3 (L)  1.6   Absolute Lymphocytes 0.4 (L)  0.5 (L)   Absolute Monocytes 0.2  0.2   Absolute Eosinophils 0.1  0.1   Absolute Basophils 0.0  0.0   Abs Immature Granulocytes 0.0  0.0   Absolute Nucleated RBC 0.0  0.0       IMPRESSION/PLAN:  Philip Apodaca is a 47 year old female with rectal carcinoma with mets to liver. Currently being treated with FOLFIRI and bevacizumab.     Oncology. S/P 3 cycles of FOLFIRI and bevacizumab with expected side effects (only fatigue, and nausea controlled with Emend). Dose of 5-FU was modified for C3 per discussion with Dr. Christine, eliminating the bolus due to low ANC 1.3. Reduced dose will continue. Irinotecan will continue at 90 mg/m2. Discussed with patient and  that this chemo regimen will continue for the near future. Their son will be graduating from [employer] in  the fall, so they will want to schedule around that event.  - 7/19/17 for labs, CT CAP, and infusion C5.  - 7/24/17 Dr. Christine to review CT    Fatigue. Likely due to cumulative effect of chemo. Discussed taking naps when needed, especially the first days after infusion, and then continuing with her mild-moderate exercise regimen which seems to give her more energy.    Nausea. Known side-effect of FOLFIRI, and is much improved with added Emend to pre-meds. May also try prochloraperazine and/or lorazepam for additional support.      LUIS Santamaria-S      Again, thank you for allowing me to participate in the care of your patient.      Sincerely,    MELBA MorganC

## 2017-07-06 NOTE — PROGRESS NOTES
HEMATOLOGY/ONCOLOGY PROGRESS NOTE  Jul 6, 2017    REASON FOR VISIT: follow-up of rectal carcinoma with mets to liver    DIAGNOSIS:   Fabienne Lynch is a 47 year old female with rectal carcinoma with mets to liver. Please refer to Dr. Christine's notes for full history. Summarized briefly here, Fabienne was diagnosed with an obstructing rectal mass.  She was initiated on FOLFOX and then bevacizumab was added later. She had a very good response to the metastatic disease in her liver and also at the primary site. She was switched to 5-FU and Avastin maintenance, but unfortunately at the time of followup scan she was found to have progressive disease at the site of the original tumor. She underwent chemoradiation for the rectal lesion and then took a break because of multiple family events and obligations. At the time of initiation of the chemotherapy with 5-FU, irinotecan,(FOLFIRI) and bevacizumab she had mildly progressive disease. Today she is here for C4.     INTERVAL HISTORY:   Fabienne is here today along with her , Jeff. Fabienne is feeling well today. She has been experiencing fatigue for 3 days starting the night of chemotherapy. The fourth day she feels much better, but still can experience fatigue especially at the end of the day. She had been experiencing significant nausea post-infusion, but Emend was added to her anti-nausea regimen last cycle which made her nausea very manageable; nausea is still present, but she did not vomit at all this last cycle. She does not eat or drink much for the 3 days following infusion, but eating and drinking return to normal at day 4. She has occasional epistaxis and gum bleeding when brushing her teeth, but both resolve quickly. Fabienne does experience some diarrhea 24h after infusion and then again ~11d later. She has not been taking anything for this, but is considering taking Imodium when the diarrhea interferes with activities. She reports that since starting the present  "treatment the HFS has slowly disappeared, and her neuropathy has \"completely resolved\".     Otherwise, ROS negative for: fever, chills, sweats, signs of infection, rashes, change in vision or hearing, mucositis, shortness of breath, cough, chest pain, abdominal pain, vomiting, diarrhea, weakness, easy bleeding or bruising, lymphadenopathy, or bone pain.    Current Outpatient Prescriptions   Medication Sig Dispense Refill     LORazepam (ATIVAN) 0.5 MG tablet Take 1 tablet (0.5 mg) by mouth every 4 hours as needed (Anxiety, Nausea/Vomiting or Sleep) (Patient not taking: Reported on 7/6/2017) 30 tablet 2     prochlorperazine (COMPAZINE) 10 MG tablet Take 1 tablet (10 mg) by mouth every 6 hours as needed (Nausea/Vomiting) (Patient not taking: Reported on 6/7/2017) 30 tablet 2     ondansetron (ZOFRAN) 8 MG tablet Take 1 tablet (8 mg) by mouth every 8 hours as needed (Nausea/Vomiting) (Patient not taking: Reported on 7/6/2017) 10 tablet 2     loperamide (IMODIUM) 2 MG capsule Start with 2 caps (4 mg), then take one cap (2 mg) after each diarrheal stool as needed. Do not use more than 8 caps (16 mg) per day. (Patient not taking: Reported on 6/7/2017) 30 capsule 0     Acetaminophen (TYLENOL PO) Take 500 mg by mouth every 6 hours as needed Reported on 4/12/2017          Allergies   Allergen Reactions     Nitrous Oxide Nausea and Vomiting     PHYSICAL EXAMINATION  /71 (BP Location: Right arm, Patient Position: Chair, Cuff Size: Adult Regular)  Pulse 66  Temp 97.8  F (36.6  C) (Oral)  Resp 16  Wt 58.2 kg (128 lb 4.8 oz)  SpO2 97%  BMI 21.85 kg/m2  Constitutional: Alert, oriented female in no visible distress.  Eyes: PERRL. Anicteric sclerae.  ENT/Mouth: OM moist and pink without lesions or thrush.  CV: RRR, no murmurs appreciated.  Resp: CTAB throughout  Abdomen: Soft, non-tender, non-distended. Bowel sounds present. No masses appreciated. No organomegaly noted.  Extremities: No lower extremity edema " appreciated.  Skin: Warm, dry. No bruising or petechiae noted.  Lymph: No cervical or supraclavicular lymphadenopathy appreciated.   Neuro: CN II-XII grossly intact.    LABS:   6/21/2017 07:25 6/21/2017 07:25 7/6/2017 08:48   Albumin  3.6    Protein Total  6.8    Bilirubin Total 2.0 (H) 2.0 (H) 1.2   Alkaline Phosphatase  89    ALT  29    AST  30    Bilirubin Direct  0.4 (H)    WBC 2.0 (L)  2.5 (L)   Hemoglobin 11.6 (L)  12.1   Hematocrit 33.6 (L)  35.8   Platelet Count 82 (L)  101 (L)   RBC Count 3.44 (L)  3.70 (L)   MCV 98  97   MCH 33.7 (H)  32.7   MCHC 34.5  33.8   RDW 13.5  14.1   Diff Method Automated Method  Automated Method   % Neutrophils 64.6  66.1   % Lymphocytes 20.2  18.4   % Monocytes 11.3  9.8   % Eosinophils 3.4  4.9   % Basophils 0.5  0.8   % Immature Granulocytes 0.0  0.0   Nucleated RBCs 0  0   Absolute Neutrophil 1.3 (L)  1.6   Absolute Lymphocytes 0.4 (L)  0.5 (L)   Absolute Monocytes 0.2  0.2   Absolute Eosinophils 0.1  0.1   Absolute Basophils 0.0  0.0   Abs Immature Granulocytes 0.0  0.0   Absolute Nucleated RBC 0.0  0.0     IMPRESSION/PLAN:  Fabienne Lynch is a 47 year old female with rectal carcinoma with mets to liver. Currently being treated with FOLFIRI and bevacizumab.     Oncology. S/P 3 cycles of FOLFIRI and bevacizumab with expected side effects (only fatigue, and nausea controlled with Emend). Dose of 5-FU was modified for C3 per discussion with Dr. Christine, eliminating the bolus due to low ANC 1.3. Reduced dose will continue. Irinotecan will continue at 90 mg/m2. Discussed with patient and  that this chemo regimen will continue for the near future. Their son will be graduating from [employer] in the fall, so they will want to schedule around that event.  - 7/19/17 for labs, CT CAP, and infusion C5.  - 7/24/17 Dr. Christine to review CT    Fatigue. Likely due to cumulative effect of chemo. Discussed taking naps when needed, especially the first days after infusion, and then continuing  with her mild-moderate exercise regimen which seems to give her more energy.    Nausea. Known side-effect of FOLFIRI, and is much improved with added Emend to pre-meds. May also try prochloraperazine and/or lorazepam for additional support.      PATRICIA Santamaria    Patient was seen and examined by me, as noted above. Stefanie GOMEZ acted as a scribe. I have reviewed and edited the above note.

## 2017-07-06 NOTE — MR AVS SNAPSHOT
After Visit Summary   7/6/2017    Fabienne Lynch    MRN: [id number]           Patient Information     Date Of Birth          1970        Visit Information        Provider Department      7/6/2017 9:30 AM UC 30 ATC; UC ONCOLOGY INFUSION [facility]        Today's Diagnoses     Rectal cancer (H)    -  1      [facility] Main Line: [phone]    Call triage nurse with chills and/or temperature greater than or equal to 100.4, uncontrolled nausea/vomiting, diarrhea, constipation, dizziness, shortness of breath, chest pain, bleeding, unexplained bruising, or any new/concerning symptoms, questions/concerns.   If you are having any concerning symptoms or wish to speak to a provider before your next infusion visit, please call your care coordinator or triage to notify them so we can adequately serve you.   Triage Nurse Line: [phone]    If after hours, weekends, or holidays, call main hospital  and ask for Oncology doctor on call @ [phone]               July 2017 Sunday Monday Tuesday Wednesday Thursday Friday Saturday                                 1       2     3     4     5     6     P [facility] DRAW    8:00 AM   (15 min.)    [facility] DRAW   [facility] Lab Draw     [facility] RETURN    8:25 AM   (50 min.)   Becky Interiano PA-C   [facility]     [facility] ONC INFUSION 180    9:30 AM   (180 min.)    ONCOLOGY INFUSION   [facility] 7     8       9     10     11     12     13     14     15       16     17     18     19     [facility] DRAW    7:00 AM   (15 min.)    [facility] DRAW   [facility] Lab Draw     CT CHEST/ABDOMEN/PELVIS W    7:05 AM   (20 min.)   UCCT1   [facility] CT     [facility] ONC INFUSION 180    9:00 AM   (180 min.)    ONCOLOGY INFUSION   [facility] 20     21     22       23     24     [facility] RETURN    2:45 PM   (30 min.)   Nanci  MD Xochitl   [facility] 25     26     27     28     29       30     31 August 2017 Sunday Monday Tuesday Wednesday Thursday Friday Saturday             1     2     3     4     5       6     7     8     9     10     11     12       13     14     15     16     17     18     19       20     21     22     23     24     25     26       27     28     29     30     31                            Lab Results:  Recent Results (from the past 12 hour(s))   CBC with platelets differential    Collection Time: 07/06/17  8:48 AM   Result Value Ref Range    WBC 2.5 (L) 4.0 - 11.0 10e9/L    RBC Count 3.70 (L) 3.8 - 5.2 10e12/L    Hemoglobin 12.1 11.7 - 15.7 g/dL    Hematocrit 35.8 35.0 - 47.0 %    MCV 97 78 - 100 fl    MCH 32.7 26.5 - 33.0 pg    MCHC 33.8 31.5 - 36.5 g/dL    RDW 14.1 10.0 - 15.0 %    Platelet Count 101 (L) 150 - 450 10e9/L    Diff Method Automated Method     % Neutrophils 66.1 %    % Lymphocytes 18.4 %    % Monocytes 9.8 %    % Eosinophils 4.9 %    % Basophils 0.8 %    % Immature Granulocytes 0.0 %    Nucleated RBCs 0 0 /100    Absolute Neutrophil 1.6 1.6 - 8.3 10e9/L    Absolute Lymphocytes 0.5 (L) 0.8 - 5.3 10e9/L    Absolute Monocytes 0.2 0.0 - 1.3 10e9/L    Absolute Eosinophils 0.1 0.0 - 0.7 10e9/L    Absolute Basophils 0.0 0.0 - 0.2 10e9/L    Abs Immature Granulocytes 0.0 0 - 0.4 10e9/L    Absolute Nucleated RBC 0.0    Bilirubin  total    Collection Time: 07/06/17  8:48 AM   Result Value Ref Range    Bilirubin Total 1.2 0.2 - 1.3 mg/dL   Protein qualitative urine    Collection Time: 07/06/17  8:48 AM   Result Value Ref Range    Protein Albumin Urine Negative NEG mg/dL             Follow-ups after your visit        Your next 10 appointments already scheduled     Jul 19, 2017  7:00 AM T   [facility] Draw with  [facility] DRAW   [facility] Draw ([facility] [place])    [address] [place] [address]  [id number] [phone]            Jul 19, 2017  7:20 AM CDT   (Arrive by 7:05 AM)   CT CHEST/ABDOMEN/PELVIS W CONTRAST with UCCT1   [facility] CT ([facility] [place])    [address]  [address] [facility] [address] [phone]           Please bring any scans or X-rays taken at other hospitals, if similar tests were done. Also bring a list of your medicines, including vitamins, minerals and over-the-counter drugs. It is safest to leave personal items at home.  Be sure to tell your doctor:   If you have any allergies.   If there s any chance you are pregnant.   If you are breastfeeding.   If you have any special needs.  You may have contrast for this exam. To prepare:   Do not eat or drink for 2 hours before your exam. If you need to take medicine, you may take it with small sips of water. (We may ask you to take liquid medicine as well.)   The day before your exam, drink extra fluids at least six 8-ounce glasses (unless your doctor tells you to restrict your fluids).  Patients over 70 or patients with diabetes or kidney problems:   If you haven t had a blood test (creatinine test) within the last 30 days, go to your clinic or Diagnostic Imaging Department for this test.  If you have diabetes:   If your kidney function is normal, continue taking your metformin (Avandamet, Glucophage, Glucovance, Metaglip) on the day of your exam.   If your kidney function is abnormal, wait 48 hours before restarting this medicine.  You will have oral contrast for this exam:   You will drink the contrast at home. Get this from your clinic or Diagnostic Imaging Department. Please follow the directions given.  Please wear loose clothing, such as a sweat suit or jogging clothes. Avoid snaps, zippers and other metal. We may ask you to undress and put on a hospital gown.  If you have any questions, please call the Imaging Department where you will have your exam.            Jul 19, 2017   9:00 AM CDT   Infusion 180 with UC ONCOLOGY INFUSION, UC 24 ATC   [facility] ([facility])    [address]  [address] [facility] [address] [phone]            Jul 24, 2017  3:00 PM CDT   (Arrive by 2:45 PM)   Return Visit with Xochitl Christine MD   [facility] ([facility])    [address]  [address] [facility] [address] [phone]              Who to contact     If you have questions or need follow up information about today's clinic visit or your schedule please contact [facility] directly at [phone].  Normal or non-critical lab and imaging results will be communicated to you by Lorehart, letter or phone within 4 business days after the clinic has received the results. If you do not hear from us within 7 days, please contact the clinic through Lorehart or phone. If you have a critical or abnormal lab result, we will notify you by phone as soon as possible.  Submit refill requests through Canonical or call your pharmacy and they will forward the refill request to us. Please allow 3 business days for your refill to be completed.          Additional Information About Your Visit        Canonical Information     Canonical gives you secure access to your electronic health record. If you see a primary care provider, you can also send messages to your care team and make appointments. If you have questions, please call your primary care clinic.  If you do not have a primary care provider, please call [phone] and they will assist you.        Care EveryWhere ID     This is your Care EveryWhere ID. This could be used by other organizations to access your [place] medical records  [id number]         Blood Pressure from Last 3 Encounters:   07/06/17 103/71   06/21/17 105/72   06/07/17 102/74    Weight from Last 3 Encounters:   07/06/17 58.2 kg (128 lb 4.8 oz)   06/21/17 58.1 kg (128 lb  1.6 oz)   06/07/17 58.2 kg (128 lb 6.4 oz)              We Performed the Following     Bilirubin  total     CBC with platelets differential     Protein qualitative urine        Primary Care Provider Office Phone # Fax #    Golden Quinteros [phone]       [facility] [address]  [facility] [address]        Equal Access to Services     [facility]: Hadii aad ku hadasho Soomaali, waaxda luqadaha, qaybta kaalmada adeegyada, waxay idiin hayaan adeeg khalvin lamiladyn . So [facility] [phone].    ATENCIÓN: Si habla español, tiene a sewell disposición servicios gratuitos de asistencia lingüística. Llame al [phone].    We comply with applicable federal civil rights laws and [place] laws. We do not discriminate on the basis of race, color, national origin, age, disability sex, sexual orientation or gender identity.            Thank you!     Thank you for choosing [facility]  for your care. Our goal is always to provide you with excellent care. Hearing back from our patients is one way we can continue to improve our services. Please take a few minutes to complete the written survey that you may receive in the mail after your visit with us. Thank you!             Your Updated Medication List - Protect others around you: Learn how to safely use, store and throw away your medicines at www.disposemymeds.org.          This list is accurate as of: 7/6/17  1:32 PM.  Always use your most recent med list.                   Brand Name Dispense Instructions for use Diagnosis    loperamide 2 MG capsule    IMODIUM    30 capsule    Start with 2 caps (4 mg), then take one cap (2 mg) after each diarrheal stool as needed. Do not use more than 8 caps (16 mg) per day.    Rectal cancer (H)       LORazepam 0.5 MG tablet    ATIVAN    30 tablet    Take 1 tablet (0.5 mg) by mouth every 4 hours as needed (Anxiety, Nausea/Vomiting or Sleep)    Rectal cancer (H)       ondansetron 8 MG tablet    ZOFRAN    10  tablet    Take 1 tablet (8 mg) by mouth every 8 hours as needed (Nausea/Vomiting)    Rectal cancer (H)       prochlorperazine 10 MG tablet    COMPAZINE    30 tablet    Take 1 tablet (10 mg) by mouth every 6 hours as needed (Nausea/Vomiting)    Rectal cancer (H)       TYLENOL PO      Take 500 mg by mouth every 6 hours as needed Reported on 4/12/2017

## 2017-07-06 NOTE — PATIENT INSTRUCTIONS
[facility] Main Line: [phone]    Call triage nurse with chills and/or temperature greater than or equal to 100.4, uncontrolled nausea/vomiting, diarrhea, constipation, dizziness, shortness of breath, chest pain, bleeding, unexplained bruising, or any new/concerning symptoms, questions/concerns.   If you are having any concerning symptoms or wish to speak to a provider before your next infusion visit, please call your care coordinator or triage to notify them so we can adequately serve you.   Triage Nurse Line: [phone]    If after hours, weekends, or holidays, call main hospital  and ask for Oncology doctor on call @ [phone]               July 2017 Sunday Monday Tuesday Wednesday Thursday Friday Saturday                                 1       2     3     4     5     6     [facility] DRAW    8:00 AM   (15 min.)    [facility] LAB DRAW   [facility] Lab Draw     UMP RETURN    8:25 AM   (50 min.)   Becky Interiano PA-C   [facility]     UMP ONC INFUSION 180    9:30 AM   (180 min.)    ONCOLOGY INFUSION   [facility] 7     8       9     10     11     12     13     14     15       16     17     18     19     [facility] DRAW    7:00 AM   (15 min.)    [facility] LAB DRAW   [facility] Lab Draw     CT CHEST/ABDOMEN/PELVIS W    7:05 AM   (20 min.)   UCCT1   [facility] CT     [facility] ONC INFUSION 180    9:00 AM   (180 min.)    ONCOLOGY INFUSION   [facility] 20     21     22       23     24     [facility] RETURN    2:45 PM   (30 min.)   Xochitl Christine MD   [facility] 25     26     27     28     29       30     31 August 2017 Sunday Monday Tuesday Wednesday Thursday Friday Saturday             1     2     3     4     5       6     7     8     9     10     11     12       13     14     15     16     17     18     19       20     21     22      23     24     25     26       27     28     29     30     31                            Lab Results:  Recent Results (from the past 12 hour(s))   CBC with platelets differential    Collection Time: 07/06/17  8:48 AM   Result Value Ref Range    WBC 2.5 (L) 4.0 - 11.0 10e9/L    RBC Count 3.70 (L) 3.8 - 5.2 10e12/L    Hemoglobin 12.1 11.7 - 15.7 g/dL    Hematocrit 35.8 35.0 - 47.0 %    MCV 97 78 - 100 fl    MCH 32.7 26.5 - 33.0 pg    MCHC 33.8 31.5 - 36.5 g/dL    RDW 14.1 10.0 - 15.0 %    Platelet Count 101 (L) 150 - 450 10e9/L    Diff Method Automated Method     % Neutrophils 66.1 %    % Lymphocytes 18.4 %    % Monocytes 9.8 %    % Eosinophils 4.9 %    % Basophils 0.8 %    % Immature Granulocytes 0.0 %    Nucleated RBCs 0 0 /100    Absolute Neutrophil 1.6 1.6 - 8.3 10e9/L    Absolute Lymphocytes 0.5 (L) 0.8 - 5.3 10e9/L    Absolute Monocytes 0.2 0.0 - 1.3 10e9/L    Absolute Eosinophils 0.1 0.0 - 0.7 10e9/L    Absolute Basophils 0.0 0.0 - 0.2 10e9/L    Abs Immature Granulocytes 0.0 0 - 0.4 10e9/L    Absolute Nucleated RBC 0.0    Bilirubin  total    Collection Time: 07/06/17  8:48 AM   Result Value Ref Range    Bilirubin Total 1.2 0.2 - 1.3 mg/dL   Protein qualitative urine    Collection Time: 07/06/17  8:48 AM   Result Value Ref Range    Protein Albumin Urine Negative NEG mg/dL

## 2017-07-14 RX ORDER — EPINEPHRINE 0.3 MG/.3ML
0.3 INJECTION SUBCUTANEOUS EVERY 5 MIN PRN
Status: CANCELLED | OUTPATIENT
Start: 2017-07-19

## 2017-07-14 RX ORDER — SODIUM CHLORIDE 9 MG/ML
1000 INJECTION, SOLUTION INTRAVENOUS CONTINUOUS PRN
Status: CANCELLED
Start: 2017-07-19

## 2017-07-14 RX ORDER — METHYLPREDNISOLONE SODIUM SUCCINATE 125 MG/2ML
125 INJECTION, POWDER, LYOPHILIZED, FOR SOLUTION INTRAMUSCULAR; INTRAVENOUS
Status: CANCELLED
Start: 2017-07-19

## 2017-07-14 RX ORDER — ALBUTEROL SULFATE 90 UG/1
1-2 AEROSOL, METERED RESPIRATORY (INHALATION)
Status: CANCELLED
Start: 2017-07-19

## 2017-07-14 RX ORDER — MEPERIDINE HYDROCHLORIDE 25 MG/ML
25 INJECTION INTRAMUSCULAR; INTRAVENOUS; SUBCUTANEOUS EVERY 30 MIN PRN
Status: CANCELLED | OUTPATIENT
Start: 2017-07-19

## 2017-07-14 RX ORDER — DIPHENHYDRAMINE HYDROCHLORIDE 50 MG/ML
50 INJECTION INTRAMUSCULAR; INTRAVENOUS
Status: CANCELLED
Start: 2017-07-19

## 2017-07-14 RX ORDER — LORAZEPAM 2 MG/ML
0.5 INJECTION INTRAMUSCULAR EVERY 4 HOURS PRN
Status: CANCELLED
Start: 2017-07-19

## 2017-07-14 RX ORDER — ALBUTEROL SULFATE 0.83 MG/ML
2.5 SOLUTION RESPIRATORY (INHALATION)
Status: CANCELLED | OUTPATIENT
Start: 2017-07-19

## 2017-07-19 ENCOUNTER — INFUSION THERAPY VISIT (OUTPATIENT)
Dept: ONCOLOGY | Facility: CLINIC | Age: 47
End: 2017-07-19
Attending: INTERNAL MEDICINE
Payer: COMMERCIAL

## 2017-07-19 ENCOUNTER — APPOINTMENT (OUTPATIENT)
Dept: LAB | Facility: CLINIC | Age: 47
End: 2017-07-19
Attending: INTERNAL MEDICINE
Payer: COMMERCIAL

## 2017-07-19 VITALS
RESPIRATION RATE: 16 BRPM | BODY MASS INDEX: 21.63 KG/M2 | WEIGHT: 127 LBS | DIASTOLIC BLOOD PRESSURE: 79 MMHG | HEART RATE: 76 BPM | OXYGEN SATURATION: 97 % | SYSTOLIC BLOOD PRESSURE: 108 MMHG | TEMPERATURE: 98.6 F

## 2017-07-19 DIAGNOSIS — C20 RECTAL CANCER (H): Primary | ICD-10-CM

## 2017-07-19 LAB
ALBUMIN SERPL-MCNC: 3.7 G/DL (ref 3.4–5)
ALBUMIN UR-MCNC: NEGATIVE MG/DL
ALP SERPL-CCNC: 91 U/L (ref 40–150)
ALT SERPL W P-5'-P-CCNC: 34 U/L (ref 0–50)
ANION GAP SERPL CALCULATED.3IONS-SCNC: 7 MMOL/L (ref 3–14)
AST SERPL W P-5'-P-CCNC: 28 U/L (ref 0–45)
BASOPHILS # BLD AUTO: 0 10E9/L (ref 0–0.2)
BASOPHILS NFR BLD AUTO: 0.3 %
BILIRUB SERPL-MCNC: 0.9 MG/DL (ref 0.2–1.3)
BUN SERPL-MCNC: 19 MG/DL (ref 7–30)
CALCIUM SERPL-MCNC: 9.3 MG/DL (ref 8.5–10.1)
CHLORIDE SERPL-SCNC: 107 MMOL/L (ref 94–109)
CO2 SERPL-SCNC: 26 MMOL/L (ref 20–32)
CREAT SERPL-MCNC: 0.71 MG/DL (ref 0.52–1.04)
DIFFERENTIAL METHOD BLD: ABNORMAL
EOSINOPHIL # BLD AUTO: 0.1 10E9/L (ref 0–0.7)
EOSINOPHIL NFR BLD AUTO: 4.2 %
ERYTHROCYTE [DISTWIDTH] IN BLOOD BY AUTOMATED COUNT: 14.4 % (ref 10–15)
GFR SERPL CREATININE-BSD FRML MDRD: 88 ML/MIN/1.7M2
GLUCOSE SERPL-MCNC: 83 MG/DL (ref 70–99)
HCT VFR BLD AUTO: 35.6 % (ref 35–47)
HGB BLD-MCNC: 12.3 G/DL (ref 11.7–15.7)
IMM GRANULOCYTES # BLD: 0 10E9/L (ref 0–0.4)
IMM GRANULOCYTES NFR BLD: 0.3 %
LYMPHOCYTES # BLD AUTO: 0.5 10E9/L (ref 0.8–5.3)
LYMPHOCYTES NFR BLD AUTO: 16.7 %
MCH RBC QN AUTO: 33.4 PG (ref 26.5–33)
MCHC RBC AUTO-ENTMCNC: 34.6 G/DL (ref 31.5–36.5)
MCV RBC AUTO: 97 FL (ref 78–100)
MONOCYTES # BLD AUTO: 0.3 10E9/L (ref 0–1.3)
MONOCYTES NFR BLD AUTO: 11.1 %
NEUTROPHILS # BLD AUTO: 1.9 10E9/L (ref 1.6–8.3)
NEUTROPHILS NFR BLD AUTO: 67.4 %
NRBC # BLD AUTO: 0 10*3/UL
NRBC BLD AUTO-RTO: 0 /100
PLATELET # BLD AUTO: 106 10E9/L (ref 150–450)
POTASSIUM SERPL-SCNC: 3.6 MMOL/L (ref 3.4–5.3)
PROT SERPL-MCNC: 7 G/DL (ref 6.8–8.8)
RBC # BLD AUTO: 3.68 10E12/L (ref 3.8–5.2)
SODIUM SERPL-SCNC: 141 MMOL/L (ref 133–144)
WBC # BLD AUTO: 2.9 10E9/L (ref 4–11)

## 2017-07-19 PROCEDURE — 96367 TX/PROPH/DG ADDL SEQ IV INF: CPT

## 2017-07-19 PROCEDURE — 25000125 ZZHC RX 250: Mod: ZF | Performed by: INTERNAL MEDICINE

## 2017-07-19 PROCEDURE — 96415 CHEMO IV INFUSION ADDL HR: CPT

## 2017-07-19 PROCEDURE — 81003 URINALYSIS AUTO W/O SCOPE: CPT | Performed by: INTERNAL MEDICINE

## 2017-07-19 PROCEDURE — 96417 CHEMO IV INFUS EACH ADDL SEQ: CPT

## 2017-07-19 PROCEDURE — 85025 COMPLETE CBC W/AUTO DIFF WBC: CPT | Performed by: PHYSICIAN ASSISTANT

## 2017-07-19 PROCEDURE — 25000128 H RX IP 250 OP 636: Mod: ZF | Performed by: INTERNAL MEDICINE

## 2017-07-19 PROCEDURE — 96411 CHEMO IV PUSH ADDL DRUG: CPT

## 2017-07-19 PROCEDURE — 96375 TX/PRO/DX INJ NEW DRUG ADDON: CPT

## 2017-07-19 PROCEDURE — 96416 CHEMO PROLONG INFUSE W/PUMP: CPT

## 2017-07-19 PROCEDURE — 96413 CHEMO IV INFUSION 1 HR: CPT

## 2017-07-19 PROCEDURE — 80053 COMPREHEN METABOLIC PANEL: CPT | Performed by: PHYSICIAN ASSISTANT

## 2017-07-19 RX ORDER — ATROPINE SULFATE 0.1 MG/ML
0.4 INJECTION INTRAVENOUS
Status: DISCONTINUED | OUTPATIENT
Start: 2017-07-19 | End: 2017-07-19

## 2017-07-19 RX ORDER — HEPARIN SODIUM (PORCINE) LOCK FLUSH IV SOLN 100 UNIT/ML 100 UNIT/ML
500 SOLUTION INTRAVENOUS EVERY 8 HOURS
Status: DISCONTINUED | OUTPATIENT
Start: 2017-07-19 | End: 2017-07-19 | Stop reason: HOSPADM

## 2017-07-19 RX ADMIN — SODIUM CHLORIDE: 9 INJECTION, SOLUTION INTRAVENOUS at 09:41

## 2017-07-19 RX ADMIN — IRINOTECAN HYDROCHLORIDE 150 MG: 40 INJECTION, SOLUTION INTRAVENOUS at 11:01

## 2017-07-19 RX ADMIN — SODIUM CHLORIDE, PRESERVATIVE FREE 500 UNITS: 5 INJECTION INTRAVENOUS at 08:44

## 2017-07-19 RX ADMIN — BEVACIZUMAB 300 MG: 100 INJECTION, SOLUTION INTRAVENOUS at 10:27

## 2017-07-19 RX ADMIN — SODIUM CHLORIDE 150 MG: 9 INJECTION, SOLUTION INTRAVENOUS at 09:54

## 2017-07-19 RX ADMIN — DEXAMETHASONE SODIUM PHOSPHATE: 10 INJECTION, SOLUTION INTRAMUSCULAR; INTRAVENOUS at 09:41

## 2017-07-19 RX ADMIN — ATROPINE SULFATE 0.4 MG: 1 INJECTION, SOLUTION INTRAMUSCULAR; INTRAVENOUS; SUBCUTANEOUS at 10:58

## 2017-07-19 ASSESSMENT — PAIN SCALES - GENERAL: PAINLEVEL: NO PAIN (0)

## 2017-07-19 NOTE — NURSING NOTE
Chief Complaint   Patient presents with     Port Draw     port accessed with 20 gauge 3/4 inch gripper needle, line flushed with NS and heparin. vitals taken      Viviane Diego RN

## 2017-07-19 NOTE — PROGRESS NOTES
"Infusion Nursing Note:  Patient presents today for  Avastin / Irinotecan / Fluorouracil pump connect.    Patient seen by provider today: No    Note: Patient reports she is feeling well today and says she is clinically \"boring\" when asked if she has any new concerns. She is here with her parents and in good spirits. Discussed schedule and follow up appointments / scans, patient verbalized understanding of plan or care.    Intravenous Access:  Implanted Port.    Treatment Conditions:  Lab Results   Component Value Date    HGB 12.3 07/19/2017     Lab Results   Component Value Date    WBC 2.9 07/19/2017      Lab Results   Component Value Date    ANEU 1.9 07/19/2017     Lab Results   Component Value Date     07/19/2017      Lab Results   Component Value Date     07/19/2017                   Lab Results   Component Value Date    POTASSIUM 3.6 07/19/2017           No results found for: MAG         Lab Results   Component Value Date    CR 0.71 07/19/2017                   Lab Results   Component Value Date    ANDRZEJ 9.3 07/19/2017                Lab Results   Component Value Date    BILITOTAL 0.9 07/19/2017           Lab Results   Component Value Date    ALBUMIN 3.7 07/19/2017                    Lab Results   Component Value Date    ALT 34 07/19/2017           Lab Results   Component Value Date    AST 28 07/19/2017     Results reviewed, labs MET treatment parameters, ok to proceed with treatment.  Urine protein negative.    Post Infusion Assessment:  Patient tolerated infusion without incident.  Blood return noted pre and post infusion.  Site patent and intact, free from redness, edema or discomfort.  No evidence of extravasations.    Prior to discharge: Port is secured in place with tegaderm and flushed with 10cc NS with positive blood return noted.  Continuous home infusion pump connected.    All connectors secured in place and clamps taped open.    Patient instructed to call our clinic or [facility] " with any questions or concerns at home.  Patient verbalized understanding.    Patient set up for pump disconnect at home (friend's house in [place]) with [facility] on Friday at 1030.      Discharge Plan:   Patient declined prescription refills.  Discharge instructions reviewed with: Patient.  Patient and/or family verbalized understanding of discharge instructions and all questions answered.  AVS to patient via SendTaskT.  Patient will return 7/25 for CT scan and 7/26 for next appointment with Dr. Christine.   Patient discharged in stable condition accompanied by: self, mother and father.  Departure Mode: Ambulatory.  Face to Face time: 0 minutes.    Sukh Shelby RN.

## 2017-07-19 NOTE — PATIENT INSTRUCTIONS
Contact Numbers    [facility] Main Line: [phone]  [facility] Triage:  [phone]    Call triage with chills and/or temperature greater than or equal to 100.5, uncontrolled nausea/vomiting, diarrhea, constipation, dizziness, shortness of breath, chest pain, bleeding, unexplained bruising, or any new/concerning symptoms, questions/concerns.     If you are having any concerning symptoms or wish to speak to a provider before your next infusion visit, please call your care coordinator or triage to notify them so we can adequately serve you.       After Hours: [phone]    If after hours, weekends, or holidays, call main hospital  and ask for Oncology doctor on call.         July 2017 Sunday Monday Tuesday Wednesday Thursday Friday Saturday                                 1       2     3     4     5     6     [facility]    8:00 AM   (15 min.)    [facility] LAB DRAW   [facility] Lab Draw     [facility] RETURN    8:25 AM   (50 min.)   Becky Interiano PA-C   [facility]     [facility] ONC INFUSION 180    9:30 AM   (180 min.)   [facility] ONCOLOGY INFUSION   [facility] 7     8       9     10     11     12     13     14     15       16     17     18     19     [facility]    8:30 AM   (15 min.)   [facility]   [facility]     [facility] ONC INFUSION 180    9:00 AM   (180 min.)   [facility] ONCOLOGY INFUSION   [facility] 20     21     22       23     24     25     [facility]    6:30 AM   (15 min.)   [facility]   [facility]     CT CHEST/ABDOMEN/PELVIS W    6:45 AM   (20 min.)   UCCT2   [facility] [place] [facility] 26     [facility] RETURN   10:00 AM   (30 min.)   Xochitl Christien MD   [facility] 27     28     29       30     31                                         August 2017 Sunday Monday Tuesday Wednesday Thursday Friday Saturday             1     2     [facility] D[facility]    9:45 AM   (15 min.)     [facility] LAB DRAW   [facility] Lab Draw     [facility] ONC INFUSION 180   10:30 AM   (180 min.)   UC ONCOLOGY INFUSION   [facility] 3     4     5       6     7     8     9     10     11     12       13     14     15     16     17     18     19       20     21     22     23     24     25     26       27     28     29     30     31                            Lab Results:  Recent Results (from the past 12 hour(s))   CBC with platelets differential    Collection Time: 07/19/17  8:50 AM   Result Value Ref Range    WBC 2.9 (L) 4.0 - 11.0 10e9/L    RBC Count 3.68 (L) 3.8 - 5.2 10e12/L    Hemoglobin 12.3 11.7 - 15.7 g/dL    Hematocrit 35.6 35.0 - 47.0 %    MCV 97 78 - 100 fl    MCH 33.4 (H) 26.5 - 33.0 pg    MCHC 34.6 31.5 - 36.5 g/dL    RDW 14.4 10.0 - 15.0 %    Platelet Count 106 (L) 150 - 450 10e9/L    Diff Method Automated Method     % Neutrophils 67.4 %    % Lymphocytes 16.7 %    % Monocytes 11.1 %    % Eosinophils 4.2 %    % Basophils 0.3 %    % Immature Granulocytes 0.3 %    Nucleated RBCs 0 0 /100    Absolute Neutrophil 1.9 1.6 - 8.3 10e9/L    Absolute Lymphocytes 0.5 (L) 0.8 - 5.3 10e9/L    Absolute Monocytes 0.3 0.0 - 1.3 10e9/L    Absolute Eosinophils 0.1 0.0 - 0.7 10e9/L    Absolute Basophils 0.0 0.0 - 0.2 10e9/L    Abs Immature Granulocytes 0.0 0 - 0.4 10e9/L    Absolute Nucleated RBC 0.0    Comprehensive metabolic panel    Collection Time: 07/19/17  8:50 AM   Result Value Ref Range    Sodium 141 133 - 144 mmol/L    Potassium 3.6 3.4 - 5.3 mmol/L    Chloride 107 94 - 109 mmol/L    Carbon Dioxide 26 20 - 32 mmol/L    Anion Gap 7 3 - 14 mmol/L    Glucose 83 70 - 99 mg/dL    Urea Nitrogen 19 7 - 30 mg/dL    Creatinine 0.71 0.52 - 1.04 mg/dL    GFR Estimate 88 >60 mL/min/1.7m2    GFR Estimate If Black >90   GFR Calc   >60 mL/min/1.7m2    Calcium 9.3 8.5 - 10.1 mg/dL    Bilirubin Total 0.9 0.2 - 1.3 mg/dL    Albumin 3.7 3.4 - 5.0 g/dL    Protein Total 7.0 6.8 - 8.8 g/dL    Alkaline  Phosphatase 91 40 - 150 U/L    ALT 34 0 - 50 U/L    AST 28 0 - 45 U/L   Protein qualitative urine    Collection Time: 07/19/17  8:51 AM   Result Value Ref Range    Protein Albumin Urine Negative NEG mg/dL

## 2017-07-19 NOTE — MR AVS SNAPSHOT
After Visit Summary   7/19/2017    Fabienne Lynch    MRN: [id number]           Patient Information     Date Of Birth          1970        Visit Information        Provider Department      7/19/2017 9:00 AM  24 ATC;  ONCOLOGY INFUSION [facility]        Today's Diagnoses     Rectal cancer (H)    -  1      Care Instructions    Contact Numbers    [place] Main Line: [phone]  [place] Triage:  [phone]    Call triage with chills and/or temperature greater than or equal to 100.5, uncontrolled nausea/vomiting, diarrhea, constipation, dizziness, shortness of breath, chest pain, bleeding, unexplained bruising, or any new/concerning symptoms, questions/concerns.     If you are having any concerning symptoms or wish to speak to a provider before your next infusion visit, please call your care coordinator or triage to notify them so we can adequately serve you.       After Hours: [phone]    If after hours, weekends, or holidays, call main hospital  and ask for Oncology doctor on call.         July 2017 Sunday Monday Tuesday Wednesday Thursday Friday Saturday                                 1       2     3     4     5     6     [facility] DRAW    8:00 AM   (15 min.)    [facility] LAB DRAW   [facility] Lab Draw     [facility] RETURN    8:25 AM   (50 min.)   Becky Interiano PA-C   [facility]    9:30 AM   (180 min.)    ONCOLOGY INFUSION   [facility] 7     8       9     10     11     12     13     14     15       16     17     18     19     [facility] DRAW    8:30 AM   (15 min.)    [facility]     [facility] ONC INFUSION 180    9:00 AM   (180 min.)    ONCOLOGY INFUSION   [facility] 20     21     22       23     24     25     [facility] DRAW    6:30 AM   (15 min.)    [facility] DRAW   [facility] Lab Draw     CT CHEST/ABDOMEN/PELVIS W     6:45 AM   (20 min.)   UCCT2   [facility] 26     [facility] RETURN   10:00 AM   (30 min.)   Xochitl Christine MD   [facility] 27     28     29       30     31 August 2017 Sunday Monday Tuesday Wednesday Thursday Friday Saturday             1     2     [facility] DRAW    9:45 AM   (15 min.)    [facility] LAB DRAW   [facility] Lab Draw     [facility] ONC INFUSION 180   10:30 AM   (180 min.)    ONCOLOGY INFUSION   [facility] 3     4     5       6     7     8     9     10     11     12       13     14     15     16     17     18     19       20     21     22     23     24     25     26       27     28     29     30     31                            Lab Results:  Recent Results (from the past 12 hour(s))   CBC with platelets differential    Collection Time: 07/19/17  8:50 AM   Result Value Ref Range    WBC 2.9 (L) 4.0 - 11.0 10e9/L    RBC Count 3.68 (L) 3.8 - 5.2 10e12/L    Hemoglobin 12.3 11.7 - 15.7 g/dL    Hematocrit 35.6 35.0 - 47.0 %    MCV 97 78 - 100 fl    MCH 33.4 (H) 26.5 - 33.0 pg    MCHC 34.6 31.5 - 36.5 g/dL    RDW 14.4 10.0 - 15.0 %    Platelet Count 106 (L) 150 - 450 10e9/L    Diff Method Automated Method     % Neutrophils 67.4 %    % Lymphocytes 16.7 %    % Monocytes 11.1 %    % Eosinophils 4.2 %    % Basophils 0.3 %    % Immature Granulocytes 0.3 %    Nucleated RBCs 0 0 /100    Absolute Neutrophil 1.9 1.6 - 8.3 10e9/L    Absolute Lymphocytes 0.5 (L) 0.8 - 5.3 10e9/L    Absolute Monocytes 0.3 0.0 - 1.3 10e9/L    Absolute Eosinophils 0.1 0.0 - 0.7 10e9/L    Absolute Basophils 0.0 0.0 - 0.2 10e9/L    Abs Immature Granulocytes 0.0 0 - 0.4 10e9/L    Absolute Nucleated RBC 0.0    Comprehensive metabolic panel    Collection Time: 07/19/17  8:50 AM   Result Value Ref Range    Sodium 141 133 - 144 mmol/L    Potassium 3.6 3.4 - 5.3 mmol/L    Chloride 107 94 - 109 mmol/L    Carbon Dioxide 26 20 - 32 mmol/L    Anion Gap 7 3 -  14 mmol/L    Glucose 83 70 - 99 mg/dL    Urea Nitrogen 19 7 - 30 mg/dL    Creatinine 0.71 0.52 - 1.04 mg/dL    GFR Estimate 88 >60 mL/min/1.7m2    GFR Estimate If Black >90   GFR Calc   >60 mL/min/1.7m2    Calcium 9.3 8.5 - 10.1 mg/dL    Bilirubin Total 0.9 0.2 - 1.3 mg/dL    Albumin 3.7 3.4 - 5.0 g/dL    Protein Total 7.0 6.8 - 8.8 g/dL    Alkaline Phosphatase 91 40 - 150 U/L    ALT 34 0 - 50 U/L    AST 28 0 - 45 U/L   Protein qualitative urine    Collection Time: 07/19/17  8:51 AM   Result Value Ref Range    Protein Albumin Urine Negative NEG mg/dL               Follow-ups after your visit        Your next 10 appointments already scheduled     Jul 25, 2017  6:30 AM CDT   [facility] Draw with  [facility] LAB DRAW   [facility] Draw ([place])    [address] [place] [address] [id number] [phone]            Jul 25, 2017  7:00 AM CDT   (Arrive by 6:45 AM)   CT CHEST/ABDOMEN/PELVIS W CONTRAST with UCCT2   [facility] [place] ([place])    [address] [place] [address] [id number] [phone]           Please bring any scans or X-rays taken at other hospitals, if similar tests were done. Also bring a list of your medicines, including vitamins, minerals and over-the-counter drugs. It is safest to leave personal items at home.  Be sure to tell your doctor:   If you have any allergies.   If there s any chance you are pregnant.   If you are breastfeeding.   If you have any special needs.  You may have contrast for this exam. To prepare:   Do not eat or drink for 2 hours before your exam. If you need to take medicine, you may take it with small sips of water. (We may ask you to take liquid medicine as well.)   The day before your exam, drink extra fluids at least six 8-ounce glasses (unless your doctor tells you to restrict your fluids).  Patients over 70 or patients with diabetes or kidney  problems:   If you haven t had a blood test (creatinine test) within the last 30 days, go to your clinic or Diagnostic Imaging Department for this test.  If you have diabetes:   If your kidney function is normal, continue taking your metformin (Avandamet, Glucophage, Glucovance, Metaglip) on the day of your exam.   If your kidney function is abnormal, wait 48 hours before restarting this medicine.  You will have oral contrast for this exam:   You will drink the contrast at home. Get this from your clinic or Diagnostic Imaging Department. Please follow the directions given.  Please wear loose clothing, such as a sweat suit or jogging clothes. Avoid snaps, zippers and other metal. We may ask you to undress and put on a hospital gown.  If you have any questions, please call the Imaging Department where you will have your exam.            Jul 26, 2017 10:15 AM CDT   (Arrive by 10:00 AM)   Return Visit with Xochitl Christine MD   [facility] ([facility])    [address] [place] [address] [phone] [phone]            Aug 02, 2017  9:45 AM CDT   [facility] Lab Draw with  [facility] LAB DRAW   [facility] Lab Draw ([facility])    [address] [place] [address] [id number] [phone]            Aug 02, 2017 10:30 AM CDT   Infusion 180 with  ONCOLOGY INFUSION, UC 22 ATC   [facility] ([facility])    [address] [place] [address] [phone]              Who to contact     If you have questions or need follow up information about today's clinic visit or your schedule please contact [facility] [place] directly at [phone].  Normal or non-critical lab and imaging results will be communicated to you by MyChart, letter or phone within 4 business days after the clinic has received the results. If you do not hear from us within 7  days, please contact the clinic through [employer] or phone. If you have a critical or abnormal lab result, we will notify you by phone as soon as possible.  Submit refill requests through [employer] or call your pharmacy and they will forward the refill request to us. Please allow 3 business days for your refill to be completed.          Additional Information About Your Visit        [employer] Information     [employer] gives you secure access to your electronic health record. If you see a primary care provider, you can also send messages to your care team and make appointments. If you have questions, please call your primary care clinic.  If you do not have a primary care provider, please call [phone] and they will assist you.        Care EveryWhere ID     This is your Care EveryWhere ID. This could be used by other organizations to access your [place] medical records  [id number]        Your Vitals Were     Pulse Temperature Respirations Pulse Oximetry BMI (Body Mass Index)       76 98.6  F (37  C) (Oral) 16 97% 21.63 kg/m2        Blood Pressure from Last 3 Encounters:   07/19/17 108/79   07/06/17 103/71   06/21/17 105/72    Weight from Last 3 Encounters:   07/19/17 57.6 kg (127 lb)   07/06/17 58.2 kg (128 lb 4.8 oz)   06/21/17 58.1 kg (128 lb 1.6 oz)              We Performed the Following     CBC with platelets differential     Comprehensive metabolic panel     Protein qualitative urine        Primary Care Provider Office Phone # Fax #    Golden Quinteros [phone]       [place] [address] [facility] [address]        Equal Access to Services     FANNY SCOTT : Hadii kitty ku audrao Soana, waaxda luqadaha, qaybta kaalmada maribell, gene meeks. So [facility] [phone].    ATENCIÓN: Si habla español, tiene a sewell disposición servicios gratuitos de asistencia lingüística. Llame al [phone].    We comply with applicable federal civil rights laws and [place]  laws. We do not discriminate on the basis of race, color, national origin, age, disability sex, sexual orientation or gender identity.            Thank you!     Thank you for choosing [facility]  for your care. Our goal is always to provide you with excellent care. Hearing back from our patients is one way we can continue to improve our services. Please take a few minutes to complete the written survey that you may receive in the mail after your visit with us. Thank you!             Your Updated Medication List - Protect others around you: Learn how to safely use, store and throw away your medicines at www.disposemymeds.org.          This list is accurate as of: 7/19/17  2:09 PM.  Always use your most recent med list.                   Brand Name Dispense Instructions for use Diagnosis    loperamide 2 MG capsule    IMODIUM    30 capsule    Start with 2 caps (4 mg), then take one cap (2 mg) after each diarrheal stool as needed. Do not use more than 8 caps (16 mg) per day.    Rectal cancer (H)       LORazepam 0.5 MG tablet    ATIVAN    30 tablet    Take 1 tablet (0.5 mg) by mouth every 4 hours as needed (Anxiety, Nausea/Vomiting or Sleep)    Rectal cancer (H)       ondansetron 8 MG tablet    ZOFRAN    10 tablet    Take 1 tablet (8 mg) by mouth every 8 hours as needed (Nausea/Vomiting)    Rectal cancer (H)       prochlorperazine 10 MG tablet    COMPAZINE    30 tablet    Take 1 tablet (10 mg) by mouth every 6 hours as needed (Nausea/Vomiting)    Rectal cancer (H)       TYLENOL PO      Take 500 mg by mouth every 6 hours as needed Reported on 4/12/2017

## 2017-07-25 DIAGNOSIS — C20 RECTAL CANCER (H): Primary | ICD-10-CM

## 2017-07-26 ENCOUNTER — ONCOLOGY VISIT (OUTPATIENT)
Dept: ONCOLOGY | Facility: CLINIC | Age: 47
End: 2017-07-26
Attending: INTERNAL MEDICINE
Payer: COMMERCIAL

## 2017-07-26 VITALS
TEMPERATURE: 97.4 F | HEIGHT: 64 IN | BODY MASS INDEX: 21.95 KG/M2 | SYSTOLIC BLOOD PRESSURE: 127 MMHG | HEART RATE: 71 BPM | RESPIRATION RATE: 16 BRPM | OXYGEN SATURATION: 98 % | DIASTOLIC BLOOD PRESSURE: 88 MMHG | WEIGHT: 128.6 LBS

## 2017-07-26 DIAGNOSIS — C20 RECTAL CANCER (H): Primary | ICD-10-CM

## 2017-07-26 PROCEDURE — 99213 OFFICE O/P EST LOW 20 MIN: CPT

## 2017-07-26 PROCEDURE — 99212 OFFICE O/P EST SF 10 MIN: CPT | Mod: ZF

## 2017-07-26 PROCEDURE — 99215 OFFICE O/P EST HI 40 MIN: CPT | Mod: ZP | Performed by: INTERNAL MEDICINE

## 2017-07-26 ASSESSMENT — PAIN SCALES - GENERAL: PAINLEVEL: NO PAIN (0)

## 2017-07-26 NOTE — NURSING NOTE
Met with Fabienne, to discuss chemotherapy change to add Cyramza to her FOLFIRI plan.  Provided patient [employer] printouts.  Reviewed administration, side effects and ongoing symptom support management by APPs in clinic. Provided phone numbers for triage and after hours care. Patient stated understanding.

## 2017-07-26 NOTE — LETTER
7/26/2017       RE: Fbaienne Lynch  [address] [facility] [address]     Dear Colleague,    Thank you for referring your patient, Fabienne Lynch, to the [facility]. Please see a copy of my visit note below.    HEMATOLOGY/ONCOLOGY PROGRESS NOTE  Jul 26, 2017    REASON FOR VISIT: follow-up of rectal carcinoma with mets to liver and lung- KRAS mutant    DIAGNOSIS:   Fabienne Lynch is a 47 year old female with rectal carcinoma with mets to liver. Please refer to Dr. Christine's notes for full history. Summarized briefly here, Fabienne was diagnosed with an obstructing rectal mass.  She was initiated on FOLFOX and then bevacizumab was added later. She had a very good response to the metastatic disease in her liver and also at the primary site. She was switched to 5-FU and Avastin maintenance, but unfortunately at the time of followup scan she was found to have progressive disease at the site of the original tumor. She underwent chemoradiation for the rectal lesion and then took a break because of multiple family events and obligations. At the time of initiation of the chemotherapy with 5-FU, irinotecan,(FOLFIRI) and bevacizumab she had mildly progressive disease.       INTERVAL HISTORY:   Fabienne is here today along with her Friend . Fabienne is feeling well today. She states that with the recent medication adjustment she has been tolerating chemotherapy much better in terms of nausea and vomiting control continues to have fatigue for a few days surrounding chemotherapy administration. She has occasional epistaxis and gum bleeding when brushing her teeth, but both resolve quickly. Fabienne does experience some diarrhea 24h after infusion and then again ~11d later. She has started to take Imodium surrounding these events and has noted improvement in her symptoms. She reports that her neuropathy has completely resolved and has minimal symptoms of hand-foot syndrome with just mild hyperpigmentation and dryness.    "  Otherwise, ROS negative for: fever, chills, sweats, signs of infection, rashes, change in vision or hearing, mucositis, shortness of breath, cough, chest pain, abdominal pain, vomiting, diarrhea, weakness, easy bleeding or bruising, lymphadenopathy, or bone pain.    Current Outpatient Prescriptions   Medication Sig Dispense Refill     Acetaminophen (TYLENOL PO) Take 500 mg by mouth every 6 hours as needed Reported on 4/12/2017          Allergies   Allergen Reactions     Nitrous Oxide Nausea and Vomiting     PHYSICAL EXAMINATION  /88 (BP Location: Right arm, Patient Position: Sitting, Cuff Size: Adult Small)  Pulse 71  Temp 97.4  F (36.3  C) (Oral)  Resp 16  Ht 1.632 m (5' 4.25\")  Wt 58.3 kg (128 lb 9.6 oz)  SpO2 98%  BMI 21.9 kg/m2  Constitutional: Alert, oriented female in no visible distress.  Eyes: PERRL. Anicteric sclerae.  ENT/Mouth: OM moist and pink without lesions or thrush.  Abdomen: Soft, non-tender, non-distended. Bowel sounds present. No masses appreciated. No organomegaly noted.  Extremities: No lower extremity edema appreciated.  Skin: Warm, dry. No bruising or petechiae noted. Small areas of discoloration on hands but no cracking.   Neuro: CN II-XII grossly intact.    LABS:  Results for PHILIP APODACA (MRN [id number]) as of 7/27/2017 13:59   Ref. Range 7/19/2017 08:50   Sodium Latest Ref Range: 133 - 144 mmol/L 141   Potassium Latest Ref Range: 3.4 - 5.3 mmol/L 3.6   Chloride Latest Ref Range: 94 - 109 mmol/L 107   Carbon Dioxide Latest Ref Range: 20 - 32 mmol/L 26   Urea Nitrogen Latest Ref Range: 7 - 30 mg/dL 19   Creatinine Latest Ref Range: 0.52 - 1.04 mg/dL 0.71   GFR Estimate Latest Ref Range: >60 mL/min/1.7m2 88   GFR Estimate If Black Latest Ref Range: >60 mL/min/1.7m2 >90...   Calcium Latest Ref Range: 8.5 - 10.1 mg/dL 9.3   Anion Gap Latest Ref Range: 3 - 14 mmol/L 7   Albumin Latest Ref Range: 3.4 - 5.0 g/dL 3.7   Protein Total Latest Ref Range: 6.8 - 8.8 g/dL 7.0 "   Bilirubin Total Latest Ref Range: 0.2 - 1.3 mg/dL 0.9   Alkaline Phosphatase Latest Ref Range: 40 - 150 U/L 91   ALT Latest Ref Range: 0 - 50 U/L 34   AST Latest Ref Range: 0 - 45 U/L 28   Glucose Latest Ref Range: 70 - 99 mg/dL 83   WBC Latest Ref Range: 4.0 - 11.0 10e9/L 2.9 (L)   Hemoglobin Latest Ref Range: 11.7 - 15.7 g/dL 12.3   Hematocrit Latest Ref Range: 35.0 - 47.0 % 35.6   Platelet Count Latest Ref Range: 150 - 450 10e9/L 106 (L)   RBC Count Latest Ref Range: 3.8 - 5.2 10e12/L 3.68 (L)   MCV Latest Ref Range: 78 - 100 fl 97   MCH Latest Ref Range: 26.5 - 33.0 pg 33.4 (H)   MCHC Latest Ref Range: 31.5 - 36.5 g/dL 34.6   RDW Latest Ref Range: 10.0 - 15.0 % 14.4   Diff Method Unknown Automated Method   % Neutrophils Latest Units: % 67.4   % Lymphocytes Latest Units: % 16.7   % Monocytes Latest Units: % 11.1   % Eosinophils Latest Units: % 4.2   % Basophils Latest Units: % 0.3   % Immature Granulocytes Latest Units: % 0.3   Nucleated RBCs Latest Ref Range: 0 /100 0   Absolute Neutrophil Latest Ref Range: 1.6 - 8.3 10e9/L 1.9   Absolute Lymphocytes Latest Ref Range: 0.8 - 5.3 10e9/L 0.5 (L)   Absolute Monocytes Latest Ref Range: 0.0 - 1.3 10e9/L 0.3   Absolute Eosinophils Latest Ref Range: 0.0 - 0.7 10e9/L 0.1   Absolute Basophils Latest Ref Range: 0.0 - 0.2 10e9/L 0.0   Abs Immature Granulocytes Latest Ref Range: 0 - 0.4 10e9/L 0.0   Absolute Nucleated RBC Unknown 0.0     Imaging:   IMPRESSION:  In this patient with history of metastatic rectal cancer, status post  chemotherapy:  1.  Interval increase in size of previously seen pulmonary nodules,  the largest in the lingula measuring 7 mm compared to 4 mm on  5/17/2017 most compatible with metastatic disease.   2.  Posttreatment changes in the perirectal region with decreased  posterior midline mesorectal fascia nodule. Stable rectal thickening  extending along the right. No evidence of new metastatic disease in  the abdomen or  pelvis.     IMPRESSION/PLAN:  Fabienne Lynch is a 47 year old female with rectal carcinoma with mets to liver. Currently being treated with FOLFIRI and bevacizumab.     Metastatic rectal cancer with metastasis to lung and liver- K-alfredo mutated and normal MMR expression.  Treatment history  Initiated treated with Xeloda ox as there was an ongoing consideration for surgical intervention, she was switched to Xeloda and bevacizumab for maintenance after a very good response. She showed progressive disease at the site of original tumor and was switched to chemoradiation with Xeloda for chemotherapy most recently she has S/P 5cycles of FOLFIRI and bevacizumab with expected side effects .  Her recent CT scan shows progressive disease although the progression was only at one of the foci in her lung metastases where the size of the lesion has grown from 4 mm to 7 mm for the largest lesion but then she has other lesions that are getting bigger but is still subcentimeter. He was accompanied by her friend today and we had a detailed discussion regarding her future treatment plan. We discussed given her K-alfredo mutant status she would not be eligible for EGFR inhibitor based therapy and our next option for standard of care treatment would be 5-FU and irinotecan with Cyramza.    I discussed foundation when testing with them to consider immunotherapy or other targeted therapy  and they are agreeable to this hence we will go ahead and start working with her insurance regarding this .  I also have a message out to my research co-ordinator to review her case for any trials for consideration in future.   She is considering seeking 2nd opinion at [place] and I am totally agreeable and supportive of that and would like to see what trials they may have to offer to her.   In the past we had also discussed TIL therapy trial at [facility] and encouraged them t o look further into that if they are interested.     I spent 35 minutes in the care of this  patient >50% of which was spent in coordinating and counseling.      Xochitl Christine   of Medicine   Hematology and medical Oncology   [facility]

## 2017-07-26 NOTE — NURSING NOTE
"Oncology Rooming Note    July 26, 2017 10:19 AM   Fabienne Lynch is a 47 year old female who presents for:    Chief Complaint   Patient presents with     Oncology Clinic Visit     Return visit related to rectal cancer     Initial Vitals: /88 (BP Location: Right arm, Patient Position: Sitting, Cuff Size: Adult Small)  Pulse 71  Temp 97.4  F (36.3  C) (Oral)  Resp 16  Ht 1.632 m (5' 4.25\")  Wt 58.3 kg (128 lb 9.6 oz)  SpO2 98%  BMI 21.9 kg/m2 Estimated body mass index is 21.9 kg/(m^2) as calculated from the following:    Height as of this encounter: 1.632 m (5' 4.25\").    Weight as of this encounter: 58.3 kg (128 lb 9.6 oz). Body surface area is 1.63 meters squared.  No Pain (0) Comment: Data Unavailable   No LMP recorded. Patient has had a hysterectomy.  Allergies reviewed: Yes  Medications reviewed: Yes    Medications: Medication refills not needed today.  Pharmacy name entered into [employer]:    [facility] - [facility] - [address] [place] [id number] [place], [place] - [address] [place] [address]  [facility] [place], [place] - [phone]    Clinical concerns: No new concerns Provider was NOT notified.    10 minutes for nursing intake (face to face time)     Jeri Olivera LPN              "

## 2017-07-26 NOTE — MR AVS SNAPSHOT
After Visit Summary   7/26/2017    Fabienne Lynch    MRN: [id number]           Patient Information     Date Of Birth          1970        Visit Information        Provider Department      7/26/2017 10:15 AM Xochitl Christine MD [facility]        Today's Diagnoses     Rectal cancer (H)    -  1       Follow-ups after your visit        Your next 10 appointments already scheduled     Aug 02, 2017  9:45 AM CDT   [facility] Lab Draw with [facility] LAB DRAW   [facility] Lab Draw ([facility])    [address] [place] [id number] [phone]            Aug 02, 2017 10:30 AM CDT   Infusion 180 with UC ONCOLOGY INFUSION, UC 22 ATC   [facility] ([facility])    [address] [place] [id number] [phone]            Aug 17, 2017  9:00 AM CDT   [facility] Lab Draw with UC [facility] LAB DRAW   [facility] Lab Draw ([facility])    [address] [place] [id number] [phone]            Aug 17, 2017  9:30 AM CDT   (Arrive by 9:15 AM)   Return Visit with Becky Interiano PA-C   [facility] ([facility])    [address] [place] [id number] [phone]            Aug 17, 2017 10:30 AM CDT   Infusion 180 with UC ONCOLOGY INFUSION, UC 21 ATC   [facility] ([facility])    [address] [place] [phone]              Who to contact     If you have questions or need follow up information about today's clinic visit or your schedule please contact [facility] directly at [phone].  Normal or non-critical lab and imaging results will be communicated to you by MyChart, letter or phone within 4 business days after the clinic has received the  "results. If you do not hear from us within 7 days, please contact the clinic through [employer] or phone. If you have a critical or abnormal lab result, we will notify you by phone as soon as possible.  Submit refill requests through [employer] or call your pharmacy and they will forward the refill request to us. Please allow 3 business days for your refill to be completed.          Additional Information About Your Visit        [employer] Information     [employer] gives you secure access to your electronic health record. If you see a primary care provider, you can also send messages to your care team and make appointments. If you have questions, please call your primary care clinic.  If you do not have a primary care provider, please call [phone] and they will assist you.        Care EveryWhere ID     This is your Care EveryWhere ID. This could be used by other organizations to access your [facility] medical records  [id number]        Your Vitals Were     Pulse Temperature Respirations Height Pulse Oximetry BMI (Body Mass Index)    71 97.4  F (36.3  C) (Oral) 16 1.632 m (5' 4.25\") 98% 21.9 kg/m2       Blood Pressure from Last 3 Encounters:   07/26/17 127/88   07/19/17 108/79   07/06/17 103/71    Weight from Last 3 Encounters:   07/26/17 58.3 kg (128 lb 9.6 oz)   07/19/17 57.6 kg (127 lb)   07/06/17 58.2 kg (128 lb 4.8 oz)              Today, you had the following     No orders found for display       Primary Care Provider Office Phone # Fax #    Golden Quinteros [phone]       [facility] [address]  [facility] [address]        Equal Access to Services     FANNY SCOTT : Hadandie Gupta, gene mcdonnell. So [facility] [phone].    ATENCIÓN: Si habla español, tiene a sewell disposición servicios gratuitos de asistencia lingüística. Llame al [phone].    We comply with applicable federal civil rights laws and " [place] laws. We do not discriminate on the basis of race, color, national origin, age, disability sex, sexual orientation or gender identity.            Thank you!     Thank you for choosing [facility] CANCER [facility]  for your care. Our goal is always to provide you with excellent care. Hearing back from our patients is one way we can continue to improve our services. Please take a few minutes to complete the written survey that you may receive in the mail after your visit with us. Thank you!             Your Updated Medication List - Protect others around you: Learn how to safely use, store and throw away your medicines at www.disposemymeds.org.          This list is accurate as of: 7/26/17 11:59 PM.  Always use your most recent med list.                   Brand Name Dispense Instructions for use Diagnosis    TYLENOL PO      Take 500 mg by mouth every 6 hours as needed Reported on 4/12/2017

## 2017-07-27 NOTE — PROGRESS NOTES
HEMATOLOGY/ONCOLOGY PROGRESS NOTE  Jul 26, 2017    REASON FOR VISIT: follow-up of rectal carcinoma with mets to liver and lung- KRAS mutant    DIAGNOSIS:   Fabienne Lynch is a 47 year old female with rectal carcinoma with mets to liver. Please refer to Dr. Christine's notes for full history. Summarized briefly here, Fabienne was diagnosed with an obstructing rectal mass.  She was initiated on FOLFOX and then bevacizumab was added later. She had a very good response to the metastatic disease in her liver and also at the primary site. She was switched to 5-FU and Avastin maintenance, but unfortunately at the time of followup scan she was found to have progressive disease at the site of the original tumor. She underwent chemoradiation for the rectal lesion and then took a break because of multiple family events and obligations. At the time of initiation of the chemotherapy with 5-FU, irinotecan,(FOLFIRI) and bevacizumab she had mildly progressive disease.       INTERVAL HISTORY:   Fabienne is here today along with her Friend . Fabienne is feeling well today. She states that with the recent medication adjustment she has been tolerating chemotherapy much better in terms of nausea and vomiting control continues to have fatigue for a few days surrounding chemotherapy administration. She has occasional epistaxis and gum bleeding when brushing her teeth, but both resolve quickly. Fabienne does experience some diarrhea 24h after infusion and then again ~11d later. She has started to take Imodium surrounding these events and has noted improvement in her symptoms. She reports that her neuropathy has completely resolved and has minimal symptoms of hand-foot syndrome with just mild hyperpigmentation and dryness.     Otherwise, ROS negative for: fever, chills, sweats, signs of infection, rashes, change in vision or hearing, mucositis, shortness of breath, cough, chest pain, abdominal pain, vomiting, diarrhea, weakness, easy bleeding or bruising,  "lymphadenopathy, or bone pain.    Current Outpatient Prescriptions   Medication Sig Dispense Refill     Acetaminophen (TYLENOL PO) Take 500 mg by mouth every 6 hours as needed Reported on 4/12/2017          Allergies   Allergen Reactions     Nitrous Oxide Nausea and Vomiting     PHYSICAL EXAMINATION  /88 (BP Location: Right arm, Patient Position: Sitting, Cuff Size: Adult Small)  Pulse 71  Temp 97.4  F (36.3  C) (Oral)  Resp 16  Ht 1.632 m (5' 4.25\")  Wt 58.3 kg (128 lb 9.6 oz)  SpO2 98%  BMI 21.9 kg/m2  Constitutional: Alert, oriented female in no visible distress.  Eyes: PERRL. Anicteric sclerae.  ENT/Mouth: OM moist and pink without lesions or thrush.  Abdomen: Soft, non-tender, non-distended. Bowel sounds present. No masses appreciated. No organomegaly noted.  Extremities: No lower extremity edema appreciated.  Skin: Warm, dry. No bruising or petechiae noted. Small areas of discoloration on hands but no cracking.   Neuro: CN II-XII grossly intact.    LABS:  Results for PHILIP APODACA (MRN [id number]) as of 7/27/2017 13:59   Ref. Range 7/19/2017 08:50   Sodium Latest Ref Range: 133 - 144 mmol/L 141   Potassium Latest Ref Range: 3.4 - 5.3 mmol/L 3.6   Chloride Latest Ref Range: 94 - 109 mmol/L 107   Carbon Dioxide Latest Ref Range: 20 - 32 mmol/L 26   Urea Nitrogen Latest Ref Range: 7 - 30 mg/dL 19   Creatinine Latest Ref Range: 0.52 - 1.04 mg/dL 0.71   GFR Estimate Latest Ref Range: >60 mL/min/1.7m2 88   GFR Estimate If Black Latest Ref Range: >60 mL/min/1.7m2 >90...   Calcium Latest Ref Range: 8.5 - 10.1 mg/dL 9.3   Anion Gap Latest Ref Range: 3 - 14 mmol/L 7   Albumin Latest Ref Range: 3.4 - 5.0 g/dL 3.7   Protein Total Latest Ref Range: 6.8 - 8.8 g/dL 7.0   Bilirubin Total Latest Ref Range: 0.2 - 1.3 mg/dL 0.9   Alkaline Phosphatase Latest Ref Range: 40 - 150 U/L 91   ALT Latest Ref Range: 0 - 50 U/L 34   AST Latest Ref Range: 0 - 45 U/L 28   Glucose Latest Ref Range: 70 - 99 mg/dL 83   WBC " Latest Ref Range: 4.0 - 11.0 10e9/L 2.9 (L)   Hemoglobin Latest Ref Range: 11.7 - 15.7 g/dL 12.3   Hematocrit Latest Ref Range: 35.0 - 47.0 % 35.6   Platelet Count Latest Ref Range: 150 - 450 10e9/L 106 (L)   RBC Count Latest Ref Range: 3.8 - 5.2 10e12/L 3.68 (L)   MCV Latest Ref Range: 78 - 100 fl 97   MCH Latest Ref Range: 26.5 - 33.0 pg 33.4 (H)   MCHC Latest Ref Range: 31.5 - 36.5 g/dL 34.6   RDW Latest Ref Range: 10.0 - 15.0 % 14.4   Diff Method Unknown Automated Method   % Neutrophils Latest Units: % 67.4   % Lymphocytes Latest Units: % 16.7   % Monocytes Latest Units: % 11.1   % Eosinophils Latest Units: % 4.2   % Basophils Latest Units: % 0.3   % Immature Granulocytes Latest Units: % 0.3   Nucleated RBCs Latest Ref Range: 0 /100 0   Absolute Neutrophil Latest Ref Range: 1.6 - 8.3 10e9/L 1.9   Absolute Lymphocytes Latest Ref Range: 0.8 - 5.3 10e9/L 0.5 (L)   Absolute Monocytes Latest Ref Range: 0.0 - 1.3 10e9/L 0.3   Absolute Eosinophils Latest Ref Range: 0.0 - 0.7 10e9/L 0.1   Absolute Basophils Latest Ref Range: 0.0 - 0.2 10e9/L 0.0   Abs Immature Granulocytes Latest Ref Range: 0 - 0.4 10e9/L 0.0   Absolute Nucleated RBC Unknown 0.0     Imaging:   IMPRESSION:  In this patient with history of metastatic rectal cancer, status post  chemotherapy:  1.  Interval increase in size of previously seen pulmonary nodules,  the largest in the lingula measuring 7 mm compared to 4 mm on  5/17/2017 most compatible with metastatic disease.   2.  Posttreatment changes in the perirectal region with decreased  posterior midline mesorectal fascia nodule. Stable rectal thickening  extending along the right. No evidence of new metastatic disease in  the abdomen or pelvis.     IMPRESSION/PLAN:  Fabienne Lynch is a 47 year old female with rectal carcinoma with mets to liver. Currently being treated with FOLFIRI and bevacizumab.     Metastatic rectal cancer with metastasis to lung and liver- K-alfredo mutated and normal MMR  expression.  Treatment history  Initiated treated with Xeloda ox as there was an ongoing consideration for surgical intervention, she was switched to Xeloda and bevacizumab for maintenance after a very good response. She showed progressive disease at the site of original tumor and was switched to chemoradiation with Xeloda for chemotherapy most recently she has S/P 5cycles of FOLFIRI and bevacizumab with expected side effects .  Her recent CT scan shows progressive disease although the progression was only at one of the foci in her lung metastases where the size of the lesion has grown from 4 mm to 7 mm for the largest lesion but then she has other lesions that are getting bigger but is still subcentimeter. He was accompanied by her friend today and we had a detailed discussion regarding her future treatment plan. We discussed given her K-alfredo mutant status she would not be eligible for EGFR inhibitor based therapy and our next option for standard of care treatment would be 5-FU and irinotecan with Cyramza.    I discussed foundation when testing with them to consider immunotherapy or other targeted therapy  and they are agreeable to this hence we will go ahead and start working with her insurance regarding this .  I also have a message out to my research co-ordinator to review her case for any trials for consideration in future.   She is considering seeking 2nd opinion at [place] and I am totally agreeable and supportive of that and would like to see what trials they may have to offer to her.   In the past we had also discussed TIL therapy trial at [facility] and encouraged them t o look further into that if they are interested.     I spent 35 minutes in the care of this patient >50% of which was spent in coordinating and counseling.      Xochitl Christine   of Medicine   Hematology and medical Oncology   [facility]

## 2017-07-31 RX ORDER — EPINEPHRINE 0.3 MG/.3ML
0.3 INJECTION SUBCUTANEOUS EVERY 5 MIN PRN
Status: CANCELLED | OUTPATIENT
Start: 2017-08-02

## 2017-07-31 RX ORDER — LORAZEPAM 2 MG/ML
0.5 INJECTION INTRAMUSCULAR EVERY 4 HOURS PRN
Status: CANCELLED
Start: 2017-08-02

## 2017-07-31 RX ORDER — DIPHENHYDRAMINE HYDROCHLORIDE 50 MG/ML
50 INJECTION INTRAMUSCULAR; INTRAVENOUS
Status: CANCELLED
Start: 2017-08-02

## 2017-07-31 RX ORDER — ALBUTEROL SULFATE 0.83 MG/ML
2.5 SOLUTION RESPIRATORY (INHALATION)
Status: CANCELLED | OUTPATIENT
Start: 2017-08-02

## 2017-07-31 RX ORDER — EPINEPHRINE 1 MG/ML
0.3 INJECTION INTRAMUSCULAR; INTRAVENOUS; SUBCUTANEOUS EVERY 5 MIN PRN
Status: CANCELLED | OUTPATIENT
Start: 2017-08-02

## 2017-07-31 RX ORDER — MEPERIDINE HYDROCHLORIDE 25 MG/ML
25 INJECTION INTRAMUSCULAR; INTRAVENOUS; SUBCUTANEOUS EVERY 30 MIN PRN
Status: CANCELLED | OUTPATIENT
Start: 2017-08-02

## 2017-07-31 RX ORDER — ALBUTEROL SULFATE 90 UG/1
1-2 AEROSOL, METERED RESPIRATORY (INHALATION)
Status: CANCELLED
Start: 2017-08-02

## 2017-07-31 RX ORDER — DIPHENHYDRAMINE HCL 25 MG
50 CAPSULE ORAL ONCE
Status: CANCELLED
Start: 2017-08-02

## 2017-07-31 RX ORDER — METHYLPREDNISOLONE SODIUM SUCCINATE 125 MG/2ML
125 INJECTION, POWDER, LYOPHILIZED, FOR SOLUTION INTRAMUSCULAR; INTRAVENOUS
Status: CANCELLED
Start: 2017-08-02

## 2017-07-31 RX ORDER — SODIUM CHLORIDE 9 MG/ML
1000 INJECTION, SOLUTION INTRAVENOUS CONTINUOUS PRN
Status: CANCELLED
Start: 2017-08-02

## 2017-08-01 ENCOUNTER — MYC MEDICAL ADVICE (OUTPATIENT)
Dept: ONCOLOGY | Facility: CLINIC | Age: 47
End: 2017-08-01

## 2017-08-01 RX ORDER — PROCHLORPERAZINE MALEATE 10 MG
10 TABLET ORAL EVERY 6 HOURS PRN
Qty: 30 TABLET | Refills: 2 | Status: SHIPPED | OUTPATIENT
Start: 2017-08-01 | End: 2017-08-01

## 2017-08-01 RX ORDER — LOPERAMIDE HCL 2 MG
CAPSULE ORAL
Qty: 30 CAPSULE | Refills: 0 | Status: SHIPPED | OUTPATIENT
Start: 2017-08-01 | End: 2017-08-01

## 2017-08-01 RX ORDER — ONDANSETRON 8 MG/1
8 TABLET, FILM COATED ORAL EVERY 8 HOURS PRN
Qty: 10 TABLET | Refills: 2 | Status: SHIPPED | OUTPATIENT
Start: 2017-08-01 | End: 2017-08-01

## 2017-08-01 RX ORDER — LORAZEPAM 0.5 MG/1
0.5 TABLET ORAL EVERY 4 HOURS PRN
Qty: 30 TABLET | Refills: 2 | Status: SHIPPED | OUTPATIENT
Start: 2017-08-01

## 2017-08-01 RX ORDER — LOPERAMIDE HCL 2 MG
CAPSULE ORAL
Qty: 30 CAPSULE | Refills: 0 | Status: SHIPPED | OUTPATIENT
Start: 2017-08-01

## 2017-08-01 RX ORDER — ONDANSETRON 8 MG/1
8 TABLET, FILM COATED ORAL EVERY 8 HOURS PRN
Qty: 10 TABLET | Refills: 2 | Status: SHIPPED | OUTPATIENT
Start: 2017-08-01

## 2017-08-01 RX ORDER — PROCHLORPERAZINE MALEATE 10 MG
10 TABLET ORAL EVERY 6 HOURS PRN
Qty: 30 TABLET | Refills: 2 | Status: SHIPPED | OUTPATIENT
Start: 2017-08-01

## 2017-08-02 ENCOUNTER — APPOINTMENT (OUTPATIENT)
Dept: LAB | Facility: CLINIC | Age: 47
End: 2017-08-02
Attending: INTERNAL MEDICINE
Payer: COMMERCIAL

## 2017-08-02 ENCOUNTER — INFUSION THERAPY VISIT (OUTPATIENT)
Dept: ONCOLOGY | Facility: CLINIC | Age: 47
End: 2017-08-02
Attending: INTERNAL MEDICINE
Payer: COMMERCIAL

## 2017-08-02 VITALS
TEMPERATURE: 97.3 F | WEIGHT: 127.4 LBS | BODY MASS INDEX: 21.7 KG/M2 | OXYGEN SATURATION: 100 % | HEART RATE: 67 BPM | RESPIRATION RATE: 18 BRPM | DIASTOLIC BLOOD PRESSURE: 77 MMHG | SYSTOLIC BLOOD PRESSURE: 108 MMHG

## 2017-08-02 DIAGNOSIS — C20 RECTAL CANCER (H): Primary | ICD-10-CM

## 2017-08-02 LAB
ALBUMIN UR-MCNC: NEGATIVE MG/DL
BASOPHILS # BLD AUTO: 0 10E9/L (ref 0–0.2)
BASOPHILS NFR BLD AUTO: 0.3 %
BILIRUB SERPL-MCNC: 1 MG/DL (ref 0.2–1.3)
DIFFERENTIAL METHOD BLD: ABNORMAL
EOSINOPHIL # BLD AUTO: 0.1 10E9/L (ref 0–0.7)
EOSINOPHIL NFR BLD AUTO: 2.3 %
ERYTHROCYTE [DISTWIDTH] IN BLOOD BY AUTOMATED COUNT: 15.1 % (ref 10–15)
HCT VFR BLD AUTO: 36.2 % (ref 35–47)
HGB BLD-MCNC: 12.1 G/DL (ref 11.7–15.7)
IMM GRANULOCYTES # BLD: 0 10E9/L (ref 0–0.4)
IMM GRANULOCYTES NFR BLD: 0.3 %
LYMPHOCYTES # BLD AUTO: 0.4 10E9/L (ref 0.8–5.3)
LYMPHOCYTES NFR BLD AUTO: 14.7 %
MCH RBC QN AUTO: 32.4 PG (ref 26.5–33)
MCHC RBC AUTO-ENTMCNC: 33.4 G/DL (ref 31.5–36.5)
MCV RBC AUTO: 97 FL (ref 78–100)
MONOCYTES # BLD AUTO: 0.3 10E9/L (ref 0–1.3)
MONOCYTES NFR BLD AUTO: 10.4 %
NEUTROPHILS # BLD AUTO: 2.2 10E9/L (ref 1.6–8.3)
NEUTROPHILS NFR BLD AUTO: 72 %
NRBC # BLD AUTO: 0 10*3/UL
NRBC BLD AUTO-RTO: 0 /100
PLATELET # BLD AUTO: 113 10E9/L (ref 150–450)
RBC # BLD AUTO: 3.73 10E12/L (ref 3.8–5.2)
WBC # BLD AUTO: 3 10E9/L (ref 4–11)

## 2017-08-02 PROCEDURE — 96415 CHEMO IV INFUSION ADDL HR: CPT

## 2017-08-02 PROCEDURE — 96416 CHEMO PROLONG INFUSE W/PUMP: CPT

## 2017-08-02 PROCEDURE — 85025 COMPLETE CBC W/AUTO DIFF WBC: CPT | Performed by: INTERNAL MEDICINE

## 2017-08-02 PROCEDURE — 96417 CHEMO IV INFUS EACH ADDL SEQ: CPT

## 2017-08-02 PROCEDURE — 81003 URINALYSIS AUTO W/O SCOPE: CPT | Performed by: INTERNAL MEDICINE

## 2017-08-02 PROCEDURE — 82247 BILIRUBIN TOTAL: CPT | Performed by: INTERNAL MEDICINE

## 2017-08-02 PROCEDURE — 25000125 ZZHC RX 250: Mod: ZF | Performed by: INTERNAL MEDICINE

## 2017-08-02 PROCEDURE — 25000128 H RX IP 250 OP 636: Mod: ZF | Performed by: INTERNAL MEDICINE

## 2017-08-02 PROCEDURE — 96413 CHEMO IV INFUSION 1 HR: CPT

## 2017-08-02 PROCEDURE — 96367 TX/PROPH/DG ADDL SEQ IV INF: CPT

## 2017-08-02 PROCEDURE — 96375 TX/PRO/DX INJ NEW DRUG ADDON: CPT

## 2017-08-02 RX ORDER — HEPARIN SODIUM (PORCINE) LOCK FLUSH IV SOLN 100 UNIT/ML 100 UNIT/ML
5 SOLUTION INTRAVENOUS EVERY 8 HOURS
Status: DISCONTINUED | OUTPATIENT
Start: 2017-08-02 | End: 2017-08-02 | Stop reason: HOSPADM

## 2017-08-02 RX ORDER — EPINEPHRINE 0.3 MG/.3ML
INJECTION SUBCUTANEOUS
Status: DISCONTINUED
Start: 2017-08-02 | End: 2017-08-02 | Stop reason: WASHOUT

## 2017-08-02 RX ADMIN — SODIUM CHLORIDE 150 MG: 9 INJECTION, SOLUTION INTRAVENOUS at 11:39

## 2017-08-02 RX ADMIN — SODIUM CHLORIDE 461 MG: 9 INJECTION, SOLUTION INTRAVENOUS at 12:11

## 2017-08-02 RX ADMIN — DIPHENHYDRAMINE HYDROCHLORIDE 50 MG: 50 INJECTION INTRAMUSCULAR; INTRAVENOUS at 11:17

## 2017-08-02 RX ADMIN — SODIUM CHLORIDE 250 ML: 9 INJECTION, SOLUTION INTRAVENOUS at 10:59

## 2017-08-02 RX ADMIN — ATROPINE SULFATE 0.4 MG: 0.4 INJECTION INTRAMUSCULAR; INTRAVENOUS; SUBCUTANEOUS at 13:12

## 2017-08-02 RX ADMIN — SODIUM CHLORIDE, PRESERVATIVE FREE 5 ML: 5 INJECTION INTRAVENOUS at 10:00

## 2017-08-02 RX ADMIN — DEXTROSE MONOHYDRATE 280 MG: 50 INJECTION, SOLUTION INTRAVENOUS at 13:14

## 2017-08-02 RX ADMIN — DEXAMETHASONE SODIUM PHOSPHATE: 10 INJECTION, SOLUTION INTRAMUSCULAR; INTRAVENOUS at 10:59

## 2017-08-02 ASSESSMENT — PAIN SCALES - GENERAL: PAINLEVEL: NO PAIN (0)

## 2017-08-02 NOTE — NURSING NOTE
Chief Complaint   Patient presents with     Port Draw     Right power port accessed, labs drawn with UA and sent, flushed with saline and heparin, vitals completed, checked into next appointment.   Maryam Saba,RN

## 2017-08-02 NOTE — PROGRESS NOTES
"Infusion Nursing Note:  Fabienne Lynch presents today for Cycle 1, day 1 Cyramza, Irinotecan and Fluorouracil pump connection.    Patient seen by provider today: No    Note: Patient presents to clinic today feeling well with no questions.  Reviewed new medication teaching for Cyramza, all questions answered.    TORB 8/2/2017 1039 Dr. Christine/DEREK Hadley RN: Add Emend 150mg IV over 20 minutes as premedication to treatment plan.      Intravenous Access:  Implanted Port.    Treatment Conditions:  Lab Results   Component Value Date    HGB 12.1 08/02/2017     Lab Results   Component Value Date    WBC 3.0 08/02/2017      Lab Results   Component Value Date    ANEU 2.2 08/02/2017     Lab Results   Component Value Date     08/02/2017      Lab Results   Component Value Date     07/19/2017                   Lab Results   Component Value Date    POTASSIUM 3.6 07/19/2017           No results found for: MAG         Lab Results   Component Value Date    CR 0.71 07/19/2017                   Lab Results   Component Value Date    ANDRZEJ 9.3 07/19/2017                Lab Results   Component Value Date    BILITOTAL 1.0 08/02/2017           Lab Results   Component Value Date    ALBUMIN 3.7 07/19/2017                    Lab Results   Component Value Date    ALT 34 07/19/2017           Lab Results   Component Value Date    AST 28 07/19/2017     Results reviewed, labs MET treatment parameters, ok to proceed with treatment.  Urine protein: Negative  BP: 108/77    Post Infusion Assessment:  Patient tolerated infusion without incident.  Blood return noted pre and post infusion.  Site patent and intact, free from redness, edema or discomfort.  No evidence of extravasations.    Fluorouracil pump connected per protocol.  Connections taped, temperature sensor taped to skin.  Pump to infuse at 5ml/hr for 46 hours.  Disconnect time of 1230 on 8/4/2017 at \"[place] location\" called to [facility].  Spoke with " Brigida.    Discharge Plan:   Patient declined prescription refills.  Discharge instructions reviewed with: Patient.  Patient and/or family verbalized understanding of discharge instructions and all questions answered.  Copy of AVS reviewed with patient and/or family.  Patient will return 8/17/2017 for next appointment.  Departure Mode: Ambulatory.    Cesia Hadley RN

## 2017-08-02 NOTE — PATIENT INSTRUCTIONS
Contact Numbers    [facility] Main Line: [phone]  [facility] Triage:  [phone]    Call triage with chills and/or temperature greater than or equal to 100.5, uncontrolled nausea/vomiting, diarrhea, constipation, dizziness, shortness of breath, chest pain, bleeding, unexplained bruising, or any new/concerning symptoms, questions/concerns.     If you are having any concerning symptoms or wish to speak to a provider before your next infusion visit, please call your care coordinator or triage to notify them so we can adequately serve you.       After Hours: [phone]    If after hours, weekends, or holidays, call main hospital  and ask for Oncology doctor on call.         August 2017 Sunday Monday Tuesday Wednesday Thursday Friday Saturday             1     2     [facility]    9:45 AM   (15 min.)    [facility]     [facility] ONC INFUSION 180   10:30 AM   (180 min.)    ONCOLOGY INFUSION   [facility] 3     4     5       6     7     8     9     10     11     12       13     14     15     16     17     [facility]    9:00 AM   (15 min.)    [facility]     [facility] RETURN    9:15 AM   (50 min.)   Becky Interiano PA-C   [facility]     [facility] ONC INFUSION 180   10:30 AM   (180 min.)    ONCOLOGY INFUSION   [facility] 18     19       20     21     22     23     24     25     26       27     28     29     30     31 September 2017 Sunday Monday Tuesday Wednesday Thursday Friday Saturday                            1     2       3     4     5     6     7     8     9       10     11     12     13     14     15     16       17     18     19     20     21     22     23       24     25     26     27     28     29     30                  Lab Results:  Recent Results (from the past 12 hour(s))   Protein qualitative urine    Collection Time: 08/02/17  9:57 AM    Result Value Ref Range    Protein Albumin Urine Negative NEG mg/dL   CBC with platelets differential    Collection Time: 08/02/17  9:59 AM   Result Value Ref Range    WBC 3.0 (L) 4.0 - 11.0 10e9/L    RBC Count 3.73 (L) 3.8 - 5.2 10e12/L    Hemoglobin 12.1 11.7 - 15.7 g/dL    Hematocrit 36.2 35.0 - 47.0 %    MCV 97 78 - 100 fl    MCH 32.4 26.5 - 33.0 pg    MCHC 33.4 31.5 - 36.5 g/dL    RDW 15.1 (H) 10.0 - 15.0 %    Platelet Count 113 (L) 150 - 450 10e9/L    Diff Method Automated Method     % Neutrophils 72.0 %    % Lymphocytes 14.7 %    % Monocytes 10.4 %    % Eosinophils 2.3 %    % Basophils 0.3 %    % Immature Granulocytes 0.3 %    Nucleated RBCs 0 0 /100    Absolute Neutrophil 2.2 1.6 - 8.3 10e9/L    Absolute Lymphocytes 0.4 (L) 0.8 - 5.3 10e9/L    Absolute Monocytes 0.3 0.0 - 1.3 10e9/L    Absolute Eosinophils 0.1 0.0 - 0.7 10e9/L    Absolute Basophils 0.0 0.0 - 0.2 10e9/L    Abs Immature Granulocytes 0.0 0 - 0.4 10e9/L    Absolute Nucleated RBC 0.0    Bilirubin  total    Collection Time: 08/02/17  9:59 AM   Result Value Ref Range    Bilirubin Total 1.0 0.2 - 1.3 mg/dL

## 2017-08-02 NOTE — MR AVS SNAPSHOT
After Visit Summary   8/2/2017    Fabienne Lynch    MRN: [id number]           Patient Information     Date Of Birth          1970        Visit Information        Provider Department      8/2/2017 10:30 AM  22 ATC;  ONCOLOGY INFUSION [facility]        Today's Diagnoses     Rectal cancer (H)    -  1      Care Instructions    Contact Numbers    [facility] Main Line: [phone]  [facility] Triage:  [phone]    Call triage with chills and/or temperature greater than or equal to 100.5, uncontrolled nausea/vomiting, diarrhea, constipation, dizziness, shortness of breath, chest pain, bleeding, unexplained bruising, or any new/concerning symptoms, questions/concerns.     If you are having any concerning symptoms or wish to speak to a provider before your next infusion visit, please call your care coordinator or triage to notify them so we can adequately serve you.       After Hours: [phone]    If after hours, weekends, or holidays, call main hospital  and ask for Oncology doctor on call.         August 2017 Sunday Monday Tuesday Wednesday Thursday Friday Saturday             1     2     [facility]    9:45 AM   (15 min.)    [facility] LAB DRAW   [facility] Lab Draw     [facility] ONC INFUSION 180   10:30 AM   (180 min.)    ONCOLOGY INFUSION   [facility] 3     4     5       6     7     8     9     10     11     12       13     14     15     16     17     [facility]    9:00 AM   (15 min.)    [facility] DRAW   [facility] Lab Draw     [facility] RETURN    9:15 AM   (50 min.)   Becky Interiano PA-C   [facility]     [facility] ONC INFUSION 180   10:30 AM   (180 min.)    ONCOLOGY INFUSION   [facility] 18     19       20     21     22     23     24     25     26       27     28     29     30     31 September 2017 Sunday Monday Tuesday Wednesday Thursday Friday Saturday                             1     2       3     4     5     6     7     8     9       10     11     12     13     14     15     16       17     18     19     20     21     22     23       24     25     26     27     28     29     30                  Lab Results:  Recent Results (from the past 12 hour(s))   Protein qualitative urine    Collection Time: 08/02/17  9:57 AM   Result Value Ref Range    Protein Albumin Urine Negative NEG mg/dL   CBC with platelets differential    Collection Time: 08/02/17  9:59 AM   Result Value Ref Range    WBC 3.0 (L) 4.0 - 11.0 10e9/L    RBC Count 3.73 (L) 3.8 - 5.2 10e12/L    Hemoglobin 12.1 11.7 - 15.7 g/dL    Hematocrit 36.2 35.0 - 47.0 %    MCV 97 78 - 100 fl    MCH 32.4 26.5 - 33.0 pg    MCHC 33.4 31.5 - 36.5 g/dL    RDW 15.1 (H) 10.0 - 15.0 %    Platelet Count 113 (L) 150 - 450 10e9/L    Diff Method Automated Method     % Neutrophils 72.0 %    % Lymphocytes 14.7 %    % Monocytes 10.4 %    % Eosinophils 2.3 %    % Basophils 0.3 %    % Immature Granulocytes 0.3 %    Nucleated RBCs 0 0 /100    Absolute Neutrophil 2.2 1.6 - 8.3 10e9/L    Absolute Lymphocytes 0.4 (L) 0.8 - 5.3 10e9/L    Absolute Monocytes 0.3 0.0 - 1.3 10e9/L    Absolute Eosinophils 0.1 0.0 - 0.7 10e9/L    Absolute Basophils 0.0 0.0 - 0.2 10e9/L    Abs Immature Granulocytes 0.0 0 - 0.4 10e9/L    Absolute Nucleated RBC 0.0    Bilirubin  total    Collection Time: 08/02/17  9:59 AM   Result Value Ref Range    Bilirubin Total 1.0 0.2 - 1.3 mg/dL               Follow-ups after your visit        Your next 10 appointments already scheduled     Aug 17, 2017  9:00 AM CDT   [facility] Draw with  [facility] DRAW   [facility] Draw ([facility])    [address] [address] [phone]            Aug 17, 2017  9:30 AM CDT   (Arrive by 9:15 AM)   Return Visit with AGUSTO [facility] ([facility])    [address] [facility] [address] [phone]            Aug 17, 2017 10:30 AM CDT   Infusion 180 with UC ONCOLOGY INFUSION, UC 21 ATC   [facility] ([facility])    [address]  [address] [facility] [id number] [phone]              Who to contact     If you have questions or need follow up information about today's clinic visit or your schedule please contact [facility] CANCER [facility] directly at [phone].  Normal or non-critical lab and imaging results will be communicated to you by [employer], letter or phone within 4 business days after the clinic has received the results. If you do not hear from us within 7 days, please contact the clinic through [employer] or phone. If you have a critical or abnormal lab result, we will notify you by phone as soon as possible.  Submit refill requests through [employer] or call your pharmacy and they will forward the refill request to us. Please allow 3 business days for your refill to be completed.          Additional Information About Your Visit        [employer] Information     [employer] gives you secure access to your electronic health record. If you see a primary care provider, you can also send messages to your care team and make appointments. If you have questions, please call your primary care clinic.  If you do not have a primary care provider, please call [phone] and they will assist you.        Care EveryWhere ID     This is your Care EveryWhere ID. This could be used by other organizations to access your [place] medical records  [id number]        Your Vitals Were     Pulse Temperature Respirations Pulse Oximetry BMI (Body Mass Index)       67 97.3  F (36.3  C) (Oral) 18 100% 21.7 kg/m2        Blood Pressure from Last 3 Encounters:   08/02/17 108/77   07/26/17 127/88   07/19/17 108/79    Weight from Last 3 Encounters:   08/02/17 57.8 kg (127 lb 6.4 oz)   07/26/17 58.3 kg (128 lb  9.6 oz)   07/19/17 57.6 kg (127 lb)              We Performed the Following     Bilirubin  total     CBC with platelets differential     Protein qualitative urine          Today's Medication Changes          These changes are accurate as of: 8/2/17 11:01 AM.  If you have any questions, ask your nurse or doctor.               Start taking these medicines.        Dose/Directions    loperamide 2 MG capsule   Commonly known as:  IMODIUM   Used for:  Rectal cancer (H)        Start with 2 caps (4 mg), then take one cap (2 mg) after each diarrheal stool as needed.   Quantity:  30 capsule   Refills:  0       LORazepam 0.5 MG tablet   Commonly known as:  ATIVAN   Used for:  Rectal cancer (H)        Dose:  0.5 mg   Take 1 tablet (0.5 mg) by mouth every 4 hours as needed (Anxiety, Nausea/Vomiting or Sleep)   Quantity:  30 tablet   Refills:  2       ondansetron 8 MG tablet   Commonly known as:  ZOFRAN   Used for:  Rectal cancer (H)        Dose:  8 mg   Take 1 tablet (8 mg) by mouth every 8 hours as needed (Nausea/Vomiting)   Quantity:  10 tablet   Refills:  2       prochlorperazine 10 MG tablet   Commonly known as:  COMPAZINE   Used for:  Rectal cancer (H)        Dose:  10 mg   Take 1 tablet (10 mg) by mouth every 6 hours as needed (Nausea/Vomiting)   Quantity:  30 tablet   Refills:  2            Where to get your medicines      These medications were sent to [address] [address]    Hours:  TRANSPLANT PHONE NUMBER [phone] Phone:  [phone]     loperamide 2 MG capsule    ondansetron 8 MG tablet    prochlorperazine 10 MG tablet         Some of these will need a paper prescription and others can be bought over the counter.  Ask your nurse if you have questions.     Bring a paper prescription for each of these medications     LORazepam 0.5 MG tablet                Primary Care Provider Office Phone # Fax #     Golden Quinteros [phone]       [facility] [address]  [facility] [address]        Equal Access to Services     FANNY SCOTT : Hadii aad ku hadyonathansherin Madelaineana, bravoboris stoneyenha, brenden denisekaren reyna, gene mann jaredzoey fan laJudepower meeks. So [facility] [phone].    ATENCIÓN: Si habla español, tiene a sewell disposición servicios gratuitos de asistencia lingüística. Llame al [phone].    We comply with applicable federal civil rights laws and [place] laws. We do not discriminate on the basis of race, color, national origin, age, disability sex, sexual orientation or gender identity.            Thank you!     Thank you for choosing [facility]  for your care. Our goal is always to provide you with excellent care. Hearing back from our patients is one way we can continue to improve our services. Please take a few minutes to complete the written survey that you may receive in the mail after your visit with us. Thank you!             Your Updated Medication List - Protect others around you: Learn how to safely use, store and throw away your medicines at www.disposemymeds.org.          This list is accurate as of: 8/2/17 11:01 AM.  Always use your most recent med list.                   Brand Name Dispense Instructions for use Diagnosis    loperamide 2 MG capsule    IMODIUM    30 capsule    Start with 2 caps (4 mg), then take one cap (2 mg) after each diarrheal stool as needed.    Rectal cancer (H)       LORazepam 0.5 MG tablet    ATIVAN    30 tablet    Take 1 tablet (0.5 mg) by mouth every 4 hours as needed (Anxiety, Nausea/Vomiting or Sleep)    Rectal cancer (H)       ondansetron 8 MG tablet    ZOFRAN    10 tablet    Take 1 tablet (8 mg) by mouth every 8 hours as needed (Nausea/Vomiting)    Rectal cancer (H)       prochlorperazine 10 MG tablet    COMPAZINE    30 tablet    Take 1 tablet (10 mg) by mouth every 6 hours as needed (Nausea/Vomiting)    Rectal cancer (H)        TYLENOL PO      Take 500 mg by mouth every 6 hours as needed Reported on 4/12/2017

## 2017-08-03 ENCOUNTER — TELEPHONE (OUTPATIENT)
Dept: ONCOLOGY | Facility: CLINIC | Age: 47
End: 2017-08-03

## 2017-08-03 NOTE — TELEPHONE ENCOUNTER
ONCOLOGY SOCIAL WORK  D) Fabienne is a 47-yr-old woman with metastatic colorectal cancer  I) distress screen for work and/or home and family issues  A/P) P/c to pt.  She is overwhelmed at this time, and is grateful to have an email sent with a list of resources for GI cancer.  Also wanting SW contact info which is included on the lists    Kisha Marquez, LICSW  [phone]

## 2017-08-04 NOTE — PROGRESS NOTES
This is a recent snapshot of the patient's [facility] medical record.  For current drug dose and complete information and questions, call [phone] or In Basket pool, [facility] home infusion ([id number])  CSN Number:  [id number]

## 2017-08-08 ENCOUNTER — TRANSFERRED RECORDS (OUTPATIENT)
Dept: HEALTH INFORMATION MANAGEMENT | Facility: CLINIC | Age: 47
End: 2017-08-08

## 2017-08-09 ENCOUNTER — MYC MEDICAL ADVICE (OUTPATIENT)
Dept: ONCOLOGY | Facility: CLINIC | Age: 47
End: 2017-08-09

## 2017-08-17 ENCOUNTER — TELEPHONE (OUTPATIENT)
Dept: ONCOLOGY | Facility: CLINIC | Age: 47
End: 2017-08-17

## 2017-08-17 ENCOUNTER — TRANSFERRED RECORDS (OUTPATIENT)
Dept: HEALTH INFORMATION MANAGEMENT | Facility: CLINIC | Age: 47
End: 2017-08-17

## 2017-08-17 NOTE — TELEPHONE ENCOUNTER
8/17/2017    Per the patient's request, I called Fabienne to follow up on a request she received from her insurance for a three-generation pedigree.     As Becky Amaya, the genetic counselor Fabienne has worked with, is out of the office until 8/28, I received a voicemail message on 8/16 from nurse Becky Cordova regarding this patient. Per Becky Cordova, Fabienne's insurance company is requesting a three-generation pedigree due to a request for coverage of a next generation sequencing test. Becky Cordova was curious if this insurance request is connected to the testing Becky Amaya ordered or a new test being ordered by Dr. Christine. Per the available information, it appears as though Becky Amaya met with this patient several months ago to give test results and likely did not initiate this request for a pedigree. Also, it appears as though the insurance pre-verification for the testing Becky Amaya ordered is already completed. Thus, it is unclear if this insurance request is related to testing Becky Amaya ordered.    I called Fabienne to review this information and any additional information she may have regarding this insurance request. As she was not available, I left a voicemail message with my contact information.    Loulou Guerrero MS, [facility]  Certified Genetic Counselor  Office: [phone]  Pager: [phone]

## 2017-08-18 ENCOUNTER — TRANSFERRED RECORDS (OUTPATIENT)
Dept: HEALTH INFORMATION MANAGEMENT | Facility: CLINIC | Age: 47
End: 2017-08-18

## 2017-08-19 DIAGNOSIS — C20 RECTAL CANCER (H): Primary | ICD-10-CM

## 2017-08-19 RX ORDER — LORAZEPAM 2 MG/ML
0.5 INJECTION INTRAMUSCULAR EVERY 4 HOURS PRN
Status: CANCELLED
Start: 2017-08-25

## 2017-08-19 RX ORDER — ALBUTEROL SULFATE 90 UG/1
1-2 AEROSOL, METERED RESPIRATORY (INHALATION)
Status: CANCELLED
Start: 2017-08-25

## 2017-08-19 RX ORDER — METHYLPREDNISOLONE SODIUM SUCCINATE 125 MG/2ML
125 INJECTION, POWDER, LYOPHILIZED, FOR SOLUTION INTRAMUSCULAR; INTRAVENOUS
Status: CANCELLED
Start: 2017-08-25

## 2017-08-19 RX ORDER — ALBUTEROL SULFATE 0.83 MG/ML
2.5 SOLUTION RESPIRATORY (INHALATION)
Status: CANCELLED | OUTPATIENT
Start: 2017-08-25

## 2017-08-19 RX ORDER — DIPHENHYDRAMINE HCL 50 MG
50 CAPSULE ORAL ONCE
Status: CANCELLED
Start: 2017-08-25

## 2017-08-19 RX ORDER — EPINEPHRINE 0.3 MG/.3ML
0.3 INJECTION SUBCUTANEOUS EVERY 5 MIN PRN
Status: CANCELLED | OUTPATIENT
Start: 2017-08-25

## 2017-08-19 RX ORDER — SODIUM CHLORIDE 9 MG/ML
1000 INJECTION, SOLUTION INTRAVENOUS CONTINUOUS PRN
Status: CANCELLED
Start: 2017-08-25

## 2017-08-19 RX ORDER — DIPHENHYDRAMINE HYDROCHLORIDE 50 MG/ML
50 INJECTION INTRAMUSCULAR; INTRAVENOUS
Status: CANCELLED
Start: 2017-08-25

## 2017-08-19 RX ORDER — EPINEPHRINE 1 MG/ML
0.3 INJECTION INTRAMUSCULAR; INTRAVENOUS; SUBCUTANEOUS EVERY 5 MIN PRN
Status: CANCELLED | OUTPATIENT
Start: 2017-08-25

## 2017-08-19 RX ORDER — MEPERIDINE HYDROCHLORIDE 25 MG/ML
25 INJECTION INTRAMUSCULAR; INTRAVENOUS; SUBCUTANEOUS EVERY 30 MIN PRN
Status: CANCELLED | OUTPATIENT
Start: 2017-08-25

## 2017-08-25 ENCOUNTER — INFUSION THERAPY VISIT (OUTPATIENT)
Dept: ONCOLOGY | Facility: CLINIC | Age: 47
End: 2017-08-25
Attending: INTERNAL MEDICINE
Payer: COMMERCIAL

## 2017-08-25 ENCOUNTER — ONCOLOGY VISIT (OUTPATIENT)
Dept: ONCOLOGY | Facility: CLINIC | Age: 47
End: 2017-08-25
Attending: PHYSICIAN ASSISTANT
Payer: COMMERCIAL

## 2017-08-25 VITALS
TEMPERATURE: 98.3 F | WEIGHT: 127.6 LBS | RESPIRATION RATE: 16 BRPM | HEART RATE: 83 BPM | OXYGEN SATURATION: 98 % | DIASTOLIC BLOOD PRESSURE: 81 MMHG | BODY MASS INDEX: 21.78 KG/M2 | SYSTOLIC BLOOD PRESSURE: 113 MMHG | HEIGHT: 64 IN

## 2017-08-25 DIAGNOSIS — R19.7 DIARRHEA, UNSPECIFIED TYPE: ICD-10-CM

## 2017-08-25 DIAGNOSIS — C20 RECTAL CANCER (H): Primary | ICD-10-CM

## 2017-08-25 DIAGNOSIS — C20 RECTAL CANCER (H): ICD-10-CM

## 2017-08-25 LAB
ALBUMIN SERPL-MCNC: 3.4 G/DL (ref 3.4–5)
ALBUMIN UR-MCNC: 30 MG/DL
ALP SERPL-CCNC: 107 U/L (ref 40–150)
ALT SERPL W P-5'-P-CCNC: 32 U/L (ref 0–50)
ANION GAP SERPL CALCULATED.3IONS-SCNC: 11 MMOL/L (ref 3–14)
AST SERPL W P-5'-P-CCNC: 29 U/L (ref 0–45)
BASOPHILS # BLD AUTO: 0 10E9/L (ref 0–0.2)
BASOPHILS NFR BLD AUTO: 0.5 %
BILIRUB SERPL-MCNC: 1.1 MG/DL (ref 0.2–1.3)
BUN SERPL-MCNC: 12 MG/DL (ref 7–30)
CALCIUM SERPL-MCNC: 8.9 MG/DL (ref 8.5–10.1)
CEA SERPL-MCNC: 0.7 UG/L (ref 0–2.5)
CHLORIDE SERPL-SCNC: 108 MMOL/L (ref 94–109)
CO2 SERPL-SCNC: 23 MMOL/L (ref 20–32)
CREAT SERPL-MCNC: 0.62 MG/DL (ref 0.52–1.04)
DIFFERENTIAL METHOD BLD: ABNORMAL
EOSINOPHIL # BLD AUTO: 0.3 10E9/L (ref 0–0.7)
EOSINOPHIL NFR BLD AUTO: 6.6 %
ERYTHROCYTE [DISTWIDTH] IN BLOOD BY AUTOMATED COUNT: 14.3 % (ref 10–15)
GFR SERPL CREATININE-BSD FRML MDRD: >90 ML/MIN/1.7M2
GLUCOSE SERPL-MCNC: 90 MG/DL (ref 70–99)
HCT VFR BLD AUTO: 35.2 % (ref 35–47)
HGB BLD-MCNC: 11.9 G/DL (ref 11.7–15.7)
IMM GRANULOCYTES # BLD: 0 10E9/L (ref 0–0.4)
IMM GRANULOCYTES NFR BLD: 0.3 %
LYMPHOCYTES # BLD AUTO: 0.5 10E9/L (ref 0.8–5.3)
LYMPHOCYTES NFR BLD AUTO: 13.2 %
MCH RBC QN AUTO: 32.7 PG (ref 26.5–33)
MCHC RBC AUTO-ENTMCNC: 33.8 G/DL (ref 31.5–36.5)
MCV RBC AUTO: 97 FL (ref 78–100)
MONOCYTES # BLD AUTO: 0.4 10E9/L (ref 0–1.3)
MONOCYTES NFR BLD AUTO: 9.9 %
NEUTROPHILS # BLD AUTO: 2.8 10E9/L (ref 1.6–8.3)
NEUTROPHILS NFR BLD AUTO: 69.5 %
NRBC # BLD AUTO: 0 10*3/UL
NRBC BLD AUTO-RTO: 0 /100
PLATELET # BLD AUTO: 106 10E9/L (ref 150–450)
POTASSIUM SERPL-SCNC: 4 MMOL/L (ref 3.4–5.3)
PROT SERPL-MCNC: 6.9 G/DL (ref 6.8–8.8)
RBC # BLD AUTO: 3.64 10E12/L (ref 3.8–5.2)
SODIUM SERPL-SCNC: 142 MMOL/L (ref 133–144)
WBC # BLD AUTO: 4 10E9/L (ref 4–11)

## 2017-08-25 PROCEDURE — 96367 TX/PROPH/DG ADDL SEQ IV INF: CPT

## 2017-08-25 PROCEDURE — 80053 COMPREHEN METABOLIC PANEL: CPT | Performed by: INTERNAL MEDICINE

## 2017-08-25 PROCEDURE — 96415 CHEMO IV INFUSION ADDL HR: CPT

## 2017-08-25 PROCEDURE — 25000128 H RX IP 250 OP 636: Mod: ZF | Performed by: PHYSICIAN ASSISTANT

## 2017-08-25 PROCEDURE — 99212 OFFICE O/P EST SF 10 MIN: CPT | Mod: ZF

## 2017-08-25 PROCEDURE — 96413 CHEMO IV INFUSION 1 HR: CPT

## 2017-08-25 PROCEDURE — 96375 TX/PRO/DX INJ NEW DRUG ADDON: CPT

## 2017-08-25 PROCEDURE — 25000128 H RX IP 250 OP 636: Mod: ZF | Performed by: INTERNAL MEDICINE

## 2017-08-25 PROCEDURE — 96416 CHEMO PROLONG INFUSE W/PUMP: CPT

## 2017-08-25 PROCEDURE — 82378 CARCINOEMBRYONIC ANTIGEN: CPT | Performed by: PHYSICIAN ASSISTANT

## 2017-08-25 PROCEDURE — 99214 OFFICE O/P EST MOD 30 MIN: CPT | Mod: ZP | Performed by: PHYSICIAN ASSISTANT

## 2017-08-25 PROCEDURE — 81003 URINALYSIS AUTO W/O SCOPE: CPT | Performed by: INTERNAL MEDICINE

## 2017-08-25 PROCEDURE — 85025 COMPLETE CBC W/AUTO DIFF WBC: CPT | Performed by: INTERNAL MEDICINE

## 2017-08-25 RX ORDER — DIPHENOXYLATE HCL/ATROPINE 2.5-.025MG
1-2 TABLET ORAL 4 TIMES DAILY PRN
Qty: 40 TABLET | Refills: 0 | Status: SHIPPED | OUTPATIENT
Start: 2017-08-25

## 2017-08-25 RX ORDER — HEPARIN SODIUM (PORCINE) LOCK FLUSH IV SOLN 100 UNIT/ML 100 UNIT/ML
5 SOLUTION INTRAVENOUS EVERY 8 HOURS
Status: DISCONTINUED | OUTPATIENT
Start: 2017-08-25 | End: 2017-08-25 | Stop reason: HOSPADM

## 2017-08-25 RX ADMIN — ATROPINE SULFATE 0.4 MG: 0.4 INJECTION, SOLUTION INTRAMUSCULAR; INTRAVENOUS; SUBCUTANEOUS at 09:10

## 2017-08-25 RX ADMIN — SODIUM CHLORIDE 250 ML: 9 INJECTION, SOLUTION INTRAVENOUS at 08:27

## 2017-08-25 RX ADMIN — SODIUM CHLORIDE, PRESERVATIVE FREE 5 ML: 5 INJECTION INTRAVENOUS at 06:59

## 2017-08-25 RX ADMIN — SODIUM CHLORIDE 150 MG: 9 INJECTION, SOLUTION INTRAVENOUS at 08:40

## 2017-08-25 RX ADMIN — IRINOTECAN HYDROCHLORIDE 280 MG: 20 INJECTION, SOLUTION INTRAVENOUS at 09:09

## 2017-08-25 RX ADMIN — DEXAMETHASONE SODIUM PHOSPHATE: 10 INJECTION, SOLUTION INTRAMUSCULAR; INTRAVENOUS at 08:27

## 2017-08-25 ASSESSMENT — PAIN SCALES - GENERAL: PAINLEVEL: NO PAIN (0)

## 2017-08-25 NOTE — PATIENT INSTRUCTIONS
Contact numbers:  Triage Main Line: [phone]  After hours: [phone]    Call with chills and/or temperature greater than or equal to 100.5 and questions or concerns.    If after hours, weekends, or holidays, call main hospital  at  [phone] and ask for Oncology doctor on call.           August 2017 Sunday Monday Tuesday Wednesday Thursday Friday Saturday             1     2     [facility]    9:45 AM   (15 min.)   [facility]   [facility] Draw     [facility] ONC INFUSION 180   10:30 AM   (180 min.)    ONCOLOGY INFUSION   [facility] 3     4     5       6     7     8     9     10     11     12       13     14     15     16     17     18     19       20     21     22     23     24     25     [facility] DRAW    6:30 AM   (15 min.)    [facility]     [facility] RETURN    6:45 AM   (50 min.)   Azul Mansfield PA-C   [facility]     [facility] ONC INFUSION 180    8:00 AM   (180 min.)    ONCOLOGY INFUSION   [facility] 26       27     28     29     30     31 September 2017 Sunday Monday Tuesday Wednesday Thursday Friday Saturday                            1     2       3     4     5     6     7     8     9       10     11     12     13     [facility] DRAW    7:15 AM   (15 min.)   [facility] DRAW   [facility] Lab Draw     [facility] RETURN    7:30 AM   (30 min.)   Xochitl Christine MD   [facility]     [facility] ONC INFUSION 180    8:30 AM   (180 min.)   UC ONCOLOGY INFUSION   [facility] 14     15     16       17     18     19     20     21     22     23       24     25     26     27     28     29     30                  Lab Results:  Recent Results (from the past 12 hour(s))   Protein qualitative urine    Collection Time: 08/25/17  7:01 AM   Result Value Ref Range    Protein Albumin Urine 30 (A) NEG^Negative mg/dL   CBC with  platelets differential    Collection Time: 08/25/17  7:02 AM   Result Value Ref Range    WBC 4.0 4.0 - 11.0 10e9/L    RBC Count 3.64 (L) 3.8 - 5.2 10e12/L    Hemoglobin 11.9 11.7 - 15.7 g/dL    Hematocrit 35.2 35.0 - 47.0 %    MCV 97 78 - 100 fl    MCH 32.7 26.5 - 33.0 pg    MCHC 33.8 31.5 - 36.5 g/dL    RDW 14.3 10.0 - 15.0 %    Platelet Count 106 (L) 150 - 450 10e9/L    Diff Method Automated Method     % Neutrophils 69.5 %    % Lymphocytes 13.2 %    % Monocytes 9.9 %    % Eosinophils 6.6 %    % Basophils 0.5 %    % Immature Granulocytes 0.3 %    Nucleated RBCs 0 0 /100    Absolute Neutrophil 2.8 1.6 - 8.3 10e9/L    Absolute Lymphocytes 0.5 (L) 0.8 - 5.3 10e9/L    Absolute Monocytes 0.4 0.0 - 1.3 10e9/L    Absolute Eosinophils 0.3 0.0 - 0.7 10e9/L    Absolute Basophils 0.0 0.0 - 0.2 10e9/L    Abs Immature Granulocytes 0.0 0 - 0.4 10e9/L    Absolute Nucleated RBC 0.0    Bilirubin  total    Collection Time: 08/25/17  7:02 AM   Result Value Ref Range    Bilirubin Total 1.1 0.2 - 1.3 mg/dL   Albumin level    Collection Time: 08/25/17  7:02 AM   Result Value Ref Range    Albumin 3.4 3.4 - 5.0 g/dL   Alkaline phosphatase    Collection Time: 08/25/17  7:02 AM   Result Value Ref Range    Alkaline Phosphatase 107 40 - 150 U/L   ALT    Collection Time: 08/25/17  7:02 AM   Result Value Ref Range    ALT 32 0 - 50 U/L   AST    Collection Time: 08/25/17  7:02 AM   Result Value Ref Range    AST 29 0 - 45 U/L   Basic metabolic panel    Collection Time: 08/25/17  7:02 AM   Result Value Ref Range    Sodium 142 133 - 144 mmol/L    Potassium 4.0 3.4 - 5.3 mmol/L    Chloride 108 94 - 109 mmol/L    Carbon Dioxide 23 20 - 32 mmol/L    Anion Gap 11 3 - 14 mmol/L    Glucose 90 70 - 99 mg/dL    Urea Nitrogen 12 7 - 30 mg/dL    Creatinine 0.62 0.52 - 1.04 mg/dL    GFR Estimate >90 >60 mL/min/1.7m2    GFR Estimate If Black >90 >60 mL/min/1.7m2    Calcium 8.9 8.5 - 10.1 mg/dL   Protein total    Collection Time: 08/25/17  7:02 AM   Result Value  Ref Range    Protein Total 6.9 6.8 - 8.8 g/dL

## 2017-08-25 NOTE — PROGRESS NOTES
HEMATOLOGY/ONCOLOGY PROGRESS NOTE  Aug 25, 2017    REASON FOR VISIT: follow-up of rectal carcinoma with mets to liver and lung- KRAS mutant    DIAGNOSIS:   Fabienne Lynch is a 47 year old female with rectal carcinoma with mets to liver. Please refer to Dr. Christine's notes for full history. Summarized briefly here, Fabienne was diagnosed with an obstructing rectal mass.  She was initiated on FOLFOX and then bevacizumab was added later. She had a very good response to the metastatic disease in her liver and also at the primary site. She was switched to 5-FU and Avastin maintenance, but unfortunately at the time of followup scan she was found to have progressive disease at the site of the original tumor. She underwent chemoradiation for the rectal lesion and then took a break because of multiple family events and obligations. At the time of initiation of the chemotherapy with 5-FU, irinotecan,(FOLFIRI) and bevacizumab she had mildly progressive disease.   In July of 2017 she had mild progression and was recently switched to 5FU + irinotecan + Cyramza.      Fabienne comes in prior to C2.     INTERVAL HISTORY: Fabienne is here with her  today.  She feels this last cycle was much harder than the previous. She was expecting Cyramza to not be much different than her prior Avastin, but she had much worse diarrhea.  Basically for the last three weeks she has diarrhea almost everytime she eats, about an hour later.  She has been unable to exercise and has felt a little home-bound.  She had an extra week off as she had a work up down at [place] and even this last week she continued to have diarrhea.     She also has had a little harder time sleeping, she got a lot of good news down at [place] and its made her head spin.      Otherwise, ROS negative for: fever, chills, sweats, signs of infection, rashes, change in vision or hearing, mucositis, shortness of breath, cough, chest pain, abdominal pain, vomiting, diarrhea, weakness, easy  "bleeding or bruising, lymphadenopathy, or bone pain.    Current Outpatient Prescriptions   Medication Sig Dispense Refill     LORazepam (ATIVAN) 0.5 MG tablet Take 1 tablet (0.5 mg) by mouth every 4 hours as needed (Anxiety, Nausea/Vomiting or Sleep) 30 tablet 2     prochlorperazine (COMPAZINE) 10 MG tablet Take 1 tablet (10 mg) by mouth every 6 hours as needed (Nausea/Vomiting) 30 tablet 2     loperamide (IMODIUM) 2 MG capsule Start with 2 caps (4 mg), then take one cap (2 mg) after each diarrheal stool as needed. 30 capsule 0     ondansetron (ZOFRAN) 8 MG tablet Take 1 tablet (8 mg) by mouth every 8 hours as needed (Nausea/Vomiting) 10 tablet 2     Acetaminophen (TYLENOL PO) Take 500 mg by mouth every 6 hours as needed Reported on 4/12/2017          Allergies   Allergen Reactions     Nitrous Oxide Nausea and Vomiting     PHYSICAL EXAMINATION  /81  Pulse 83  Temp 98.3  F (36.8  C) (Oral)  Resp 16  Ht 1.632 m (5' 4.25\")  Wt 57.9 kg (127 lb 9.6 oz)  SpO2 98%  BMI 21.73 kg/m2  Constitutional: Alert, oriented female in no visible distress.  Eyes: PERRL. Anicteric sclerae.  ENT/Mouth: OM moist and pink without lesions or thrush.  Abdomen: Soft, non-tender, non-distended. Bowel sounds present. No masses appreciated. No organomegaly noted.  Extremities: No lower extremity edema appreciated.  Skin: Warm, dry. No bruising or petechiae noted. Small areas of discoloration on hands but no cracking.   Neuro: CN II-XII grossly intact.    LABS:     8/2/2017 09:59   Bilirubin Total 1.0   WBC 3.0 (L)   Hemoglobin 12.1   Hematocrit 36.2   Platelet Count 113 (L)   RBC Count 3.73 (L)   MCV 97        IMPRESSION/PLAN:  Fabienne Lynch is a 47 year old female with rectal carcinoma with mets to liver. Currently being treated with 5FU + irinotecan + Cyramza.     Metastatic rectal cancer with metastasis to lung and liver- K-alfredo mutated and normal MMR expression.  Treatment history  Initiated treated with XELOX as there was an " ongoing consideration for surgical intervention, then she was switched to Xeloda and bevacizumab for maintenance after a very good response. She showed progressive disease at the site of original tumor and was switched to chemoradiation with Xeloda for chemotherapy.  She most recently underwent 5 cycles of FOLFIRI and bevacizumab with expected side effects .  Her recent CT scan on 7/25/17 showed progressive disease.  She met with Dr Christine and was switched to 5-FU and irinotecan with Cyramza.     Fabienne had significantly worse diarrhea this cycle- for the last 3 weeks everytime she eats, she has a loose stool.  This makes me worried about c diff and we'll get a culture today.  She is really not interested in the Cyramza today- she would just like the 5FU and irinotecan.  I had a long discussion with her that likely its the chemo causing the diarrhea, not the Cyramza, but she would prefer to hold it today.     We will get stool cultures and check a CMP and I gave her lomotil.   I will obtain her notes from [place]- had a PET and a scope showing essentially no disease except her lungs- they are presenting her at tumor board and considering surgery.     Leatha Mansfield PA-C

## 2017-08-25 NOTE — NURSING NOTE
"Oncology Rooming Note    August 25, 2017 7:05 AM   Fabienne Lynch is a 47 year old female who presents for:    Chief Complaint   Patient presents with     Port Draw     Port labs collected.      Oncology Clinic Visit     Return: rectal ca      Initial Vitals: /81  Pulse 83  Temp 98.3  F (36.8  C) (Oral)  Resp 16  Ht 1.632 m (5' 4.25\")  Wt 57.9 kg (127 lb 9.6 oz)  SpO2 98%  BMI 21.73 kg/m2 Estimated body mass index is 21.73 kg/(m^2) as calculated from the following:    Height as of this encounter: 1.632 m (5' 4.25\").    Weight as of this encounter: 57.9 kg (127 lb 9.6 oz). Body surface area is 1.62 meters squared.  No Pain (0) Comment: Data Unavailable   No LMP recorded. Patient has had a hysterectomy.  Allergies reviewed: Yes  Medications reviewed: Yes    Medications: Medication refills not needed today.  Pharmacy name entered into [employer]:    [facility]  [address] - [address]  [facility] [address] [place], [place] - [address] [place] [address]  [facility] - MAIL ORDER ONLY - [facility] - [place], [place] - [phone]    Clinical concerns: no new concerns     6 minutes for nursing intake (face to face time)     Rebeca Villegas CMA                "

## 2017-08-25 NOTE — MR AVS SNAPSHOT
After Visit Summary   8/25/2017    Fabienne Lynch    MRN: [id number]           Patient Information     Date Of Birth          1970        Visit Information        Provider Department      8/25/2017 7:00 AM Azul Mansfield PA-C [facility]        Today's Diagnoses     Rectal cancer (H)    -  1    Diarrhea, unspecified type           Follow-ups after your visit        Your next 10 appointments already scheduled     Sep 13, 2017  7:15 AM CDT   [facility] Draw with [facility] DRAW   [facility] Lab Draw ([facility])    [address] [place] [id number] [phone]            Sep 13, 2017  7:45 AM CDT   (Arrive by 7:30 AM)   Return Visit with Xochitl Christine MD   [facility] ([facility])    [address] [place] [id number] [phone]            Sep 13, 2017  8:30 AM CDT   Infusion 180 with  ONCOLOGY INFUSION, UC 16 ATC   [facility] [place] ([facility])    [address] [place] [id number] [phone]              Future tests that were ordered for you today     Open Future Orders        Priority Expected Expires Ordered    CEA Routine 8/25/2017 8/25/2018 8/25/2017    Clostridium difficile toxin B PCR Routine 8/25/2017 8/25/2018 8/25/2017            Who to contact     If you have questions or need follow up information about today's clinic visit or your schedule please contact [facility] directly at [phone].  Normal or non-critical lab and imaging results will be communicated to you by MyChart, letter or phone within 4 business days after the clinic has received the results. If you do not hear from us within 7 days, please contact the clinic through MyChart or phone. If you have a critical or abnormal lab result, we will notify you by phone as soon as  "possible.  Submit refill requests through [employer] or call your pharmacy and they will forward the refill request to us. Please allow 3 business days for your refill to be completed.          Additional Information About Your Visit        [employer] Information     [employer] gives you secure access to your electronic health record. If you see a primary care provider, you can also send messages to your care team and make appointments. If you have questions, please call your primary care clinic.  If you do not have a primary care provider, please call [phone] and they will assist you.        Care EveryWhere ID     This is your Care EveryWhere ID. This could be used by other organizations to access your [place] medical records  [id number]        Your Vitals Were     Pulse Temperature Respirations Height Pulse Oximetry BMI (Body Mass Index)    83 98.3  F (36.8  C) (Oral) 16 1.632 m (5' 4.25\") 98% 21.73 kg/m2       Blood Pressure from Last 3 Encounters:   08/25/17 113/81   08/02/17 108/77   07/26/17 127/88    Weight from Last 3 Encounters:   08/25/17 57.9 kg (127 lb 9.6 oz)   08/02/17 57.8 kg (127 lb 6.4 oz)   07/26/17 58.3 kg (128 lb 9.6 oz)                 Today's Medication Changes          These changes are accurate as of: 8/25/17  8:16 AM.  If you have any questions, ask your nurse or doctor.               Start taking these medicines.        Dose/Directions    diphenoxylate-atropine 2.5-0.025 MG per tablet   Commonly known as:  LOMOTIL   Used for:  Rectal cancer (H)   Started by:  Azul Mansfield PA-C        Dose:  1-2 tablet   Take 1-2 tablets by mouth 4 times daily as needed for diarrhea   Quantity:  40 tablet   Refills:  0            Where to get your medicines      Some of these will need a paper prescription and others can be bought over the counter.  Ask your nurse if you have questions.     Bring a paper prescription for each of these medications     diphenoxylate-atropine 2.5-0.025 MG per tablet       "          Primary Care Provider Office Phone # Fax #    Golden Quinteros [phone]       [facility] [address]  [facility] [address]        Equal Access to Services     FANNY SCOTT : Surya kitty schwarz cory Sousa, waaxda luqadaha, qaybta kaalmada maribell, gene fan laJudepower meeks. So [facility] [phone].    ATENCIÓN: Si habla español, tiene a sewell disposición servicios gratuitos de asistencia lingüística. Llame al [phone].    We comply with applicable federal civil rights laws and [place] laws. We do not discriminate on the basis of race, color, national origin, age, disability sex, sexual orientation or gender identity.            Thank you!     Thank you for choosing [facility]  for your care. Our goal is always to provide you with excellent care. Hearing back from our patients is one way we can continue to improve our services. Please take a few minutes to complete the written survey that you may receive in the mail after your visit with us. Thank you!             Your Updated Medication List - Protect others around you: Learn how to safely use, store and throw away your medicines at www.disposemymeds.org.          This list is accurate as of: 8/25/17  8:16 AM.  Always use your most recent med list.                   Brand Name Dispense Instructions for use Diagnosis    diphenoxylate-atropine 2.5-0.025 MG per tablet    LOMOTIL    40 tablet    Take 1-2 tablets by mouth 4 times daily as needed for diarrhea    Rectal cancer (H)       loperamide 2 MG capsule    IMODIUM    30 capsule    Start with 2 caps (4 mg), then take one cap (2 mg) after each diarrheal stool as needed.    Rectal cancer (H)       LORazepam 0.5 MG tablet    ATIVAN    30 tablet    Take 1 tablet (0.5 mg) by mouth every 4 hours as needed (Anxiety, Nausea/Vomiting or Sleep)    Rectal cancer (H)       ondansetron 8 MG tablet    ZOFRAN    10 tablet    Take 1 tablet (8 mg) by mouth every 8  hours as needed (Nausea/Vomiting)    Rectal cancer (H)       prochlorperazine 10 MG tablet    COMPAZINE    30 tablet    Take 1 tablet (10 mg) by mouth every 6 hours as needed (Nausea/Vomiting)    Rectal cancer (H)       TYLENOL PO      Take 500 mg by mouth every 6 hours as needed Reported on 4/12/2017

## 2017-08-25 NOTE — PROGRESS NOTES
"Infusion Nursing Note:  Fabinene Lynch presents for D1C2 Irinotecan, Fluorouracil pump hook up  Met with LUIS Saleem before infusion.    Note: per Leatha, no Cyramza today as pt may be having surgery in the near future, no need to do 24 urine protein at this time. Collect stool specimen in infusion if pt is able, otherwise send her home with stool collection kit.    Pt unable to provide stool sample while in infusion, pt given supplies and instructions to take home.     Treatment Conditions:  Lab Results   Component Value Date    HGB 11.9 08/25/2017     Lab Results   Component Value Date    WBC 4.0 08/25/2017      Lab Results   Component Value Date    ANEU 2.8 08/25/2017     Lab Results   Component Value Date     08/25/2017      Lab Results   Component Value Date     08/25/2017                   Lab Results   Component Value Date    POTASSIUM 4.0 08/25/2017           No results found for: MAG         Lab Results   Component Value Date    CR 0.62 08/25/2017                   Lab Results   Component Value Date    ANDRZEJ 8.9 08/25/2017                Lab Results   Component Value Date    BILITOTAL 1.1 08/25/2017           Lab Results   Component Value Date    ALBUMIN 3.4 08/25/2017                    Lab Results   Component Value Date    ALT 32 08/25/2017           Lab Results   Component Value Date    AST 29 08/25/2017     Results reviewed, labs MET treatment parameters, ok to proceed with treatment.    Intravenous Access:  Implanted Port.    Post Infusion Assessment:  Patient tolerated infusion without incident.  Blood return noted pre and post infusion.  No evidence of extravasations.    Prior to discharge: Port is secured in place with tegaderm and flushed with 10cc NS with positive blood return noted.  Continuous home infusion Dosi-Fuser pump connected.    All connectors secured in place and clamps taped open.    Pump started, \"running\" noted on display (CADD): Not Applicable.  Patient instructed " to call our clinic or [facility] with any questions or concerns at home.  Patient verbalized understanding.    Patient set up for pump disconnect at home with [facility] on Sunday, 8/27 @8:30am. Rickey with [facility] aware.    Discharge Plan:   Prescription refills given for lomotil.  Discharge instructions reviewed with: Patient and Family.  Patient and/or family verbalized understanding of discharge instructions and all questions answered.  AVS to patient via [email or web address].  Patient will return 9/13 for next appointment.   Patient discharged in stable condition accompanied by: self and .    Chelsea Vora RN

## 2017-08-25 NOTE — MR AVS SNAPSHOT
After Visit Summary   8/25/2017    Fabienne Lynch    MRN: [id number]           Patient Information     Date Of Birth          1970        Visit Information        Provider Department      8/25/2017 8:00 AM UC 18 ATC; [facility] ONCOLOGY INFUSION [facility]        Today's Diagnoses     Rectal cancer (H)    -  1      Care Instructions    Contact numbers:  Triage Main Line: [phone]  After hours: [phone]    Call with chills and/or temperature greater than or equal to 100.5 and questions or concerns.    If after hours, weekends, or holidays, call main hospital  at  [phone] and ask for Oncology doctor on call.           August 2017 Sunday Monday Tuesday Wednesday Thursday Friday Saturday             1     2     [facility] D[facility]    9:45 AM   (15 min.)    [facility] DRAW   [facility] Lab Draw     [facility] ONC INFUSION 180   10:30 AM   (180 min.)    ONCOLOGY INFUSION   [facility] 3     4     5       6     7     8     9     10     11     12       13     14     15     16     17     18     19       20     21     22     23     24     25     [facility]    6:30 AM   (15 min.)    [facility] DRAW   [facility] Lab Draw     [facility] RETURN    6:45 AM   (50 min.)   Azul Mansfield PA-C   [facility]     [facility] ONC INFUSION 180    8:00 AM   (180 min.)    ONCOLOGY INFUSION   [facility] 26       27     28     29     30     31 September 2017 Sunday Monday Tuesday Wednesday Thursday Friday Saturday                            1     2       3     4     5     6     7     8     9       10     11     12     13     [facility] D[facility]    7:15 AM   (15 min.)    [facility] DRAW   [facility] Lab Draw     [facility] RETURN    7:30 AM   (30 min.)   Xochitl Christine MD   [facility]     [facility] ONC INFUSION 180    8:30 AM   (180 min.)    ONCOLOGY INFUSION   [facility] 14     15     16       17     18     19     20     21     22     23       24     25     26     27     28     29     30                  Lab Results:  Recent Results (from the past 12 hour(s))   Protein qualitative urine    Collection Time: 08/25/17  7:01 AM   Result Value Ref Range    Protein Albumin Urine 30 (A) NEG^Negative mg/dL   CBC with platelets differential    Collection Time: 08/25/17  7:02 AM   Result Value Ref Range    WBC 4.0 4.0 - 11.0 10e9/L    RBC Count 3.64 (L) 3.8 - 5.2 10e12/L    Hemoglobin 11.9 11.7 - 15.7 g/dL    Hematocrit 35.2 35.0 - 47.0 %    MCV 97 78 - 100 fl    MCH 32.7 26.5 - 33.0 pg    MCHC 33.8 31.5 - 36.5 g/dL    RDW 14.3 10.0 - 15.0 %    Platelet Count 106 (L) 150 - 450 10e9/L    Diff Method Automated Method     % Neutrophils 69.5 %    % Lymphocytes 13.2 %    % Monocytes 9.9 %    % Eosinophils 6.6 %    % Basophils 0.5 %    % Immature Granulocytes 0.3 %    Nucleated RBCs 0 0 /100    Absolute Neutrophil 2.8 1.6 - 8.3 10e9/L    Absolute Lymphocytes 0.5 (L) 0.8 - 5.3 10e9/L    Absolute Monocytes 0.4 0.0 - 1.3 10e9/L    Absolute Eosinophils 0.3 0.0 - 0.7 10e9/L    Absolute Basophils 0.0 0.0 - 0.2 10e9/L    Abs Immature Granulocytes 0.0 0 - 0.4 10e9/L    Absolute Nucleated RBC 0.0    Bilirubin  total    Collection Time: 08/25/17  7:02 AM   Result Value Ref Range    Bilirubin Total 1.1 0.2 - 1.3 mg/dL   Albumin level    Collection Time: 08/25/17  7:02 AM   Result Value Ref Range    Albumin 3.4 3.4 - 5.0 g/dL   Alkaline phosphatase    Collection Time: 08/25/17  7:02 AM   Result Value Ref Range    Alkaline Phosphatase 107 40 - 150 U/L   ALT    Collection Time: 08/25/17  7:02 AM   Result Value Ref Range    ALT 32 0 - 50 U/L   AST    Collection Time: 08/25/17  7:02 AM   Result Value Ref Range    AST 29 0 - 45 U/L   Basic metabolic panel    Collection Time: 08/25/17  7:02 AM   Result Value Ref Range    Sodium 142 133 - 144 mmol/L    Potassium 4.0 3.4 - 5.3 mmol/L    Chloride  108 94 - 109 mmol/L    Carbon Dioxide 23 20 - 32 mmol/L    Anion Gap 11 3 - 14 mmol/L    Glucose 90 70 - 99 mg/dL    Urea Nitrogen 12 7 - 30 mg/dL    Creatinine 0.62 0.52 - 1.04 mg/dL    GFR Estimate >90 >60 mL/min/1.7m2    GFR Estimate If Black >90 >60 mL/min/1.7m2    Calcium 8.9 8.5 - 10.1 mg/dL   Protein total    Collection Time: 08/25/17  7:02 AM   Result Value Ref Range    Protein Total 6.9 6.8 - 8.8 g/dL               Follow-ups after your visit        Your next 10 appointments already scheduled     Sep 13, 2017  7:15 AM CDT   [facility] Draw with [facility] LAB DRAW   [facility] Lab Draw ([facility])    [address] [place] [id number] [phone]            Sep 13, 2017  7:45 AM CDT   (Arrive by 7:30 AM)   Return Visit with Xochitl Christine MD   [facility] ([facility])    [address] [place] [address] [id number] [phone]            Sep 13, 2017  8:30 AM CDT   Infusion 180 with  ONCOLOGY INFUSION, UC 16 ATC   [facility] ([facility])    [address] [place] [id number] [phone]              Future tests that were ordered for you today     Open Future Orders        Priority Expected Expires Ordered    Clostridium difficile toxin B PCR Routine 8/25/2017 8/25/2018 8/25/2017            Who to contact     If you have questions or need follow up information about today's clinic visit or your schedule please contact [facility] directly at [phone].  Normal or non-critical lab and imaging results will be communicated to you by MyChart, letter or phone within 4 business days after the clinic has received the results. If you do not hear from us within 7 days, please contact the clinic through MyChart or phone. If you have a critical or abnormal lab result, we will notify you by phone as soon as  possible.  Submit refill requests through [email or web address] or call your pharmacy and they will forward the refill request to us. Please allow 3 business days for your refill to be completed.          Additional Information About Your Visit        V[employer] Information     [email or web address] gives you secure access to your electronic health record. If you see a primary care provider, you can also send messages to your care team and make appointments. If you have questions, please call your primary care clinic.  If you do not have a primary care provider, please call [phone] and they will assist you.        Care EveryWhere ID     This is your Care EveryWhere ID. This could be used by other organizations to access your [place] medical records  [id number]         Blood Pressure from Last 3 Encounters:   08/25/17 113/81   08/02/17 108/77   07/26/17 127/88    Weight from Last 3 Encounters:   08/25/17 57.9 kg (127 lb 9.6 oz)   08/02/17 57.8 kg (127 lb 6.4 oz)   07/26/17 58.3 kg (128 lb 9.6 oz)              We Performed the Following     Albumin level     Alkaline phosphatase     ALT     AST     Basic metabolic panel     Bilirubin  total     CBC with platelets differential     CEA     Protein qualitative urine     Protein total          Today's Medication Changes          These changes are accurate as of: 8/25/17 11:02 AM.  If you have any questions, ask your nurse or doctor.               Start taking these medicines.        Dose/Directions    diphenoxylate-atropine 2.5-0.025 MG per tablet   Commonly known as:  LOMOTIL   Used for:  Rectal cancer (H)   Started by:  Azul Mansfield PA-C        Dose:  1-2 tablet   Take 1-2 tablets by mouth 4 times daily as needed for diarrhea   Quantity:  40 tablet   Refills:  0            Where to get your medicines      Some of these will need a paper prescription and others can be bought over the counter.  Ask your nurse if you have questions.     Bring a paper prescription for each of these  medications     diphenoxylate-atropine 2.5-0.025 MG per tablet                Primary Care Provider Office Phone # Fax #    Golden Quinteros [phone]       [facility] [address]  [facility] [address]        Equal Access to Services     FANNY SCOTT : Hadii aad ku hadyonathano Soomaali, waaxda luqadaha, qaybta kaalmada adeegyada, waxmatt isadorain hayaan adezoey fan lamiladynicole constantino. So [facility] [phone].    ATENCIÓN: Si habla español, tiene a sewell disposición servicios gratuitos de asistencia lingüística. Llame al [phone].    We comply with applicable federal civil rights laws and [place] laws. We do not discriminate on the basis of race, color, national origin, age, disability sex, sexual orientation or gender identity.            Thank you!     Thank you for choosing [facility]  for your care. Our goal is always to provide you with excellent care. Hearing back from our patients is one way we can continue to improve our services. Please take a few minutes to complete the written survey that you may receive in the mail after your visit with us. Thank you!             Your Updated Medication List - Protect others around you: Learn how to safely use, store and throw away your medicines at www.disposemymeds.org.          This list is accurate as of: 8/25/17 11:02 AM.  Always use your most recent med list.                   Brand Name Dispense Instructions for use Diagnosis    diphenoxylate-atropine 2.5-0.025 MG per tablet    LOMOTIL    40 tablet    Take 1-2 tablets by mouth 4 times daily as needed for diarrhea    Rectal cancer (H)       loperamide 2 MG capsule    IMODIUM    30 capsule    Start with 2 caps (4 mg), then take one cap (2 mg) after each diarrheal stool as needed.    Rectal cancer (H)       LORazepam 0.5 MG tablet    ATIVAN    30 tablet    Take 1 tablet (0.5 mg) by mouth every 4 hours as needed (Anxiety, Nausea/Vomiting or Sleep)    Rectal cancer (H)       ondansetron 8 MG  tablet    ZOFRAN    10 tablet    Take 1 tablet (8 mg) by mouth every 8 hours as needed (Nausea/Vomiting)    Rectal cancer (H)       prochlorperazine 10 MG tablet    COMPAZINE    30 tablet    Take 1 tablet (10 mg) by mouth every 6 hours as needed (Nausea/Vomiting)    Rectal cancer (H)       TYLENOL PO      Take 500 mg by mouth every 6 hours as needed Reported on 4/12/2017

## 2017-08-25 NOTE — LETTER
8/25/2017       RE: Fabienne Lynch  [address] [place] [address]     Dear Colleague,    Thank you for referring your patient, Fabienne Lynch, to the [facility]. Please see a copy of my visit note below.    HEMATOLOGY/ONCOLOGY PROGRESS NOTE  Aug 25, 2017    REASON FOR VISIT: follow-up of rectal carcinoma with mets to liver and lung- KRAS mutant    DIAGNOSIS:   Fabienne Lynch is a 47 year old female with rectal carcinoma with mets to liver. Please refer to Dr. Christine's notes for full history. Summarized briefly here, Fabienne was diagnosed with an obstructing rectal mass.  She was initiated on FOLFOX and then bevacizumab was added later. She had a very good response to the metastatic disease in her liver and also at the primary site. She was switched to 5-FU and Avastin maintenance, but unfortunately at the time of followup scan she was found to have progressive disease at the site of the original tumor. She underwent chemoradiation for the rectal lesion and then took a break because of multiple family events and obligations. At the time of initiation of the chemotherapy with 5-FU, irinotecan,(FOLFIRI) and bevacizumab she had mildly progressive disease.   In July of 2017 she had mild progression and was recently switched to 5FU + irinotecan + Cyramza.      Fabienne comes in prior to C2.     INTERVAL HISTORY: Fabienne is here with her  today.  She feels this last cycle was much harder than the previous. She was expecting Cyramza to not be much different than her prior Avastin, but she had much worse diarrhea.  Basically for the last three weeks she has diarrhea almost everytime she eats, about an hour later.  She has been unable to exercise and has felt a little home-bound.  She had an extra week off as she had a work up down at [place] and even this last week she continued to have diarrhea.     She also has had a little harder time sleeping, she got a lot of good news down at [place] and its made  "her head spin.      Otherwise, ROS negative for: fever, chills, sweats, signs of infection, rashes, change in vision or hearing, mucositis, shortness of breath, cough, chest pain, abdominal pain, vomiting, diarrhea, weakness, easy bleeding or bruising, lymphadenopathy, or bone pain.    Current Outpatient Prescriptions   Medication Sig Dispense Refill     LORazepam (ATIVAN) 0.5 MG tablet Take 1 tablet (0.5 mg) by mouth every 4 hours as needed (Anxiety, Nausea/Vomiting or Sleep) 30 tablet 2     prochlorperazine (COMPAZINE) 10 MG tablet Take 1 tablet (10 mg) by mouth every 6 hours as needed (Nausea/Vomiting) 30 tablet 2     loperamide (IMODIUM) 2 MG capsule Start with 2 caps (4 mg), then take one cap (2 mg) after each diarrheal stool as needed. 30 capsule 0     ondansetron (ZOFRAN) 8 MG tablet Take 1 tablet (8 mg) by mouth every 8 hours as needed (Nausea/Vomiting) 10 tablet 2     Acetaminophen (TYLENOL PO) Take 500 mg by mouth every 6 hours as needed Reported on 4/12/2017          Allergies   Allergen Reactions     Nitrous Oxide Nausea and Vomiting     PHYSICAL EXAMINATION  /81  Pulse 83  Temp 98.3  F (36.8  C) (Oral)  Resp 16  Ht 1.632 m (5' 4.25\")  Wt 57.9 kg (127 lb 9.6 oz)  SpO2 98%  BMI 21.73 kg/m2  Constitutional: Alert, oriented female in no visible distress.  Eyes: PERRL. Anicteric sclerae.  ENT/Mouth: OM moist and pink without lesions or thrush.  Abdomen: Soft, non-tender, non-distended. Bowel sounds present. No masses appreciated. No organomegaly noted.  Extremities: No lower extremity edema appreciated.  Skin: Warm, dry. No bruising or petechiae noted. Small areas of discoloration on hands but no cracking.   Neuro: CN II-XII grossly intact.    LABS:     8/2/2017 09:59   Bilirubin Total 1.0   WBC 3.0 (L)   Hemoglobin 12.1   Hematocrit 36.2   Platelet Count 113 (L)   RBC Count 3.73 (L)   MCV 97        IMPRESSION/PLAN:  Fabienne Lynch is a 47 year old female with rectal carcinoma with mets to " liver. Currently being treated with 5FU + irinotecan + Cyramza.     Metastatic rectal cancer with metastasis to lung and liver- K-alfredo mutated and normal MMR expression.  Treatment history  Initiated treated with XELOX as there was an ongoing consideration for surgical intervention, then she was switched to Xeloda and bevacizumab for maintenance after a very good response. She showed progressive disease at the site of original tumor and was switched to chemoradiation with Xeloda for chemotherapy.  She most recently underwent 5 cycles of FOLFIRI and bevacizumab with expected side effects .  Her recent CT scan on 7/25/17 showed progressive disease.  She met with Dr Christine and was switched to 5-FU and irinotecan with Cyramza.     Fabienne had significantly worse diarrhea this cycle- for the last 3 weeks everytime she eats, she has a loose stool.  This makes me worried about c diff and we'll get a culture today.  She is really not interested in the Cyramza today- she would just like the 5FU and irinotecan.  I had a long discussion with her that likely its the chemo causing the diarrhea, not the Cyramza, but she would prefer to hold it today.     We will get stool cultures and check a CMP and I gave her lomotil.   I will obtain her notes from [place]- had a PET and a scope showing essentially no disease except her lungs- they are presenting her at tumor board and considering surgery.     Leatha Mansfield PA-C

## 2017-08-27 ENCOUNTER — HOSPITAL ENCOUNTER (OUTPATIENT)
Facility: CLINIC | Age: 47
Setting detail: SPECIMEN
Discharge: HOME | End: 2017-08-27
Admitting: PHYSICIAN ASSISTANT
Payer: COMMERCIAL

## 2017-08-27 LAB
C DIFF TOX B STL QL: NEGATIVE
SPECIMEN SOURCE: NORMAL

## 2017-08-27 PROCEDURE — 87493 C DIFF AMPLIFIED PROBE: CPT | Performed by: PHYSICIAN ASSISTANT

## 2017-08-28 NOTE — PROGRESS NOTES
This is a recent snapshot of the patient's [facility] medical record.  For current drug dose and complete information and questions, call [phone] or In [facility] pool, fv home infusion ([id number])  CSN Number:  [id number]

## 2017-08-29 ENCOUNTER — MYC MEDICAL ADVICE (OUTPATIENT)
Dept: ONCOLOGY | Facility: CLINIC | Age: 47
End: 2017-08-29

## 2017-09-13 ENCOUNTER — CARE COORDINATION (OUTPATIENT)
Dept: ONCOLOGY | Facility: CLINIC | Age: 47
End: 2017-09-13

## 2017-09-13 ENCOUNTER — TRANSFERRED RECORDS (OUTPATIENT)
Dept: HEALTH INFORMATION MANAGEMENT | Facility: CLINIC | Age: 47
End: 2017-09-13

## 2017-09-13 NOTE — PROGRESS NOTES
Fabienne canceled her appt with Dr. Christine and infusion today.  She has been getting a 2nd opinion at [place].  Left message for Fabienne asking if she wanted to reschedule at [facility] or to let us know if she is transferring care.

## 2017-09-14 NOTE — PROGRESS NOTES
Fabienne returned call, she has met with a Colorectal Surgeon at [facility] who is offering her surgery.  She would like to talk to Dr. Christine an Dr. Felipe to hear their recommendations.     [facility] requested scan and latest appt notes from [facility].

## 2017-09-15 ENCOUNTER — CARE COORDINATION (OUTPATIENT)
Dept: SURGERY | Facility: CLINIC | Age: 47
End: 2017-09-15

## 2017-09-15 NOTE — PROGRESS NOTES
Rec'd msg from Onc RNCC that pt would like to discuss [place]'s recommendations with Dr. Felipe.  Spoke to pt--let her know Dr. Felipe is out of the office next week, but could see pt in early October.  She states this may be too long to wait because she may need to make decisions next week.   Gave her scheduling # to call in the event things get delayed & may have time to come in.

## 2017-09-18 ASSESSMENT — ENCOUNTER SYMPTOMS
NAUSEA: 0
SKIN CHANGES: 0
BLOOD IN STOOL: 1
JAUNDICE: 0
RECTAL BLEEDING: 1
POOR WOUND HEALING: 0
DIARRHEA: 1
HEARTBURN: 1
RECTAL PAIN: 0
ABDOMINAL PAIN: 0
NAIL CHANGES: 0
BLOATING: 0
CONSTIPATION: 0
VOMITING: 0
BOWEL INCONTINENCE: 1

## 2017-09-20 ENCOUNTER — ONCOLOGY VISIT (OUTPATIENT)
Dept: ONCOLOGY | Facility: CLINIC | Age: 47
End: 2017-09-20
Attending: INTERNAL MEDICINE
Payer: COMMERCIAL

## 2017-09-20 VITALS
OXYGEN SATURATION: 99 % | SYSTOLIC BLOOD PRESSURE: 121 MMHG | TEMPERATURE: 98 F | DIASTOLIC BLOOD PRESSURE: 82 MMHG | BODY MASS INDEX: 21.31 KG/M2 | HEART RATE: 76 BPM | RESPIRATION RATE: 16 BRPM | WEIGHT: 125.1 LBS

## 2017-09-20 DIAGNOSIS — C20 RECTAL CANCER (H): Primary | ICD-10-CM

## 2017-09-20 PROCEDURE — 99212 OFFICE O/P EST SF 10 MIN: CPT | Mod: ZF

## 2017-09-20 PROCEDURE — 99214 OFFICE O/P EST MOD 30 MIN: CPT | Mod: ZP | Performed by: INTERNAL MEDICINE

## 2017-09-20 ASSESSMENT — PAIN SCALES - GENERAL: PAINLEVEL: NO PAIN (0)

## 2017-09-20 NOTE — MR AVS SNAPSHOT
After Visit Summary   9/20/2017    Fabienne Lynch    MRN: [id number]           Patient Information     Date Of Birth          1970        Visit Information        Provider Department      9/20/2017 8:15 AM Xochitl Christine MD [facility]        Today's Diagnoses     Rectal cancer (H)    -  1       Follow-ups after your visit        Who to contact     If you have questions or need follow up information about today's clinic visit or your schedule please contact [facility] directly at [phone].  Normal or non-critical lab and imaging results will be communicated to you by Getourguidehart, letter or phone within 4 business days after the clinic has received the results. If you do not hear from us within 7 days, please contact the clinic through ThinkLinkt or phone. If you have a critical or abnormal lab result, we will notify you by phone as soon as possible.  Submit refill requests through [employer] or call your pharmacy and they will forward the refill request to us. Please allow 3 business days for your refill to be completed.          Additional Information About Your Visit        MyChart Information     [employer] gives you secure access to your electronic health record. If you see a primary care provider, you can also send messages to your care team and make appointments. If you have questions, please call your primary care clinic.  If you do not have a primary care provider, please call [phone] and they will assist you.        Care EveryWhere ID     This is your Care EveryWhere ID. This could be used by other organizations to access your [place] medical records  [id number]        Your Vitals Were     Pulse Temperature Respirations Pulse Oximetry BMI (Body Mass Index)       76 98  F (36.7  C) (Oral) 16 99% 21.31 kg/m2        Blood Pressure from Last 3 Encounters:   09/20/17 121/82   08/25/17 113/81   08/02/17 108/77    Weight from Last 3 Encounters:   09/20/17  56.7 kg (125 lb 1.6 oz)   08/25/17 57.9 kg (127 lb 9.6 oz)   08/02/17 57.8 kg (127 lb 6.4 oz)              Today, you had the following     No orders found for display       Primary Care Provider Office Phone # Fax #    Golden Quinteros [phone]       [facility] [address]  [facility] [address]        Equal Access to Services     FANNY SCOTT : Hadii aad ku hadasho Soomaali, waaxda luqadaha, qaybta kaalmada adeegyada, waxay idiin hayaan adeeg kharash lasheri . So [facility] [phone].    ATENCIÓN: Si suzannala espfadumo, tiene a sewell disposición servicios gratuitos de asistencia lingüística. Llame al [phone].    We comply with applicable federal civil rights laws and [place] laws. We do not discriminate on the basis of race, color, national origin, age, disability sex, sexual orientation or gender identity.            Thank you!     Thank you for choosing [facility]  for your care. Our goal is always to provide you with excellent care. Hearing back from our patients is one way we can continue to improve our services. Please take a few minutes to complete the written survey that you may receive in the mail after your visit with us. Thank you!             Your Updated Medication List - Protect others around you: Learn how to safely use, store and throw away your medicines at www.disposemymeds.org.          This list is accurate as of: 9/20/17 11:59 PM.  Always use your most recent med list.                   Brand Name Dispense Instructions for use Diagnosis    diphenoxylate-atropine 2.5-0.025 MG per tablet    LOMOTIL    40 tablet    Take 1-2 tablets by mouth 4 times daily as needed for diarrhea    Rectal cancer (H)       loperamide 2 MG capsule    IMODIUM    30 capsule    Start with 2 caps (4 mg), then take one cap (2 mg) after each diarrheal stool as needed.    Rectal cancer (H)       LORazepam 0.5 MG tablet    ATIVAN    30 tablet    Take 1 tablet (0.5 mg) by mouth every 4  hours as needed (Anxiety, Nausea/Vomiting or Sleep)    Rectal cancer (H)       ondansetron 8 MG tablet    ZOFRAN    10 tablet    Take 1 tablet (8 mg) by mouth every 8 hours as needed (Nausea/Vomiting)    Rectal cancer (H)       prochlorperazine 10 MG tablet    COMPAZINE    30 tablet    Take 1 tablet (10 mg) by mouth every 6 hours as needed (Nausea/Vomiting)    Rectal cancer (H)       TYLENOL PO      Take 500 mg by mouth every 6 hours as needed Reported on 4/12/2017

## 2017-09-20 NOTE — LETTER
9/20/2017       RE: Fabienne Lynch  [address] [facility] [address]     Dear Colleague,    Thank you for referring your patient, Fabienne Lynch, to the [facility]. Please see a copy of my visit note below.    REASON FOR VISIT:  This is a followup visit for this patient with metastatic rectal cancer.  She is here to discuss the [place] recommendations that she has received and the care plan going forward.      INTERVAL HISTORY:  Fabienne reports that she is continuing to feel better and better as she gets further out from chemotherapy.  It has been about a month since she took her last dose of chemo.  In the interim, she had an opportunity to go and meet with physicians at [facility].  She met with Dr. Rose from Gastrointestinal Oncology and she also had the opportunity to meet with a surgeon to review her case and discuss possible surgical interventions.  Fabienne also underwent a PET/CT scan that was remarkable for some PET avidity around the site of her original tumor in the rectum, but also PET avidity with low SUV in her lungs.  She had documented metastatic liver disease, and at this point the lesions in the liver are not lighting up and are likely cysts.  The [facility] discussion has been to shoot for a question of radiation, as the surgeon who did endoscopic evaluation for Fabienne did not feel like there was anything active but just fibrotic dead tissue at the site of primary, but there is still some PET avidity around the area, and this may be something that can be radiated beforehand before she goes to surgery.  The plan would be a two-step surgery where she would undergo APR with a colostomy placement and then a second surgery where she would undergo lung metastasectomy.  Fabienne she is at peace with the fact that she may end up with a colostomy and would rather prefer to go that route because it is becoming a quality of life issue for her given the fact that she has had several  accidents since chemoradiation.  Other than that, she  denies any fevers, chills, coughing, breathing trouble or abdominal pain.  Bowel movements are more regular.  She still has occasional episodes of accidents.  She has no palmar or plantar dysesthesia at this point.       PHYSICAL EXAMINATION:   VITAL SIGNS:  /82 (BP Location: Left arm, Patient Position: Sitting, Cuff Size: Adult Small)  Pulse 76  Temp 98  F (36.7  C) (Oral)  Resp 16  Wt 56.7 kg (125 lb 1.6 oz)  SpO2 99%  BMI 21.31 kg/m2     GENERAL:  Alert and oriented x3, in no apparent distress.  The patient has very thin hair secondary to chemotherapy.    HEENT:  Moist mucous membranes.  No ulcers.    SKIN:  No cracks.  She seems to be recovering from the palmar and plantar dysesthesia from Xeloda.   ABDOMEN:  Nontender to palpation.      RESULTS:  Laboratory and imaging from [facility] were reviewed, and I also reviewed extensive chart from [facility] both from the surgeon and medical oncologist.  I also had a very lengthy discussion with Dr. Felipe, our colorectal surgeon who had seen Fabienne in the past, and spent upwards of 30 minutes discussing her case and how to proceed and give Fabienne recommendations.       ASSESSMENT AND PLAN:  This is a patient with metastatic rectal cancer with metastases to liver and lung.  I discussed with Fabienne that from my standpoint, she has metastatic disease that has been under check and actually responded very well initially with the chemotherapy.  At this point, we still know that the lung lesions are PET-avid, making a case for ongoing metastatic foci.  I personally think that the surgery that is being offered to her is rather aggressive and the chance of cure is very, very slim.  In my opinion, if she were to pursue the rectal surgery, it would be more for a palliative intent and quality of life issues rather than actually cure.  I am unclear of the significance of the radiation treatment for her and I do not  have any notes detailing what exactly is the intent surrounding this.  Regarding the liver lesions, I agree that they are not PET-avid at this point and likely more cystic in nature, but again, she has not had any excisional interventions, so I am unsure if it is actually a dormant site.  Lastly, regarding the lung lesions, there are more than 3 in the different lobes, so any sort of surgery for metastasectomy is going to be rather aggressive.  I would leave it to the surgeon's discretion.  Also, I would have Dr. Felipe call Fabienne regarding our discussion to discuss our opinion about the surgery.       At the end of our 45-minute visit, Fabienne felt like she got all her questions answered and at this point she will think things through.  She has an appointment at [facility].  She would give us a call once she has a consolidated plan whether or not she is going for surgery.  If she needs to resume chemo, I am more than happy to see her.  Otherwise, after surgery I am thinking she should be off chemo for at least 6-8 weeks even if the surgery happens in short order.     Xochitl Christine   of Medicine   Hematology and medical Oncology   [facility]

## 2017-09-20 NOTE — NURSING NOTE
"Oncology Rooming Note    September 20, 2017 8:12 AM   Fabienne Lynch is a 47 year old female who presents for:    Chief Complaint   Patient presents with     Oncology Clinic Visit     Patient is here for surgery discussion      Initial Vitals: /82 (BP Location: Left arm, Patient Position: Sitting, Cuff Size: Adult Small)  Pulse 76  Temp 98  F (36.7  C) (Oral)  Resp 16  Wt 56.7 kg (125 lb 1.6 oz)  SpO2 99%  BMI 21.31 kg/m2 Estimated body mass index is 21.31 kg/(m^2) as calculated from the following:    Height as of 8/25/17: 1.632 m (5' 4.25\").    Weight as of this encounter: 56.7 kg (125 lb 1.6 oz). Body surface area is 1.6 meters squared.  No Pain (0) Comment: Data Unavailable   No LMP recorded. Patient has had a hysterectomy.  Allergies reviewed: Yes  Medications reviewed: Yes    Medications: Medication refills not needed today.  Pharmacy name entered into Screen:    [facility]  LUIS HUBER - [facility] CHANA ASHBY  [facility] [address] [place], [place] - [address] [place] [address]  [facility] MAIL ORDER ONLY - [facility] - [place], [place] - [phone]    Clinical concerns: Patient is not in any pain at this moment.     6 minutes for nursing intake (face to face time)     Radha Calix LPN            "

## 2017-09-21 ENCOUNTER — TELEPHONE (OUTPATIENT)
Dept: SURGERY | Facility: CLINIC | Age: 47
End: 2017-09-21

## 2017-09-22 ENCOUNTER — TELEPHONE (OUTPATIENT)
Dept: SURGERY | Facility: CLINIC | Age: 47
End: 2017-09-22

## 2017-09-22 NOTE — TELEPHONE ENCOUNTER
----- Message from Lindsey Felipe MD sent at 9/21/2017  6:39 PM CDT -----  I was asked to speak to this patient and her current cancer management plans.  I called just now in the evening and left an extended voicemail indicating that if she would like to meet in the office, I could see her between 1:30 pm and 3:30 pm.   Otherwiseif she would like to speak on the phone please obtain times that are good for her.    zuhair kearns

## 2017-09-22 NOTE — TELEPHONE ENCOUNTER
Spoke with Fabienne this evening at her request to review her cancer management plan.  I had previously been informed by Dr. Christine about the patient's opinion at the [place] and plan there to have an APR and then lung resections of metastatic disease. Her metastases in the liver are not going to be addressed at this time as the information from the PET indicates they are quiescent - patient understands these lesions can re-grow in the future.  She had 2 reasons to speak with me- one that she wanted to her what I thought and two she had questions about recovery after an APR.  From her description the procedure was going to be performed open and without flap reconstruction of the perineum. She had no plans to meet with [facility] until perhaps the day of surgery, but she has done a lot of on line reading on the stoma.  In addition, she doesn't have a surgery date yet so pre-surgical planning may not be complete.  She states that she is not losing weight. She has been off chemo since early sept.  She is eating well. She a lot of diarrhea on chemo but has not had any rectal pain since her 2nd cycle with oxaliplatin.  She is having bowel accidents and some bowel frequency. She isworking.  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    Regarding the indication for APR, one can argue that this is for QOL given bowel problems, however, clearly she is interested in cancer control.  I don't think it I unreasonable, however, she shouldn't expect this to prolong her life as this is a poorly studied area and she has two sites of mets.  The up-side is that she is young and healthy and will prob tolerate surgery well.  The downside is that if she has complications, wound complications in particular, that this could delay or limit chemo options should they become necessary.  She is resigned to the permanent stoma so this is not a down side from her  perspective.  We discussed that my top concern after an APR is perineal complications. I described the range of morbidity from this. She asked about urinary retention and I went into more detail about temporary vs permanent problems, with permanent self cath being quite rare in a women.  We discussed post op pain.  I did offer for her to meet with our [facility]OC[facility] for further education if she was interested.  She did seem interested in this.  Patient is welcome to call or come to meet if she has other questions.  I let her know I would be out of town in early October, however.

## 2017-09-22 NOTE — TELEPHONE ENCOUNTER
Per task, I called Fabienne to see if she would like to come in today to see Dr. Felipe. She let me know that she called Dr. Felipe back last night and they were able to talk. She appreciated Dr. Felipe's call and does not require a clinic appointment at this time.

## 2017-09-26 NOTE — PROGRESS NOTES
REASON FOR VISIT:  This is a followup visit for this patient with metastatic rectal cancer.  She is here to discuss the [place] recommendations that she has received and the care plan going forward.      INTERVAL HISTORY:  Fabienne reports that she is continuing to feel better and better as she gets further out from chemotherapy.  It has been about a month since she took her last dose of chemo.  In the interim, she had an opportunity to go and meet with physicians at [facility].  She met with Dr. Rose from Gastrointestinal Oncology and she also had the opportunity to meet with a surgeon to review her case and discuss possible surgical interventions.  Fabienne also underwent a PET/CT scan that was remarkable for some PET avidity around the site of her original tumor in the rectum, but also PET avidity with low SUV in her lungs.  She had documented metastatic liver disease, and at this point the lesions in the liver are not lighting up and are likely cysts.  The [facility] discussion has been to shoot for a question of radiation, as the surgeon who did endoscopic evaluation for Fabienne did not feel like there was anything active but just fibrotic dead tissue at the site of primary, but there is still some PET avidity around the area, and this may be something that can be radiated beforehand before she goes to surgery.  The plan would be a two-step surgery where she would undergo APR with a colostomy placement and then a second surgery where she would undergo lung metastasectomy.  Fabienne she is at peace with the fact that she may end up with a colostomy and would rather prefer to go that route because it is becoming a quality of life issue for her given the fact that she has had several accidents since chemoradiation.  Other than that, she  denies any fevers, chills, coughing, breathing trouble or abdominal pain.  Bowel movements are more regular.  She still has occasional episodes of accidents.  She has no palmar or plantar  dysesthesia at this point.       PHYSICAL EXAMINATION:   VITAL SIGNS:  /82 (BP Location: Left arm, Patient Position: Sitting, Cuff Size: Adult Small)  Pulse 76  Temp 98  F (36.7  C) (Oral)  Resp 16  Wt 56.7 kg (125 lb 1.6 oz)  SpO2 99%  BMI 21.31 kg/m2     GENERAL:  Alert and oriented x3, in no apparent distress.  The patient has very thin hair secondary to chemotherapy.    HEENT:  Moist mucous membranes.  No ulcers.    SKIN:  No cracks.  She seems to be recovering from the palmar and plantar dysesthesia from Xeloda.   ABDOMEN:  Nontender to palpation.      RESULTS:  Laboratory and imaging from [facility] were reviewed, and I also reviewed extensive chart from [facility] both from the surgeon and medical oncologist.  I also had a very lengthy discussion with Dr. Felipe, our colorectal surgeon who had seen Fabienne in the past, and spent upwards of 30 minutes discussing her case and how to proceed and give Fabienne recommendations.       ASSESSMENT AND PLAN:  This is a patient with metastatic rectal cancer with metastases to liver and lung.  I discussed with Fabienne that from my standpoint, she has metastatic disease that has been under check and actually responded very well initially with the chemotherapy.  At this point, we still know that the lung lesions are PET-avid, making a case for ongoing metastatic foci.  I personally think that the surgery that is being offered to her is rather aggressive and the chance of cure is very, very slim.  In my opinion, if she were to pursue the rectal surgery, it would be more for a palliative intent and quality of life issues rather than actually cure.  I am unclear of the significance of the radiation treatment for her and I do not have any notes detailing what exactly is the intent surrounding this.  Regarding the liver lesions, I agree that they are not PET-avid at this point and likely more cystic in nature, but again, she has not had any excisional interventions, so I  am unsure if it is actually a dormant site.  Lastly, regarding the lung lesions, there are more than 3 in the different lobes, so any sort of surgery for metastasectomy is going to be rather aggressive.  I would leave it to the surgeon's discretion.  Also, I would have Dr. Felipe call Fabienne regarding our discussion to discuss our opinion about the surgery.       At the end of our 45-minute visit, Fabienne felt like she got all her questions answered and at this point she will think things through.  She has an appointment at [facility].  She would give us a call once she has a consolidated plan whether or not she is going for surgery.  If she needs to resume chemo, I am more than happy to see her.  Otherwise, after surgery I am thinking she should be off chemo for at least 6-8 weeks even if the surgery happens in short order.     Xochitl Christine   of Medicine   Hematology and medical Oncology   [facility]          Answers for HPI/ROS submitted by the patient on 9/18/2017   General Symptoms: No  Skin Symptoms: Yes  HENT Symptoms: No  EYE SYMPTOMS: No  HEART SYMPTOMS: No  LUNG SYMPTOMS: No  INTESTINAL SYMPTOMS: Yes  URINARY SYMPTOMS: No  GYNECOLOGIC SYMPTOMS: No  BREAST SYMPTOMS: No  SKELETAL SYMPTOMS: No  BLOOD SYMPTOMS: No  NERVOUS SYSTEM SYMPTOMS: No  MENTAL HEALTH SYMPTOMS: No  Changes in hair: No  Changes in moles/birth marks: No  Itching: No  Rashes: Yes  Changes in nails: No  Acne: Yes  Hair in places you don't want it: No  Change in facial hair: No  Warts: No  Non-healing sores: No  Scarring: No  Flaking of skin: No  Color changes of hands/feet in cold : No  Sun sensitivity: No  Skin thickening: No  Heart burn or indigestion: Yes  Nausea: No  Vomiting: No  Abdominal pain: No  Bloating: No  Constipation: No  Diarrhea: Yes  Blood in stool: Yes  Black stools: No  Rectal or Anal pain: No  Fecal incontinence: Yes  Rectal bleeding: Yes  Yellowing of skin or eyes: No  Vomit with blood:  No  Change in stools: Yes  Hemorrhoids: No

## 2017-09-28 PROCEDURE — 40000929 ZZHCL STATISTIC FOUNDATION ONE GENE PANEL: Performed by: INTERNAL MEDICINE

## 2017-10-19 LAB
LAB SCANNED RESULT: NORMAL
LAB SCANNED RESULT: NORMAL

## 2017-12-31 ENCOUNTER — HEALTH MAINTENANCE LETTER (OUTPATIENT)
Age: 47
End: 2017-12-31

## 2020-03-10 ENCOUNTER — HEALTH MAINTENANCE LETTER (OUTPATIENT)
Age: 50
End: 2020-03-10

## 2020-12-27 ENCOUNTER — HEALTH MAINTENANCE LETTER (OUTPATIENT)
Age: 50
End: 2020-12-27

## 2021-04-24 ENCOUNTER — HEALTH MAINTENANCE LETTER (OUTPATIENT)
Age: 51
End: 2021-04-24

## 2021-04-30 NOTE — NURSING NOTE
Chief Complaint   Patient presents with     Port Draw     Labs collected via port by RN.     Port accessed, labs collected, flushed with saline and heparin, checked in for provider visit.   Landy Verma RN    From: Neyda Waldron  To: Dannie Figueroa  Sent: 4/30/2021 2:48 PM CDT  Subject: Medication Question    Hi Dr. Figueroa,    I need a refill on my Naproxen 500mg tablets please. The pharmacy wasn't able to fill because there aren't refills left.  Let me know if there are questions, thanks!    Neyda

## 2021-10-04 ENCOUNTER — HEALTH MAINTENANCE LETTER (OUTPATIENT)
Age: 51
End: 2021-10-04

## 2022-01-23 ENCOUNTER — HEALTH MAINTENANCE LETTER (OUTPATIENT)
Age: 52
End: 2022-01-23

## 2022-05-15 ENCOUNTER — HEALTH MAINTENANCE LETTER (OUTPATIENT)
Age: 52
End: 2022-05-15

## 2022-09-11 ENCOUNTER — HEALTH MAINTENANCE LETTER (OUTPATIENT)
Age: 52
End: 2022-09-11

## 2023-04-30 ENCOUNTER — HEALTH MAINTENANCE LETTER (OUTPATIENT)
Age: 53
End: 2023-04-30

## 2023-06-03 ENCOUNTER — HEALTH MAINTENANCE LETTER (OUTPATIENT)
Age: 53
End: 2023-06-03

## 2024-11-26 NOTE — PROGRESS NOTES
This is a recent snapshot of the patient's [facility] medical record.  For current drug dose and complete information and questions, call [phone] or In Basket pool, [facility] ([id number])  CSN Number:  [id number]     minimal assistance